# Patient Record
Sex: FEMALE | Race: WHITE | HISPANIC OR LATINO | Employment: FULL TIME | URBAN - METROPOLITAN AREA
[De-identification: names, ages, dates, MRNs, and addresses within clinical notes are randomized per-mention and may not be internally consistent; named-entity substitution may affect disease eponyms.]

---

## 2020-08-04 ENCOUNTER — OFFICE VISIT (OUTPATIENT)
Dept: FAMILY MEDICINE CLINIC | Facility: CLINIC | Age: 45
End: 2020-08-04

## 2020-08-04 VITALS
BODY MASS INDEX: 28.27 KG/M2 | OXYGEN SATURATION: 98 % | HEIGHT: 60 IN | DIASTOLIC BLOOD PRESSURE: 62 MMHG | WEIGHT: 144 LBS | SYSTOLIC BLOOD PRESSURE: 112 MMHG | RESPIRATION RATE: 14 BRPM | TEMPERATURE: 97.3 F | HEART RATE: 86 BPM

## 2020-08-04 DIAGNOSIS — E11.65 TYPE 2 DIABETES MELLITUS WITH HYPERGLYCEMIA, WITH LONG-TERM CURRENT USE OF INSULIN (HCC): ICD-10-CM

## 2020-08-04 DIAGNOSIS — Z12.4 PAP SMEAR FOR CERVICAL CANCER SCREENING: ICD-10-CM

## 2020-08-04 DIAGNOSIS — Z11.59 ENCOUNTER FOR HEPATITIS C SCREENING TEST FOR LOW RISK PATIENT: ICD-10-CM

## 2020-08-04 DIAGNOSIS — Z79.4 TYPE 2 DIABETES MELLITUS WITH HYPERGLYCEMIA, WITH LONG-TERM CURRENT USE OF INSULIN (HCC): ICD-10-CM

## 2020-08-04 DIAGNOSIS — F41.9 ANXIETY: ICD-10-CM

## 2020-08-04 DIAGNOSIS — Z00.00 ENCOUNTER FOR WELL ADULT EXAM WITHOUT ABNORMAL FINDINGS: ICD-10-CM

## 2020-08-04 DIAGNOSIS — Z91.09 ALLERGY TO SUNLIGHT: Primary | ICD-10-CM

## 2020-08-04 DIAGNOSIS — Z79.899 HIGH RISK MEDICATION USE: ICD-10-CM

## 2020-08-04 PROCEDURE — 99203 OFFICE O/P NEW LOW 30 MIN: CPT | Performed by: FAMILY MEDICINE

## 2020-08-04 RX ORDER — BIOTIN 1000 MCG
TABLET,CHEWABLE ORAL
COMMUNITY
End: 2021-08-04

## 2020-08-04 RX ORDER — METFORMIN HYDROCHLORIDE 500 MG/1
500 TABLET, EXTENDED RELEASE ORAL
COMMUNITY
End: 2020-08-04 | Stop reason: SDUPTHER

## 2020-08-04 RX ORDER — METFORMIN HYDROCHLORIDE 500 MG/1
500 TABLET, EXTENDED RELEASE ORAL
Qty: 90 TABLET | Refills: 0 | Status: SHIPPED | OUTPATIENT
Start: 2020-08-04 | End: 2021-01-22

## 2020-08-04 RX ORDER — BLOOD-GLUCOSE METER
1 KIT MISCELLANEOUS AS NEEDED
Qty: 1 EACH | Refills: 0 | Status: SHIPPED | OUTPATIENT
Start: 2020-08-04 | End: 2021-11-17 | Stop reason: SDUPTHER

## 2020-08-04 RX ORDER — ASCORBIC ACID 1000 MG
TABLET, EXTENDED RELEASE ORAL
Status: ON HOLD | COMMUNITY
End: 2021-02-09

## 2020-08-04 RX ORDER — LANCETS 30 GAUGE
1 EACH MISCELLANEOUS 2 TIMES DAILY
Qty: 200 EACH | Refills: 5 | Status: SHIPPED | OUTPATIENT
Start: 2020-08-04 | End: 2021-10-01 | Stop reason: SDUPTHER

## 2020-08-04 NOTE — ASSESSMENT & PLAN NOTE
When she gets her insurance in September he is can ask for names and phone numbers of psychologist in her network and she is going to schedule appointment with 1 of them as soon as possible.
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
## 2020-08-04 NOTE — ASSESSMENT & PLAN NOTE
I wrote a letter on the patient's behalf stating that it is in my medical opinion that she needs to be out of the sun during her workday at work.  Refer to Dr. Jamar Baker.

## 2020-09-04 ENCOUNTER — RESULTS ENCOUNTER (OUTPATIENT)
Dept: FAMILY MEDICINE CLINIC | Facility: CLINIC | Age: 45
End: 2020-09-04

## 2020-09-04 DIAGNOSIS — E11.65 TYPE 2 DIABETES MELLITUS WITH HYPERGLYCEMIA, WITH LONG-TERM CURRENT USE OF INSULIN (HCC): ICD-10-CM

## 2020-09-04 DIAGNOSIS — Z00.00 ENCOUNTER FOR WELL ADULT EXAM WITHOUT ABNORMAL FINDINGS: ICD-10-CM

## 2020-09-04 DIAGNOSIS — Z79.4 TYPE 2 DIABETES MELLITUS WITH HYPERGLYCEMIA, WITH LONG-TERM CURRENT USE OF INSULIN (HCC): ICD-10-CM

## 2020-09-04 DIAGNOSIS — Z79.899 HIGH RISK MEDICATION USE: ICD-10-CM

## 2021-01-12 ENCOUNTER — OFFICE VISIT (OUTPATIENT)
Dept: INTERNAL MEDICINE | Facility: CLINIC | Age: 46
End: 2021-01-12

## 2021-01-12 VITALS
HEART RATE: 95 BPM | HEIGHT: 60 IN | BODY MASS INDEX: 27.84 KG/M2 | RESPIRATION RATE: 16 BRPM | SYSTOLIC BLOOD PRESSURE: 108 MMHG | TEMPERATURE: 97.8 F | WEIGHT: 141.8 LBS | OXYGEN SATURATION: 98 % | DIASTOLIC BLOOD PRESSURE: 64 MMHG

## 2021-01-12 DIAGNOSIS — Z79.4 TYPE 2 DIABETES MELLITUS WITH HYPERGLYCEMIA, WITH LONG-TERM CURRENT USE OF INSULIN (HCC): Primary | ICD-10-CM

## 2021-01-12 DIAGNOSIS — N32.89 BLADDER SPASM: ICD-10-CM

## 2021-01-12 DIAGNOSIS — L30.9 ECZEMA, UNSPECIFIED TYPE: ICD-10-CM

## 2021-01-12 DIAGNOSIS — E11.65 TYPE 2 DIABETES MELLITUS WITH HYPERGLYCEMIA, WITH LONG-TERM CURRENT USE OF INSULIN (HCC): Primary | ICD-10-CM

## 2021-01-12 DIAGNOSIS — L57.8: ICD-10-CM

## 2021-01-12 LAB
BILIRUB BLD-MCNC: NEGATIVE MG/DL
CLARITY, POC: CLEAR
COLOR UR: YELLOW
GLUCOSE UR STRIP-MCNC: ABNORMAL MG/DL
KETONES UR QL: ABNORMAL
LEUKOCYTE EST, POC: NEGATIVE
NITRITE UR-MCNC: NEGATIVE MG/ML
PH UR: 5.5 [PH] (ref 5–8)
PROT UR STRIP-MCNC: NEGATIVE MG/DL
RBC # UR STRIP: NEGATIVE /UL
SP GR UR: 1.01 (ref 1–1.03)
UROBILINOGEN UR QL: NORMAL

## 2021-01-12 PROCEDURE — 81003 URINALYSIS AUTO W/O SCOPE: CPT | Performed by: INTERNAL MEDICINE

## 2021-01-12 PROCEDURE — 99215 OFFICE O/P EST HI 40 MIN: CPT | Performed by: INTERNAL MEDICINE

## 2021-01-12 RX ORDER — HYDROXYZINE HYDROCHLORIDE 25 MG/1
25 TABLET, FILM COATED ORAL EVERY 8 HOURS PRN
Qty: 30 TABLET | Refills: 0 | Status: ON HOLD | OUTPATIENT
Start: 2021-01-12 | End: 2021-02-09

## 2021-01-12 NOTE — PROGRESS NOTES
Yovanny Solitario is a 45 y.o. female, who presents with a chief complaint of   Chief Complaint   Patient presents with   • Eczema   • Diabetes       HPI   Pt here to [facility].      DM - pt has not had labs in over a year.  She is currently on trulicity, jardiance, toujeo, and metformin.  She says she has checked glucoses about 5 times in the past 6 months.  She had diarrhea with trulicity.    Pt worried about her bladder.  She sometimes has cramps as her bladder fills.  It isnt really pain but more of a discomfort.      Pt is very itchy right now.  She occ has hives.  Right now her forearm abd, knees seem to be the worst.  She is scratching all over.  This has been going on for a while.  She was referred to allergy but was waiting on  Her insurance to be active.  She didn't know if she had eczema or something else. She tried kenalog ointment with no help.        The following portions of the patient's history were reviewed and updated as appropriate: allergies, current medications, past family history, past medical history, past social history, past surgical history and problem list.    Allergies: Morphine, Latex, and Penicillins    Review of Systems   Constitutional: Negative.    HENT: Negative.    Eyes: Negative.    Respiratory: Negative.    Cardiovascular: Negative.    Gastrointestinal: Negative.    Endocrine: Negative.    Genitourinary: Negative.    Musculoskeletal: Negative.    Skin: Negative.    Allergic/Immunologic: Negative.    Neurological: Negative.    Hematological: Negative.    Psychiatric/Behavioral: Negative.    All other systems reviewed and are negative.            Wt Readings from Last 3 Encounters:   01/12/21 64.3 kg (141 lb 12.8 oz)   08/04/20 65.3 kg (144 lb)     Temp Readings from Last 3 Encounters:   01/12/21 97.8 °F (36.6 °C) (Temporal)   08/04/20 97.3 °F (36.3 °C) (Temporal)     BP Readings from Last 3 Encounters:   01/12/21 108/64   08/04/20 112/62     Pulse Readings from Last 3  Encounters:   01/12/21 95   08/04/20 86     Body mass index is 27.69 kg/m².  @LASTSAO2(3)@    Physical Exam  Vitals signs and nursing note reviewed.   Constitutional:       General: She is not in acute distress.     Appearance: She is well-developed.   HENT:      Head: Normocephalic and atraumatic.      Right Ear: External ear normal.      Left Ear: External ear normal.      Nose: Nose normal.   Eyes:      Conjunctiva/sclera: Conjunctivae normal.      Pupils: Pupils are equal, round, and reactive to light.   Neck:      Musculoskeletal: Normal range of motion and neck supple.   Cardiovascular:      Rate and Rhythm: Normal rate and regular rhythm.      Heart sounds: Normal heart sounds.   Pulmonary:      Effort: Pulmonary effort is normal. No respiratory distress.      Breath sounds: Normal breath sounds. No wheezing.   Musculoskeletal: Normal range of motion.      Comments: Normal gait   Skin:     General: Skin is warm and dry.   Neurological:      Mental Status: She is alert and oriented to person, place, and time.   Psychiatric:         Behavior: Behavior normal.         Thought Content: Thought content normal.         Judgment: Judgment normal.         No results found for this or any previous visit.        Diagnoses and all orders for this visit:    1. Type 2 diabetes mellitus with hyperglycemia, with long-term current use of insulin (CMS/[facility]) (Primary)  -     Comprehensive Metabolic Panel  -     CBC & Differential  -     T4, Free  -     TSH  -     Lipid Panel With LDL / HDL Ratio  -     Hemoglobin A1c  -     Microalbumin / Creatinine Urine Ratio - Urine, Clean Catch  -     POC Urinalysis Dipstick, Automated    2. Bladder spasm  -     Comprehensive Metabolic Panel  -     POC Urinalysis Dipstick, Automated    3. Eczema, unspecified type  -     hydrOXYzine (ATARAX) 25 MG tablet; Take 1 tablet by mouth Every 8 (Eight) Hours As Needed for Itching or Allergies.  Dispense: 30 tablet; Refill: 0  -     Ambulatory  Referral to Dermatology    4. Dermatitis, chronic solar  -     hydrOXYzine (ATARAX) 25 MG tablet; Take 1 tablet by mouth Every 8 (Eight) Hours As Needed for Itching or Allergies.  Dispense: 30 tablet; Refill: 0  -     Ambulatory Referral to Dermatology      40 minutes was spent in patient care with >50% in counseling about the above issues including medications and disease management plan.         Outpatient Medications Prior to Visit   Medication Sig Dispense Refill   • Ascorbic Acid (VITAMIN C ER) 1000 MG tablet controlled-release Take  by mouth.     • Biotin 1000 MCG chewable tablet Chew.     • Cinnamon 500 MG tablet Take  by mouth.     • Empagliflozin (JARDIANCE PO) Take  by mouth.     • glucose blood test strip Use as instructed 200 each 12   • glucose monitor monitoring kit 1 each As Needed (Diabetes 2). 1 each 0   • Lancets misc 1 each 2 (Two) Times a Day. 200 each 5   • Multiple Vitamins-Minerals (MULTIVITAMIN ADULTS PO) Take  by mouth.     • Dulaglutide (Trulicity) 0.75 MG/0.5ML solution pen-injector Inject 0.75 mg under the skin into the appropriate area as directed 1 (One) Time Per Week. 4 pen 2   • Insulin Glargine, 1 Unit Dial, (Toujeo SoloStar) 300 UNIT/ML solution pen-injector injection Inject 60 Units under the skin into the appropriate area as directed Daily. 3 pen 2   • metFORMIN ER (GLUCOPHAGE-XR) 500 MG 24 hr tablet Take 1 tablet by mouth Daily With Breakfast. 90 tablet 0   • triamcinolone (KENALOG) 0.1 % ointment Apply  topically to the appropriate area as directed 2 (Two) Times a Day As Needed for Irritation or Rash. 80 g 3     No facility-administered medications prior to visit.      New Medications Ordered This Visit   Medications   • hydrOXYzine (ATARAX) 25 MG tablet     Sig: Take 1 tablet by mouth Every 8 (Eight) Hours As Needed for Itching or Allergies.     Dispense:  30 tablet     Refill:  0     [unfilled]  There are no discontinued medications.      Return in about 3 months (around  4/12/2021) for Recheck, labs.

## 2021-01-13 LAB
ALBUMIN SERPL-MCNC: 4.5 G/DL (ref 3.5–5.2)
ALBUMIN/CREAT UR: 8 MG/G CREAT (ref 0–29)
ALBUMIN/GLOB SERPL: 1.9 G/DL
ALP SERPL-CCNC: 119 U/L (ref 39–117)
ALT SERPL-CCNC: 42 U/L (ref 1–33)
AST SERPL-CCNC: 26 U/L (ref 1–32)
BASOPHILS # BLD AUTO: 0.04 10*3/MM3 (ref 0–0.2)
BASOPHILS NFR BLD AUTO: 0.5 % (ref 0–1.5)
BILIRUB SERPL-MCNC: 0.7 MG/DL (ref 0–1.2)
BUN SERPL-MCNC: 7 MG/DL (ref 6–20)
BUN/CREAT SERPL: 12.5 (ref 7–25)
CALCIUM SERPL-MCNC: 9.5 MG/DL (ref 8.6–10.5)
CHLORIDE SERPL-SCNC: 98 MMOL/L (ref 98–107)
CHOLEST SERPL-MCNC: 222 MG/DL (ref 0–200)
CO2 SERPL-SCNC: 28.8 MMOL/L (ref 22–29)
CREAT SERPL-MCNC: 0.56 MG/DL (ref 0.57–1)
CREAT UR-MCNC: 54.4 MG/DL
EOSINOPHIL # BLD AUTO: 0.21 10*3/MM3 (ref 0–0.4)
EOSINOPHIL NFR BLD AUTO: 2.7 % (ref 0.3–6.2)
ERYTHROCYTE [DISTWIDTH] IN BLOOD BY AUTOMATED COUNT: 13 % (ref 12.3–15.4)
GLOBULIN SER CALC-MCNC: 2.4 GM/DL
GLUCOSE SERPL-MCNC: 377 MG/DL (ref 65–99)
HBA1C MFR BLD: 11.2 % (ref 4.8–5.6)
HCT VFR BLD AUTO: 44.3 % (ref 34–46.6)
HDLC SERPL-MCNC: 46 MG/DL (ref 40–60)
HGB BLD-MCNC: 15.2 G/DL (ref 12–15.9)
IMM GRANULOCYTES # BLD AUTO: 0.03 10*3/MM3 (ref 0–0.05)
IMM GRANULOCYTES NFR BLD AUTO: 0.4 % (ref 0–0.5)
LDLC SERPL CALC-MCNC: 124 MG/DL (ref 0–100)
LDLC/HDLC SERPL: 2.54 {RATIO}
LYMPHOCYTES # BLD AUTO: 1.7 10*3/MM3 (ref 0.7–3.1)
LYMPHOCYTES NFR BLD AUTO: 22.2 % (ref 19.6–45.3)
MCH RBC QN AUTO: 30.5 PG (ref 26.6–33)
MCHC RBC AUTO-ENTMCNC: 34.3 G/DL (ref 31.5–35.7)
MCV RBC AUTO: 88.8 FL (ref 79–97)
MICROALBUMIN UR-MCNC: 4.3 UG/ML
MONOCYTES # BLD AUTO: 0.44 10*3/MM3 (ref 0.1–0.9)
MONOCYTES NFR BLD AUTO: 5.8 % (ref 5–12)
NEUTROPHILS # BLD AUTO: 5.23 10*3/MM3 (ref 1.7–7)
NEUTROPHILS NFR BLD AUTO: 68.4 % (ref 42.7–76)
NRBC BLD AUTO-RTO: 0 /100 WBC (ref 0–0.2)
PLATELET # BLD AUTO: 282 10*3/MM3 (ref 140–450)
POTASSIUM SERPL-SCNC: 4.4 MMOL/L (ref 3.5–5.2)
PROT SERPL-MCNC: 6.9 G/DL (ref 6–8.5)
RBC # BLD AUTO: 4.99 10*6/MM3 (ref 3.77–5.28)
SODIUM SERPL-SCNC: 138 MMOL/L (ref 136–145)
T4 FREE SERPL-MCNC: 1.38 NG/DL (ref 0.93–1.7)
TRIGL SERPL-MCNC: 295 MG/DL (ref 0–150)
TSH SERPL DL<=0.005 MIU/L-ACNC: 1.05 UIU/ML (ref 0.27–4.2)
VLDLC SERPL CALC-MCNC: 52 MG/DL (ref 5–40)
WBC # BLD AUTO: 7.65 10*3/MM3 (ref 3.4–10.8)

## 2021-01-22 ENCOUNTER — OFFICE VISIT (OUTPATIENT)
Dept: INTERNAL MEDICINE | Facility: CLINIC | Age: 46
End: 2021-01-22

## 2021-01-22 VITALS
HEART RATE: 99 BPM | BODY MASS INDEX: 28.27 KG/M2 | DIASTOLIC BLOOD PRESSURE: 78 MMHG | TEMPERATURE: 97.1 F | HEIGHT: 60 IN | WEIGHT: 144 LBS | OXYGEN SATURATION: 99 % | RESPIRATION RATE: 16 BRPM | SYSTOLIC BLOOD PRESSURE: 110 MMHG

## 2021-01-22 DIAGNOSIS — Z79.4 TYPE 2 DIABETES MELLITUS WITH HYPERGLYCEMIA, WITH LONG-TERM CURRENT USE OF INSULIN (HCC): ICD-10-CM

## 2021-01-22 DIAGNOSIS — E78.2 MIXED HYPERLIPIDEMIA: ICD-10-CM

## 2021-01-22 DIAGNOSIS — E11.65 TYPE 2 DIABETES MELLITUS WITH HYPERGLYCEMIA, WITH LONG-TERM CURRENT USE OF INSULIN (HCC): ICD-10-CM

## 2021-01-22 DIAGNOSIS — E11.65 UNCONTROLLED TYPE 2 DIABETES MELLITUS WITH HYPERGLYCEMIA (HCC): Primary | ICD-10-CM

## 2021-01-22 PROCEDURE — 99214 OFFICE O/P EST MOD 30 MIN: CPT | Performed by: INTERNAL MEDICINE

## 2021-01-22 RX ORDER — EMPAGLIFLOZIN AND METFORMIN HYDROCHLORIDE 12.5; 1 MG/1; MG/1
2 TABLET ORAL DAILY
Qty: 60 TABLET | Refills: 0 | Status: SHIPPED | OUTPATIENT
Start: 2021-01-22 | End: 2021-02-11 | Stop reason: HOSPADM

## 2021-01-22 RX ORDER — DULAGLUTIDE 1.5 MG/.5ML
1.5 INJECTION, SOLUTION SUBCUTANEOUS
Qty: 4 PEN | Refills: 0 | Status: SHIPPED | OUTPATIENT
Start: 2021-01-22 | End: 2021-02-11 | Stop reason: HOSPADM

## 2021-01-22 NOTE — PROGRESS NOTES
"Chief Complaint  Diabetes and Hyperlipidemia    Subjective          Yovanny Solitario presents to [facility] for   History of Present Illness  Pt here to review and f/u on labs.     Uncontrolled T2DM - a1c 11.2.  Pt was on humalog 12 units tid , toujeo/tresiba 60 units daily, and trulicity.  She has also been on metformin and jardiance.  She has been out of her medications x 6 months.  Long term she would like to be off insulin.      HLD - cholesterol up.  Pt asking if she needs statin therapy.    Objective   Vital Signs:   /78 (BP Location: Left arm, Patient Position: Sitting, Cuff Size: Adult)   Pulse 99   Temp 97.1 °F (36.2 °C) (Temporal)   Resp 16   Ht 152.4 cm (60\")   Wt 65.3 kg (144 lb)   SpO2 99%   BMI 28.12 kg/m²     Physical Exam  Vitals signs and nursing note reviewed.   Constitutional:       General: She is not in acute distress.     Appearance: She is well-developed.   HENT:      Head: Normocephalic and atraumatic.      Right Ear: External ear normal.      Left Ear: External ear normal.      Nose: Nose normal.   Eyes:      Conjunctiva/sclera: Conjunctivae normal.      Pupils: Pupils are equal, round, and reactive to light.   Neck:      Musculoskeletal: Normal range of motion and neck supple.   Cardiovascular:      Rate and Rhythm: Normal rate and regular rhythm.      Heart sounds: Normal heart sounds.   Pulmonary:      Effort: Pulmonary effort is normal. No respiratory distress.      Breath sounds: Normal breath sounds. No wheezing.   Musculoskeletal: Normal range of motion.      Comments: Normal gait   Skin:     General: Skin is warm and dry.   Neurological:      Mental Status: She is alert and oriented to person, place, and time.   Psychiatric:         Behavior: Behavior normal.         Thought Content: Thought content normal.         Judgment: Judgment normal.        Result Review :   The following data was reviewed by: Tatyana Delgado MD on " 01/22/2021:  Common labs    Common Labsle 1/12/21 1/12/21 1/12/21 1/12/21 1/12/21    1025 1025 1025 1025 1025   Glucose 377 (A)       BUN 7       Creatinine 0.56 (A)       eGFR Non  Am 118       eGFR African Am 143       Sodium 138       Potassium 4.4       Chloride 98       Calcium 9.5       Total Protein 6.9       Albumin 4.50       Total Bilirubin 0.7       Alkaline Phosphatase 119 (A)       AST (SGOT) 26       ALT (SGPT) 42 (A)       WBC  7.65      Hemoglobin  15.2      Hematocrit  44.3      Platelets  282      Total Cholesterol   222 (A)     Triglycerides   295 (A)     HDL Cholesterol   46     LDL Cholesterol    124 (A)     Hemoglobin A1C    11.20 (A)    Microalbumin, Urine     4.3   (A) Abnormal value       Comments are available for some flowsheets but are not being displayed.                     Assessment and Plan    Problem List Items Addressed This Visit     None      Visit Diagnoses     Uncontrolled type 2 diabetes mellitus with hyperglycemia (CMS/[facility])    -  Primary    Relevant Medications    Empagliflozin-metFORMIN HCl (Synjardy) 12.5-1000 MG tablet    Dulaglutide (Trulicity) 1.5 MG/0.5ML solution pen-injector    Type 2 diabetes mellitus with hyperglycemia, with long-term current use of insulin (CMS/[facility])        Relevant Medications    Empagliflozin-metFORMIN HCl (Synjardy) 12.5-1000 MG tablet    Dulaglutide (Trulicity) 1.5 MG/0.5ML solution pen-injector    Mixed hyperlipidemia          pt needs treatment of dm and hld both.  Since she has been off all meds I will begin restarting meds in a stepwise fashion to help pt tolerate regimen better.  Begin synjardy and trulicity.  She does need a statin is cholesterol remains high but improving glucose control should improve her lipid profile.  Will re-evaluate in 4 weeks.      I spent 30 minutes caring for Yovanny on this date of service. This time includes time spent by me in the following activities:preparing for the visit, reviewing tests, performing  a medically appropriate examination and/or evaluation , counseling and educating the patient/family/caregiver, ordering medications, tests, or procedures and documenting information in the medical record  Follow Up   Return in about 4 weeks (around 2/19/2021) for Recheck.  Patient was given instructions and counseling regarding her condition or for health maintenance advice. Please see specific information pulled into the AVS if appropriate.

## 2021-02-09 ENCOUNTER — HOSPITAL ENCOUNTER (INPATIENT)
Facility: HOSPITAL | Age: 46
LOS: 2 days | Discharge: HOME | End: 2021-02-11
Attending: EMERGENCY MEDICINE | Admitting: HOSPITALIST

## 2021-02-09 ENCOUNTER — APPOINTMENT (OUTPATIENT)
Dept: GENERAL RADIOLOGY | Facility: HOSPITAL | Age: 46
End: 2021-02-09

## 2021-02-09 DIAGNOSIS — R65.10 SIRS (SYSTEMIC INFLAMMATORY RESPONSE SYNDROME) (HCC): ICD-10-CM

## 2021-02-09 DIAGNOSIS — E13.10 DIABETIC KETOACIDOSIS WITHOUT COMA ASSOCIATED WITH OTHER SPECIFIED DIABETES MELLITUS (HCC): Primary | ICD-10-CM

## 2021-02-09 PROBLEM — E11.10 DIABETIC ACIDOSIS WITHOUT COMA: Status: ACTIVE | Noted: 2021-02-09

## 2021-02-09 LAB
ALBUMIN SERPL-MCNC: 5.5 G/DL (ref 3.5–5.2)
ALBUMIN/GLOB SERPL: 1.4 G/DL
ALP SERPL-CCNC: 160 U/L (ref 39–117)
ALT SERPL W P-5'-P-CCNC: 38 U/L (ref 1–33)
AMPHET+METHAMPHET UR QL: NEGATIVE
AMPHETAMINES UR QL: NEGATIVE
ANION GAP SERPL CALCULATED.3IONS-SCNC: 12.4 MMOL/L (ref 5–15)
ANION GAP SERPL CALCULATED.3IONS-SCNC: 17.1 MMOL/L (ref 5–15)
ANION GAP SERPL CALCULATED.3IONS-SCNC: 30.6 MMOL/L (ref 5–15)
AST SERPL-CCNC: 17 U/L (ref 1–32)
ATMOSPHERIC PRESS: 743 MMHG
BARBITURATES UR QL SCN: NEGATIVE
BASE EXCESS BLDV CALC-SCNC: -20.2 MMOL/L (ref 0–2)
BASOPHILS # BLD AUTO: 0.03 10*3/MM3 (ref 0–0.2)
BASOPHILS NFR BLD AUTO: 0.2 % (ref 0–1.5)
BDY SITE: ABNORMAL
BENZODIAZ UR QL SCN: NEGATIVE
BILIRUB SERPL-MCNC: 1.2 MG/DL (ref 0–1.2)
BILIRUB UR QL STRIP: NEGATIVE
BODY TEMPERATURE: 37 C
BUN SERPL-MCNC: 10 MG/DL (ref 6–20)
BUN SERPL-MCNC: 14 MG/DL (ref 6–20)
BUN SERPL-MCNC: 18 MG/DL (ref 6–20)
BUN/CREAT SERPL: 12.3 (ref 7–25)
BUN/CREAT SERPL: 17 (ref 7–25)
BUN/CREAT SERPL: 17.1 (ref 7–25)
BUPRENORPHINE SERPL-MCNC: NEGATIVE NG/ML
CALCIUM SPEC-SCNC: 7.7 MG/DL (ref 8.6–10.5)
CALCIUM SPEC-SCNC: 7.8 MG/DL (ref 8.6–10.5)
CALCIUM SPEC-SCNC: 9.9 MG/DL (ref 8.6–10.5)
CANNABINOIDS SERPL QL: NEGATIVE
CHLORIDE SERPL-SCNC: 109 MMOL/L (ref 98–107)
CHLORIDE SERPL-SCNC: 110 MMOL/L (ref 98–107)
CHLORIDE SERPL-SCNC: 96 MMOL/L (ref 98–107)
CLARITY UR: CLEAR
CO2 SERPL-SCNC: 11.4 MMOL/L (ref 22–29)
CO2 SERPL-SCNC: 11.9 MMOL/L (ref 22–29)
CO2 SERPL-SCNC: 15.6 MMOL/L (ref 22–29)
COCAINE UR QL: NEGATIVE
COLOR UR: YELLOW
CREAT SERPL-MCNC: 0.81 MG/DL (ref 0.57–1)
CREAT SERPL-MCNC: 0.82 MG/DL (ref 0.57–1)
CREAT SERPL-MCNC: 1.06 MG/DL (ref 0.57–1)
D-LACTATE SERPL-SCNC: 2.1 MMOL/L (ref 0.5–2)
D-LACTATE SERPL-SCNC: 3.3 MMOL/L (ref 0.5–2)
DEPRECATED RDW RBC AUTO: 40.3 FL (ref 37–54)
EOSINOPHIL # BLD AUTO: 0.01 10*3/MM3 (ref 0–0.4)
EOSINOPHIL NFR BLD AUTO: 0.1 % (ref 0.3–6.2)
ERYTHROCYTE [DISTWIDTH] IN BLOOD BY AUTOMATED COUNT: 12.6 % (ref 12.3–15.4)
GFR SERPL CREATININE-BSD FRML MDRD: 57 ML/MIN/1.73
GFR SERPL CREATININE-BSD FRML MDRD: 69 ML/MIN/1.73
GFR SERPL CREATININE-BSD FRML MDRD: 76 ML/MIN/1.73
GFR SERPL CREATININE-BSD FRML MDRD: 77 ML/MIN/1.73
GFR SERPL CREATININE-BSD FRML MDRD: 92 ML/MIN/1.73
GFR SERPL CREATININE-BSD FRML MDRD: 94 ML/MIN/1.73
GLOBULIN UR ELPH-MCNC: 3.8 GM/DL
GLUCOSE BLDC GLUCOMTR-MCNC: 124 MG/DL (ref 70–130)
GLUCOSE BLDC GLUCOMTR-MCNC: 132 MG/DL (ref 70–130)
GLUCOSE BLDC GLUCOMTR-MCNC: 133 MG/DL (ref 70–130)
GLUCOSE BLDC GLUCOMTR-MCNC: 145 MG/DL (ref 70–130)
GLUCOSE BLDC GLUCOMTR-MCNC: 150 MG/DL (ref 70–130)
GLUCOSE BLDC GLUCOMTR-MCNC: 155 MG/DL (ref 70–130)
GLUCOSE BLDC GLUCOMTR-MCNC: 162 MG/DL (ref 70–130)
GLUCOSE BLDC GLUCOMTR-MCNC: 167 MG/DL (ref 70–130)
GLUCOSE BLDC GLUCOMTR-MCNC: 169 MG/DL (ref 70–130)
GLUCOSE BLDC GLUCOMTR-MCNC: 177 MG/DL (ref 70–130)
GLUCOSE BLDC GLUCOMTR-MCNC: 252 MG/DL (ref 70–130)
GLUCOSE BLDC GLUCOMTR-MCNC: 294 MG/DL (ref 70–130)
GLUCOSE SERPL-MCNC: 155 MG/DL (ref 65–99)
GLUCOSE SERPL-MCNC: 195 MG/DL (ref 65–99)
GLUCOSE SERPL-MCNC: 326 MG/DL (ref 65–99)
GLUCOSE UR STRIP-MCNC: ABNORMAL MG/DL
HBA1C MFR BLD: 10.6 % (ref 4.8–5.6)
HCG SERPL QL: NEGATIVE
HCO3 BLDV-SCNC: 10.6 MMOL/L (ref 22–28)
HCT VFR BLD AUTO: 53.2 % (ref 34–46.6)
HGB BLD-MCNC: 18.5 G/DL (ref 12–15.9)
HGB BLDA-MCNC: 17.5 G/DL (ref 13.5–17.5)
HGB UR QL STRIP.AUTO: NEGATIVE
IMM GRANULOCYTES # BLD AUTO: 0.14 10*3/MM3 (ref 0–0.05)
IMM GRANULOCYTES NFR BLD AUTO: 0.8 % (ref 0–0.5)
KETONES UR QL STRIP: ABNORMAL
LACTATE HOLD SPECIMEN: NORMAL
LEUKOCYTE ESTERASE UR QL STRIP.AUTO: NEGATIVE
LYMPHOCYTES # BLD AUTO: 1.45 10*3/MM3 (ref 0.7–3.1)
LYMPHOCYTES NFR BLD AUTO: 8 % (ref 19.6–45.3)
Lab: ABNORMAL
MAGNESIUM SERPL-MCNC: 1.8 MG/DL (ref 1.6–2.6)
MAGNESIUM SERPL-MCNC: 2 MG/DL (ref 1.6–2.6)
MAGNESIUM SERPL-MCNC: 2.4 MG/DL (ref 1.6–2.6)
MCH RBC QN AUTO: 30.6 PG (ref 26.6–33)
MCHC RBC AUTO-ENTMCNC: 34.8 G/DL (ref 31.5–35.7)
MCV RBC AUTO: 87.9 FL (ref 79–97)
METHADONE UR QL SCN: NEGATIVE
MODALITY: ABNORMAL
MONOCYTES # BLD AUTO: 0.31 10*3/MM3 (ref 0.1–0.9)
MONOCYTES NFR BLD AUTO: 1.7 % (ref 5–12)
NEUTROPHILS NFR BLD AUTO: 16.12 10*3/MM3 (ref 1.7–7)
NEUTROPHILS NFR BLD AUTO: 89.2 % (ref 42.7–76)
NITRITE UR QL STRIP: NEGATIVE
NOTIFIED BY: ABNORMAL
NOTIFIED WHO: ABNORMAL
OPIATES UR QL: NEGATIVE
OXYCODONE UR QL SCN: NEGATIVE
PCO2 BLDV: 41.7 MM HG (ref 41–51)
PCP UR QL SCN: NEGATIVE
PH BLDV: 7.01 PH UNITS (ref 7.32–7.42)
PH UR STRIP.AUTO: <=5 [PH] (ref 4.5–8)
PHOSPHATE SERPL-MCNC: 1.3 MG/DL (ref 2.5–4.5)
PHOSPHATE SERPL-MCNC: 2.8 MG/DL (ref 2.5–4.5)
PHOSPHATE SERPL-MCNC: 7 MG/DL (ref 2.5–4.5)
PLATELET # BLD AUTO: 330 10*3/MM3 (ref 140–450)
PMV BLD AUTO: 11.7 FL (ref 6–12)
PO2 BLDV: 35 MM HG (ref 27–53)
POTASSIUM SERPL-SCNC: 3.9 MMOL/L (ref 3.5–5.2)
POTASSIUM SERPL-SCNC: 4 MMOL/L (ref 3.5–5.2)
POTASSIUM SERPL-SCNC: 4.5 MMOL/L (ref 3.5–5.2)
PROCALCITONIN SERPL-MCNC: 0.09 NG/ML (ref 0–0.25)
PROPOXYPH UR QL: NEGATIVE
PROT SERPL-MCNC: 9.3 G/DL (ref 6–8.5)
PROT UR QL STRIP: ABNORMAL
QT INTERVAL: 324 MS
RBC # BLD AUTO: 6.05 10*6/MM3 (ref 3.77–5.28)
SAO2 % BLDCOV: 54.1 % (ref 45–75)
SARS-COV-2 RNA PNL SPEC NAA+PROBE: NOT DETECTED
SODIUM SERPL-SCNC: 137 MMOL/L (ref 136–145)
SODIUM SERPL-SCNC: 138 MMOL/L (ref 136–145)
SODIUM SERPL-SCNC: 139 MMOL/L (ref 136–145)
SP GR UR STRIP: 1.02 (ref 1–1.03)
TRICYCLICS UR QL SCN: NEGATIVE
TROPONIN T SERPL-MCNC: <0.01 NG/ML (ref 0–0.03)
TROPONIN T SERPL-MCNC: <0.01 NG/ML (ref 0–0.03)
TSH SERPL DL<=0.05 MIU/L-ACNC: 0.52 UIU/ML (ref 0.27–4.2)
UROBILINOGEN UR QL STRIP: ABNORMAL
VENTILATOR MODE: ABNORMAL
WBC # BLD AUTO: 18.06 10*3/MM3 (ref 3.4–10.8)

## 2021-02-09 PROCEDURE — 80048 BASIC METABOLIC PNL TOTAL CA: CPT | Performed by: HOSPITALIST

## 2021-02-09 PROCEDURE — 85025 COMPLETE CBC W/AUTO DIFF WBC: CPT | Performed by: EMERGENCY MEDICINE

## 2021-02-09 PROCEDURE — 84100 ASSAY OF PHOSPHORUS: CPT | Performed by: HOSPITALIST

## 2021-02-09 PROCEDURE — 82962 GLUCOSE BLOOD TEST: CPT

## 2021-02-09 PROCEDURE — 99291 CRITICAL CARE FIRST HOUR: CPT | Performed by: EMERGENCY MEDICINE

## 2021-02-09 PROCEDURE — 25010000003 MAGNESIUM SULFATE 4 GM/100ML SOLUTION: Performed by: HOSPITALIST

## 2021-02-09 PROCEDURE — 25010000002 ONDANSETRON PER 1 MG: Performed by: EMERGENCY MEDICINE

## 2021-02-09 PROCEDURE — 80053 COMPREHEN METABOLIC PANEL: CPT | Performed by: EMERGENCY MEDICINE

## 2021-02-09 PROCEDURE — 36415 COLL VENOUS BLD VENIPUNCTURE: CPT

## 2021-02-09 PROCEDURE — 63710000001 INSULIN REGULAR HUMAN PER 5 UNITS: Performed by: EMERGENCY MEDICINE

## 2021-02-09 PROCEDURE — 93005 ELECTROCARDIOGRAM TRACING: CPT | Performed by: EMERGENCY MEDICINE

## 2021-02-09 PROCEDURE — 82803 BLOOD GASES ANY COMBINATION: CPT

## 2021-02-09 PROCEDURE — 87635 SARS-COV-2 COVID-19 AMP PRB: CPT | Performed by: EMERGENCY MEDICINE

## 2021-02-09 PROCEDURE — 81003 URINALYSIS AUTO W/O SCOPE: CPT | Performed by: EMERGENCY MEDICINE

## 2021-02-09 PROCEDURE — 80306 DRUG TEST PRSMV INSTRMNT: CPT | Performed by: HOSPITALIST

## 2021-02-09 PROCEDURE — 84443 ASSAY THYROID STIM HORMONE: CPT | Performed by: HOSPITALIST

## 2021-02-09 PROCEDURE — 99284 EMERGENCY DEPT VISIT MOD MDM: CPT

## 2021-02-09 PROCEDURE — 84703 CHORIONIC GONADOTROPIN ASSAY: CPT | Performed by: EMERGENCY MEDICINE

## 2021-02-09 PROCEDURE — 83036 HEMOGLOBIN GLYCOSYLATED A1C: CPT | Performed by: HOSPITALIST

## 2021-02-09 PROCEDURE — G0378 HOSPITAL OBSERVATION PER HR: HCPCS

## 2021-02-09 PROCEDURE — 25010000002 METOCLOPRAMIDE PER 10 MG: Performed by: EMERGENCY MEDICINE

## 2021-02-09 PROCEDURE — 99222 1ST HOSP IP/OBS MODERATE 55: CPT | Performed by: HOSPITALIST

## 2021-02-09 PROCEDURE — 25010000002 ONDANSETRON PER 1 MG: Performed by: NURSE PRACTITIONER

## 2021-02-09 PROCEDURE — 25010000003 POTASSIUM CHLORIDE 10 MEQ/100ML SOLUTION: Performed by: EMERGENCY MEDICINE

## 2021-02-09 PROCEDURE — 87040 BLOOD CULTURE FOR BACTERIA: CPT | Performed by: EMERGENCY MEDICINE

## 2021-02-09 PROCEDURE — 83605 ASSAY OF LACTIC ACID: CPT | Performed by: EMERGENCY MEDICINE

## 2021-02-09 PROCEDURE — 93010 ELECTROCARDIOGRAM REPORT: CPT | Performed by: INTERNAL MEDICINE

## 2021-02-09 PROCEDURE — 84145 PROCALCITONIN (PCT): CPT | Performed by: EMERGENCY MEDICINE

## 2021-02-09 PROCEDURE — 84484 ASSAY OF TROPONIN QUANT: CPT | Performed by: HOSPITALIST

## 2021-02-09 PROCEDURE — 83735 ASSAY OF MAGNESIUM: CPT | Performed by: HOSPITALIST

## 2021-02-09 PROCEDURE — 25010000002 ENOXAPARIN PER 10 MG: Performed by: HOSPITALIST

## 2021-02-09 PROCEDURE — 71045 X-RAY EXAM CHEST 1 VIEW: CPT

## 2021-02-09 RX ORDER — ONDANSETRON 2 MG/ML
8 INJECTION INTRAMUSCULAR; INTRAVENOUS ONCE
Status: COMPLETED | OUTPATIENT
Start: 2021-02-09 | End: 2021-02-09

## 2021-02-09 RX ORDER — SODIUM CHLORIDE 9 MG/ML
40 INJECTION, SOLUTION INTRAVENOUS AS NEEDED
Status: DISCONTINUED | OUTPATIENT
Start: 2021-02-09 | End: 2021-02-11 | Stop reason: HOSPADM

## 2021-02-09 RX ORDER — DEXTROSE, SODIUM CHLORIDE, AND POTASSIUM CHLORIDE 5; .45; .15 G/100ML; G/100ML; G/100ML
150 INJECTION INTRAVENOUS CONTINUOUS PRN
Status: DISCONTINUED | OUTPATIENT
Start: 2021-02-09 | End: 2021-02-10

## 2021-02-09 RX ORDER — POTASSIUM CHLORIDE 7.45 MG/ML
10 INJECTION INTRAVENOUS ONCE
Status: DISCONTINUED | OUTPATIENT
Start: 2021-02-09 | End: 2021-02-11 | Stop reason: HOSPADM

## 2021-02-09 RX ORDER — DEXTROSE, SODIUM CHLORIDE, AND POTASSIUM CHLORIDE 5; .9; .15 G/100ML; G/100ML; G/100ML
150 INJECTION INTRAVENOUS CONTINUOUS PRN
Status: DISCONTINUED | OUTPATIENT
Start: 2021-02-09 | End: 2021-02-10

## 2021-02-09 RX ORDER — DEXTROSE AND SODIUM CHLORIDE 5; .45 G/100ML; G/100ML
150 INJECTION, SOLUTION INTRAVENOUS CONTINUOUS PRN
Status: DISCONTINUED | OUTPATIENT
Start: 2021-02-09 | End: 2021-02-10

## 2021-02-09 RX ORDER — SODIUM CHLORIDE 0.9 % (FLUSH) 0.9 %
10 SYRINGE (ML) INJECTION AS NEEDED
Status: DISCONTINUED | OUTPATIENT
Start: 2021-02-09 | End: 2021-02-11 | Stop reason: HOSPADM

## 2021-02-09 RX ORDER — SODIUM CHLORIDE AND POTASSIUM CHLORIDE 150; 450 MG/100ML; MG/100ML
250 INJECTION, SOLUTION INTRAVENOUS CONTINUOUS PRN
Status: DISCONTINUED | OUTPATIENT
Start: 2021-02-09 | End: 2021-02-10

## 2021-02-09 RX ORDER — DEXTROSE MONOHYDRATE 25 G/50ML
25-50 INJECTION, SOLUTION INTRAVENOUS
Status: DISCONTINUED | OUTPATIENT
Start: 2021-02-09 | End: 2021-02-09

## 2021-02-09 RX ORDER — MAGNESIUM SULFATE HEPTAHYDRATE 40 MG/ML
2 INJECTION, SOLUTION INTRAVENOUS AS NEEDED
Status: DISCONTINUED | OUTPATIENT
Start: 2021-02-09 | End: 2021-02-11 | Stop reason: HOSPADM

## 2021-02-09 RX ORDER — DEXTROSE MONOHYDRATE 25 G/50ML
12.5 INJECTION, SOLUTION INTRAVENOUS AS NEEDED
Status: DISCONTINUED | OUTPATIENT
Start: 2021-02-09 | End: 2021-02-11 | Stop reason: HOSPADM

## 2021-02-09 RX ORDER — DEXTROSE, SODIUM CHLORIDE, AND POTASSIUM CHLORIDE 5; .45; .3 G/100ML; G/100ML; G/100ML
150 INJECTION INTRAVENOUS CONTINUOUS PRN
Status: DISCONTINUED | OUTPATIENT
Start: 2021-02-09 | End: 2021-02-10

## 2021-02-09 RX ORDER — SODIUM CHLORIDE 9 MG/ML
INJECTION, SOLUTION INTRAVENOUS
Status: DISPENSED
Start: 2021-02-09 | End: 2021-02-09

## 2021-02-09 RX ORDER — SODIUM CHLORIDE 0.9 % (FLUSH) 0.9 %
3-10 SYRINGE (ML) INJECTION AS NEEDED
Status: DISCONTINUED | OUTPATIENT
Start: 2021-02-09 | End: 2021-02-11 | Stop reason: HOSPADM

## 2021-02-09 RX ORDER — MAGNESIUM SULFATE HEPTAHYDRATE 40 MG/ML
4 INJECTION, SOLUTION INTRAVENOUS AS NEEDED
Status: DISCONTINUED | OUTPATIENT
Start: 2021-02-09 | End: 2021-02-11 | Stop reason: HOSPADM

## 2021-02-09 RX ORDER — SODIUM CHLORIDE 0.9 % (FLUSH) 0.9 %
10 SYRINGE (ML) INJECTION EVERY 12 HOURS SCHEDULED
Status: DISCONTINUED | OUTPATIENT
Start: 2021-02-09 | End: 2021-02-11 | Stop reason: HOSPADM

## 2021-02-09 RX ORDER — SODIUM CHLORIDE 450 MG/100ML
250 INJECTION, SOLUTION INTRAVENOUS CONTINUOUS PRN
Status: DISCONTINUED | OUTPATIENT
Start: 2021-02-09 | End: 2021-02-10

## 2021-02-09 RX ORDER — SODIUM CHLORIDE AND POTASSIUM CHLORIDE 150; 900 MG/100ML; MG/100ML
250 INJECTION, SOLUTION INTRAVENOUS CONTINUOUS PRN
Status: DISCONTINUED | OUTPATIENT
Start: 2021-02-09 | End: 2021-02-10

## 2021-02-09 RX ORDER — POTASSIUM CHLORIDE 7.45 MG/ML
10 INJECTION INTRAVENOUS ONCE
Status: COMPLETED | OUTPATIENT
Start: 2021-02-09 | End: 2021-02-09

## 2021-02-09 RX ORDER — ONDANSETRON 2 MG/ML
4 INJECTION INTRAMUSCULAR; INTRAVENOUS EVERY 6 HOURS PRN
Status: DISCONTINUED | OUTPATIENT
Start: 2021-02-09 | End: 2021-02-09

## 2021-02-09 RX ORDER — ONDANSETRON 2 MG/ML
4 INJECTION INTRAMUSCULAR; INTRAVENOUS EVERY 4 HOURS PRN
Status: DISCONTINUED | OUTPATIENT
Start: 2021-02-10 | End: 2021-02-11 | Stop reason: HOSPADM

## 2021-02-09 RX ORDER — SODIUM CHLORIDE 9 MG/ML
10 INJECTION, SOLUTION INTRAVENOUS CONTINUOUS PRN
Status: DISCONTINUED | OUTPATIENT
Start: 2021-02-09 | End: 2021-02-11 | Stop reason: HOSPADM

## 2021-02-09 RX ORDER — METOCLOPRAMIDE HYDROCHLORIDE 5 MG/ML
10 INJECTION INTRAMUSCULAR; INTRAVENOUS ONCE
Status: COMPLETED | OUTPATIENT
Start: 2021-02-09 | End: 2021-02-09

## 2021-02-09 RX ORDER — SODIUM CHLORIDE 9 MG/ML
250 INJECTION, SOLUTION INTRAVENOUS CONTINUOUS PRN
Status: DISCONTINUED | OUTPATIENT
Start: 2021-02-09 | End: 2021-02-11 | Stop reason: HOSPADM

## 2021-02-09 RX ORDER — DEXTROSE AND SODIUM CHLORIDE 5; .9 G/100ML; G/100ML
150 INJECTION, SOLUTION INTRAVENOUS CONTINUOUS PRN
Status: DISCONTINUED | OUTPATIENT
Start: 2021-02-09 | End: 2021-02-10

## 2021-02-09 RX ORDER — POTASSIUM CHLORIDE, DEXTROSE MONOHYDRATE AND SODIUM CHLORIDE 300; 5; 900 MG/100ML; G/100ML; MG/100ML
150 INJECTION, SOLUTION INTRAVENOUS CONTINUOUS PRN
Status: DISCONTINUED | OUTPATIENT
Start: 2021-02-09 | End: 2021-02-10

## 2021-02-09 RX ORDER — SODIUM CHLORIDE 0.9 % (FLUSH) 0.9 %
10 SYRINGE (ML) INJECTION ONCE AS NEEDED
Status: DISCONTINUED | OUTPATIENT
Start: 2021-02-09 | End: 2021-02-11 | Stop reason: HOSPADM

## 2021-02-09 RX ORDER — MELATONIN
2000 DAILY
COMMUNITY

## 2021-02-09 RX ORDER — SODIUM CHLORIDE AND POTASSIUM CHLORIDE 300; 900 MG/100ML; MG/100ML
250 INJECTION, SOLUTION INTRAVENOUS CONTINUOUS PRN
Status: DISCONTINUED | OUTPATIENT
Start: 2021-02-09 | End: 2021-02-10

## 2021-02-09 RX ORDER — ACETAMINOPHEN 325 MG/1
650 TABLET ORAL EVERY 6 HOURS PRN
Status: DISCONTINUED | OUTPATIENT
Start: 2021-02-09 | End: 2021-02-10

## 2021-02-09 RX ORDER — SODIUM CHLORIDE 0.9 % (FLUSH) 0.9 %
3 SYRINGE (ML) INJECTION EVERY 12 HOURS SCHEDULED
Status: DISCONTINUED | OUTPATIENT
Start: 2021-02-09 | End: 2021-02-11 | Stop reason: HOSPADM

## 2021-02-09 RX ADMIN — MAGNESIUM SULFATE HEPTAHYDRATE 4 G: 40 INJECTION, SOLUTION INTRAVENOUS at 19:56

## 2021-02-09 RX ADMIN — DEXTROSE MONOHYDRATE, SODIUM CHLORIDE, AND POTASSIUM CHLORIDE 150 ML/HR: 50; 4.5; 1.49 INJECTION, SOLUTION INTRAVENOUS at 12:57

## 2021-02-09 RX ADMIN — SODIUM CHLORIDE 500 ML: 9 INJECTION, SOLUTION INTRAVENOUS at 07:47

## 2021-02-09 RX ADMIN — POTASSIUM PHOSPHATE, MONOBASIC AND POTASSIUM PHOSPHATE, DIBASIC 30 MMOL: 224; 236 INJECTION, SOLUTION INTRAVENOUS at 19:56

## 2021-02-09 RX ADMIN — ONDANSETRON HYDROCHLORIDE 4 MG: 2 INJECTION, SOLUTION INTRAMUSCULAR; INTRAVENOUS at 19:45

## 2021-02-09 RX ADMIN — METOCLOPRAMIDE 10 MG: 5 INJECTION, SOLUTION INTRAMUSCULAR; INTRAVENOUS at 07:45

## 2021-02-09 RX ADMIN — DEXTROSE MONOHYDRATE, SODIUM CHLORIDE, AND POTASSIUM CHLORIDE 150 ML/HR: 50; 4.5; 1.49 INJECTION, SOLUTION INTRAVENOUS at 19:38

## 2021-02-09 RX ADMIN — SODIUM CHLORIDE 1000 ML: 9 INJECTION, SOLUTION INTRAVENOUS at 09:23

## 2021-02-09 RX ADMIN — ACETAMINOPHEN 650 MG: 325 TABLET, FILM COATED ORAL at 19:45

## 2021-02-09 RX ADMIN — SODIUM CHLORIDE 1000 ML: 9 INJECTION, SOLUTION INTRAVENOUS at 09:21

## 2021-02-09 RX ADMIN — SODIUM CHLORIDE, PRESERVATIVE FREE 3 ML: 5 INJECTION INTRAVENOUS at 20:44

## 2021-02-09 RX ADMIN — INSULIN HUMAN 4 UNITS/HR: 1 INJECTION, SOLUTION INTRAVENOUS at 09:28

## 2021-02-09 RX ADMIN — ENOXAPARIN SODIUM 40 MG: 40 INJECTION SUBCUTANEOUS at 12:56

## 2021-02-09 RX ADMIN — POTASSIUM CHLORIDE 10 MEQ: 7.46 INJECTION, SOLUTION INTRAVENOUS at 08:37

## 2021-02-09 RX ADMIN — ONDANSETRON 8 MG: 2 INJECTION, SOLUTION INTRAMUSCULAR; INTRAVENOUS at 07:44

## 2021-02-09 RX ADMIN — SODIUM CHLORIDE, PRESERVATIVE FREE 10 ML: 5 INJECTION INTRAVENOUS at 20:44

## 2021-02-09 RX ADMIN — HUMAN INSULIN 5 UNITS: 100 INJECTION, SOLUTION SUBCUTANEOUS at 08:35

## 2021-02-09 NOTE — PROGRESS NOTES
"Adult Nutrition  Assessment/PES    Patient Name:  Yovanny Solitario  YOB: 1975  MRN: [id number]  Admit Date:  2/9/2021    Assessment Date:  2/9/2021    Comments:  Advance diet as indicated per DKA protocol to consistent CHO.  Consider GI consult due to c/o nausea w eating for 2 years.  Will cont to follow and provide education.    Reason for Assessment     Row Name 02/09/21 1431          Reason for Assessment    Reason For Assessment  physician consult     Diagnosis  diabetes diagnosis/complications N/V DKA hx DM 2010 ( per pt)         Nutrition/Diet History     Row Name 02/09/21 1431          Nutrition/Diet History    Typical Food/Fluid Intake  Pt struggling to talk, dry mouth, Reports new to [place] 9 months ago. Finally has job she wants so didn't tell them she was DM, worried about missing work. Reports 2010 Dx DM type 2, genetic she stated. Reports not good with numbers & has been having nausea w eating off/on for 2 yrs.     Meal/Snack Patterns  8-9 am coffee/cream  1230 fries, potter, ketchup dinner at home; rice & ludwin or spinach/tomato/mozarella, sometimes plain waffle         Anthropometrics     Row Name 02/09/21 1433 02/09/21 0720       Anthropometrics    Height  --  152.4 cm (60\")    Weight  -- 130# , stated ?  59 kg (130 lb)       Ideal Body Weight (IBW)    Ideal Body Weight (IBW) (kg)  --  45.86    % Ideal Body Weight  --  128.58       Usual Body Weight (UBW)    Weight Loss Time Frame  8/2020 144# 1/2021 144#  --       Body Mass Index (BMI)    BMI (kg/m2)  --  25.44    BMI Assessment  BMI 25-29.9: overweight  --        Labs/Tests/Procedures/Meds     Row Name 02/09/21 1434          Labs/Procedures/Meds    Lab Results Reviewed  reviewed     Lab Results Comments  HgA1c 10.6 H        Diagnostic Tests/Procedures    Diagnostic Test/Procedure Reviewed  reviewed        Medications    Pertinent Medications Reviewed  reviewed     Pertinent Medications Comments  insulin IV           Estimated/Assessed Needs  " "   Row Name 02/09/21 1434 02/09/21 0720       Calculation Measurements    Height  --  152.4 cm (60\")       Estimated/Assessed Needs    Additional Documentation  Calorie Requirements (Group);Fluid Requirements (Group);Protein Requirements (Group)  --       Calorie Requirements    Estimated Calorie Need Method  [place]-St Jeor  --    Estimated Calorie Requirement Comment  1400 kcal ( mifflin 1.2 ) 158 gm CHO, 45% kcal  --       Protein Requirements    Est Protein Requirement Amount (gms/kg)  1.0 gm protein 59 gm pro  --       Fluid Requirements    Estimated Fluid Requirement Method  RDA Method 1400 ml  --        Nutrition Prescription Ordered     Row Name 02/09/21 1434          Nutrition Prescription PO    Current PO Diet  NPO         Evaluation of Received Nutrient/Fluid Intake     Row Name 02/09/21 1435          Fluid Intake Evaluation    IV Fluid (mL)  -- insufficient data        PO Evaluation    Number of Days PO Intake Evaluated  Insufficient Data               Problem/Interventions:  Problem 1     Row Name 02/09/21 1435          Nutrition Diagnoses Problem 1    Problem 1  Knowledge Deficit     Etiology (related to)  Medical Diagnosis     Endocrine  DM2     Signs/Symptoms (evidenced by)  Reported  Information Deficit               Intervention Goal     Row Name 02/09/21 1435          Intervention Goal    General  Provide information regarding MNT for treatment/condition;Disease management/therapy;Meet nutritional needs for age/condition     PO  Establish PO;PO intake (%)     PO Intake %  50 % or greater     Weight  No significant weight loss         Nutrition Intervention     Row Name 02/09/21 1435          Nutrition Intervention    RD/Tech Action  Interview for preference;Follow Tx progress         Nutrition Prescription     Row Name 02/09/21 1435          Other Orders    Other  diet per DKA protocol         Education/Evaluation     Row Name 02/09/21 1435          Education    Education  Education topics;Will " Instruct as appropriate     Education Topics  Diabetes        Monitor/Evaluation    Monitor  Per protocol;I&O;PO intake;Pertinent labs;Weight;Symptoms           Electronically signed by:  Kate Mckeon RD  02/09/21 14:36 EST

## 2021-02-09 NOTE — H&P
[facility] HOSPITALIST     Tatyana Delgado MD    CHIEF COMPLAINT: Nausea and vomiting for 3 days after starting Synjardy and Trulicity      HISTORY OF PRESENT ILLNESS:    Ms. Solitario is a pleasant, 44 y/o HF who presents w/ a 3-day history of continued nausea and vomiting after starting Synjardy and Trulicity on Saturday.  She reports she has been off of a diabetic regimen for almost a year after changing jobs.  She reports good glucose control in the past and in fact, she has been on Synjardy before w/o issue.  She reports shortly after taking her medicine, she became nauseated and had emesis.  She does not think she threw-up her medicine.  She denies blood in her emesis as well.  Anything she tried to take PO (Pedialyte) was rejected.  She denies abdominal pain.  Starting this morning, she began having non-bloody diarrhea.  She began to feel weak and was having chest pressure from throwing up so much.  She denies fever and chills, but staff note she was clammy when she arrived in the unit.  She denies sick contacts.  She does note some occasional burning and tingling in her toes and sometimes her fingers.      Past Medical History:   Diagnosis Date   • Diabetes mellitus (CMS/HCC)      Past Surgical History:   Procedure Laterality Date   • BREAST SURGERY     •  SECTION     • CHOLECYSTECTOMY     • SUBTOTAL HYSTERECTOMY       Family History   Problem Relation Age of Onset   • Diabetes Mother    • Hyperlipidemia Mother    • Hypertension Mother    • Brain cancer Father    • Heart disease Maternal Grandmother    • Hyperlipidemia Maternal Grandmother    • Diabetes Maternal Grandmother    • Hypertension Maternal Grandmother      Social History     Tobacco Use   • Smoking status: Light Tobacco Smoker     Types: Cigarettes   • Smokeless tobacco: Never Used   Substance Use Topics   • Alcohol use: Yes   • Drug use: Never     Medications Prior to Admission   Medication Sig Dispense Refill  "Last Dose   • Biotin 1000 MCG chewable tablet Chew.   2/5/2021 at 0900   • cholecalciferol (VITAMIN D3) 25 MCG (1000 UT) tablet Take 2,000 Units by mouth Daily.   2/5/2021 at 0900   • Dulaglutide (Trulicity) 1.5 MG/0.5ML solution pen-injector Inject 1.5 mg under the skin into the appropriate area as directed Every 7 (Seven) Days. 4 pen 0 2/6/2021 at 1000   • Empagliflozin-metFORMIN HCl (Synjardy) 12.5-1000 MG tablet Take 2 tablets by mouth Daily. 60 tablet 0 2/6/2021 at 2200   • glucose blood test strip Use as instructed 200 each 12 2/8/2021   • glucose monitor monitoring kit 1 each As Needed (Diabetes 2). 1 each 0 2/8/2021   • Lancets misc 1 each 2 (Two) Times a Day. 200 each 5    • Multiple Vitamins-Minerals (MULTIVITAMIN ADULTS PO) Take  by mouth.   2/5/2021 at 0900     Allergies:  Morphine, Latex, and Penicillins    REVIEW OF SYSTEMS:  Please see the above history of present illness for pertinent positives and negatives.  The remainder of the patient's systems have been reviewed and are negative.    Vital Signs  Temp:  [98.1 °F (36.7 °C)-98.2 °F (36.8 °C)] 98.2 °F (36.8 °C)  Heart Rate:  [126-146] 138  Resp:  [15-18] 18  BP: (117-140)/(51-95) 129/65  Oxygen Therapy  SpO2: 100 %  Device (Oxygen Therapy): room air}  Body mass index is 25.39 kg/m².     Flowsheet Rows      First Filed Value   Admission Height  152.4 cm (60\") Documented at 02/09/2021 0720   Admission Weight  59 kg (130 lb) Documented at 02/09/2021 0720           Physical Exam:  Physical Exam   Constitutional: Patient appears well-developed and well-nourished and in no acute distress.  Appears stated age.   HEENT:   Head: Normocephalic and atraumatic.   Eyes:  Pupils are equal, round, and reactive to light. EOM are intact. Sclerae are anicteric and noninjected.  Mouth and Throat: Patient has tacky mucous membranes. Oropharynx is clear of any erythema or exudate. Neck: Neck supple. No JVD present. No thyromegaly present. No lymphadenopathy " present.  Cardiovascular: Regular rate, regular rhythm, S1 normal and S2 normal.  Exam reveals no gallop and no friction rub.  No murmur heard.  Radial and pedal pulses are 2+ and symmetric.  Pulmonary/Chest: Lungs are clear to auscultation bilaterally. No respiratory distress. No wheezes. No rhonchi. No rales.   Abdominal: Soft. Bowel sounds are normal. There is no tenderness.   Musculoskeletal: Normal muscle tone  Extremities: No edema. No asymmetry.  Neurological: Cranial nerves II-XII are grossly intact with no focal deficits.  Sensation to light touch is intact over the feet.  Skin: Skin is warm. No rash noted. Nails show no clubbing.  No cyanosis or erythema.    Emotional Behavior:    Judgement and Insight: Normal   Mental Status:  Alertness  Normal   Memory:  Appears intact   Mood and Affect:         Depression  None               Anxiety  None    Debilities:   Physical Weakness  As per HPI   Handicaps  None   Disabilities  None   Agitation  None     Results Review:    I reviewed the patient's new clinical results.  Lab Results (most recent)     Procedure Component Value Units Date/Time    Phosphorus [[id number]]  (Normal) Collected: 02/09/21 1210    Specimen: Blood Updated: 02/09/21 1238     Phosphorus 2.8 mg/dL     Basic Metabolic Panel [[id number]]  (Abnormal) Collected: 02/09/21 1210    Specimen: Blood Updated: 02/09/21 1237     Glucose 195 mg/dL      BUN 14 mg/dL      Creatinine 0.82 mg/dL      Sodium 139 mmol/L      Potassium 4.5 mmol/L      Chloride 110 mmol/L      CO2 11.9 mmol/L      Calcium 7.7 mg/dL      eGFR  African Amer 92 mL/min/1.73      eGFR Non African Amer 76 mL/min/1.73      BUN/Creatinine Ratio 17.1     Anion Gap 17.1 mmol/L     Narrative:      GFR Normal >60  Chronic Kidney Disease <60  Kidney Failure <15      Lactic Acid, Reflex [[id number]]  (Abnormal) Collected: 02/09/21 1211    Specimen: Blood Updated: 02/09/21 1237     Lactate 2.1 mmol/L     Magnesium [[id number]]  (Normal) Collected:  02/09/21 1210    Specimen: Blood Updated: 02/09/21 1234     Magnesium 2.0 mg/dL     Lactic Acid, Reflex Timer (This will reflex a repeat order 3-3:15 hours after ordered.) [[id number]] Collected: 02/09/21 0815    Specimen: Blood Updated: 02/09/21 1146     Hold Tube Hold for add-ons.     Comment: Auto resulted.       POC Glucose Once [[id number]]  (Abnormal) Collected: 02/09/21 1113    Specimen: Blood Updated: 02/09/21 1139     Glucose 252 mg/dL     Troponin [[id number]]  (Normal) Collected: 02/09/21 0741    Specimen: Blood Updated: 02/09/21 1038     Troponin T <0.010 ng/mL     Narrative:      Troponin T Reference Range:  <= 0.03 ng/mL-   Negative for AMI  >0.03 ng/mL-     Abnormal for myocardial necrosis.  Clinicians would have to utilize clinical acumen, EKG, Troponin and serial changes to determine if it is an Acute Myocardial Infarction or myocardial injury due to an underlying chronic condition.       Results may be falsely decreased if patient taking Biotin.      Hemoglobin A1c [[id number]]  (Abnormal) Collected: 02/09/21 0741    Specimen: Blood Updated: 02/09/21 1038     Hemoglobin A1C 10.60 %     Narrative:      Hemoglobin A1C Ranges:    Increased Risk for Diabetes  5.7% to 6.4%  Diabetes                     >= 6.5%  Diabetic Goal                < 7.0%    Phosphorus [[id number]]  (Abnormal) Collected: 02/09/21 0741    Specimen: Blood Updated: 02/09/21 1033     Phosphorus 7.0 mg/dL     Magnesium [[id number]]  (Normal) Collected: 02/09/21 0741    Specimen: Blood Updated: 02/09/21 1033     Magnesium 2.4 mg/dL     Blood Culture - Blood, Arm, Right [[id number]] Collected: 02/09/21 0922    Specimen: Blood from Arm, Right Updated: 02/09/21 0936    POC Glucose Once [[id number]]  (Abnormal) Collected: 02/09/21 0917    Specimen: Blood Updated: 02/09/21 0924     Glucose 294 mg/dL     COVID PRE-OP / PRE-PROCEDURE SCREENING ORDER (NO ISOLATION) - Swab, Nasal Cavity [[id number]]  (Normal) Collected: 02/09/21 0837    Specimen: Swab  from Nasal Cavity Updated: 02/09/21 0924    Narrative:      The following orders were created for panel order COVID PRE-OP / PRE-PROCEDURE SCREENING ORDER (NO ISOLATION) - Swab, Nasal Cavity.  Procedure                               Abnormality         Status                     ---------                               -----------         ------                     COVID-19,[facility] [place]...[[id number]]  Normal              Final result                 Please view results for these tests on the individual orders.    COVID-19,[facility] IN-HOUSE,Nasal Swab No Transport Media 3-4 HR TAT - Swab, Nasal Cavity [[id number]]  (Normal) Collected: 02/09/21 0837    Specimen: Swab from Nasal Cavity Updated: 02/09/21 0924     COVID19 Not Detected    Narrative:      Fact sheet for providers: https://www.fda.gov/media/948734/download     Fact sheet for patients: https://www.fda.gov/media/352591/download    Test performed by PCR.    Consider negative results in combination with clinical observations, patient history, and epidemiological information.    Blood Gas, Venous - [[id number]]  (Abnormal) Collected: 02/09/21 0845    Specimen: Venous Blood Updated: 02/09/21 0857     Site OTHER     pH, Venous 7.015 pH Units      Comment: 85 Value below critical limit        pCO2, Venous 41.7 mm Hg      pO2, Venous 35.0 mm Hg      HCO3, Venous 10.6 mmol/L      Comment: 84 Value below reference range        Base Excess, Venous -20.2 mmol/L      Comment: 84 Value below reference range        O2 Saturation, Venous 54.1 %      Hemoglobin, Blood Gas 17.5 g/dL      Temperature 37.0 C      Barometric Pressure for Blood Gas 743 mmHg      Modality Room Air     Ventilator Mode NA     Comment: Meter: [id number]     :  [id number]        Notified Who RB AND V BEBA RICH RN AT 0855     Notified By [id number]     Notified Time 02/09/2021 08:56    Urinalysis With Culture If Indicated - Urine, Clean Catch [[id number]]  (Abnormal) Collected: 02/09/21 0834     Specimen: Urine, Clean Catch Updated: 02/09/21 0856     Color, UA Yellow     Appearance, UA Clear     pH, UA <=5.0     Specific Gravity, UA 1.025     Glucose,  mg/dL (2+)     Ketones, UA >=160 mg/dL (4+)     Bilirubin, UA Negative     Blood, UA Negative     Protein, UA Trace     Leuk Esterase, UA Negative     Nitrite, UA Negative     Urobilinogen, UA 0.2 E.U./dL    Narrative:      Urine microscopic not indicated.    Blood Culture - Blood, Arm, Right [[id number]] Collected: 02/09/21 0820    Specimen: Blood from Arm, Right Updated: 02/09/21 0853    Lactic Acid, Plasma [[id number]]  (Abnormal) Collected: 02/09/21 0815    Specimen: Blood Updated: 02/09/21 0842     Lactate 3.3 mmol/L     Procalcitonin [[id number]]  (Normal) Collected: 02/09/21 0741    Specimen: Blood Updated: 02/09/21 0837     Procalcitonin 0.09 ng/mL     Narrative:      Results may be falsely decreased if patient taking Biotin.     Comprehensive Metabolic Panel [[id number]]  (Abnormal) Collected: 02/09/21 0741    Specimen: Blood Updated: 02/09/21 0807     Glucose 326 mg/dL      BUN 18 mg/dL      Creatinine 1.06 mg/dL      Sodium 138 mmol/L      Potassium 4.0 mmol/L      Chloride 96 mmol/L      CO2 11.4 mmol/L      Calcium 9.9 mg/dL      Total Protein 9.3 g/dL      Albumin 5.50 g/dL      ALT (SGPT) 38 U/L      AST (SGOT) 17 U/L      Alkaline Phosphatase 160 U/L      Total Bilirubin 1.2 mg/dL      eGFR Non African Amer 57 mL/min/1.73      eGFR  African Amer 69 mL/min/1.73      Globulin 3.8 gm/dL      A/G Ratio 1.4 g/dL      BUN/Creatinine Ratio 17.0     Anion Gap 30.6 mmol/L     Narrative:      GFR Normal >60  Chronic Kidney Disease <60  Kidney Failure <15      hCG, Serum, Qualitative [[id number]]  (Normal) Collected: 02/09/21 0741    Specimen: Blood Updated: 02/09/21 0758     HCG Qualitative Negative    CBC & Differential [[id number]]  (Abnormal) Collected: 02/09/21 0741    Specimen: Blood Updated: 02/09/21 0749    Narrative:      The following orders  were created for panel order CBC & Differential.  Procedure                               Abnormality         Status                     ---------                               -----------         ------                     CBC Auto Differential[[id number]]        Abnormal            Final result                 Please view results for these tests on the individual orders.    CBC Auto Differential [[id number]]  (Abnormal) Collected: 02/09/21 0741    Specimen: Blood Updated: 02/09/21 0749     WBC 18.06 10*3/mm3      RBC 6.05 10*6/mm3      Hemoglobin 18.5 g/dL      Hematocrit 53.2 %      MCV 87.9 fL      MCH 30.6 pg      MCHC 34.8 g/dL      RDW 12.6 %      RDW-SD 40.3 fl      MPV 11.7 fL      Platelets 330 10*3/mm3      Neutrophil % 89.2 %      Lymphocyte % 8.0 %      Monocyte % 1.7 %      Eosinophil % 0.1 %      Basophil % 0.2 %      Immature Grans % 0.8 %      Neutrophils, Absolute 16.12 10*3/mm3      Lymphocytes, Absolute 1.45 10*3/mm3      Monocytes, Absolute 0.31 10*3/mm3      Eosinophils, Absolute 0.01 10*3/mm3      Basophils, Absolute 0.03 10*3/mm3      Immature Grans, Absolute 0.14 10*3/mm3           Imaging Results (Most Recent)     Procedure Component Value Units Date/Time    XR Chest 1 View [[id number]] Collected: 02/09/21 0919     Updated: 02/09/21 0922    Narrative:      CHEST X-RAY, 02/09/2021         HISTORY:  45-year-old female in the ED complaining of 3 day history weakness,  nausea and mid chest pain.     TECHNIQUE:  AP portable upright chest x-ray.     FINDINGS:  The lungs are expanded and clear. No visible focal or multifocal  pulmonary infiltrate. No pleural effusion. Heart size and pulmonary  vascularity are normal.       Impression:      Negative chest.     This report was finalized on 2/9/2021 9:20 AM by Dr. Brandon Wyatt MD.           reviewed    ECG/EMG Results (most recent)     Procedure Component Value Units Date/Time    ECG 12 Lead [[id number]] Collected: 02/09/21 0744     Updated:  02/09/21 0844     QT Interval 324 ms     Narrative:      HEART RATE= 134  bpm  RR Interval= 448  ms  MA Interval= 120  ms  P Horizontal Axis= 29  deg  P Front Axis= 61  deg  QRSD Interval= 76  ms  QT Interval= 324  ms  QRS Axis= 15  deg  T Wave Axis= 16  deg  - OTHERWISE NORMAL ECG -  Sinus tachycardia  NO PRIOR TRACING AVAILABLE FOR COMPARISON  Electronically Signed By: Will Carmichael ([facility]) 09-Feb-2021 08:43:55  Date and Time of Study: 2021-02-09 07:44:45        Reviewed.  No prior tracing in our system or Care Everywhere    Assessment/Plan     1.  DKA: Etiology unclear, but certainly can be seen as a side effect to Jardiance.  However, she only took this dose so I doubt this as a cause.  UA unremarkable for infection.  CXR clear, but she is volume deplete.  Procalcitonin is low.  Troponin is negative.  She has been out of medication until Saturday, but it appears she has been on Trulicity and Metformin according to a clinic note from Dr. Currie in August 2020.  Will continue protocol, and I will speak to Dr. Delgado about medication changes since she may not be able to tolerate Synjardy.  Check UDS.    2.  Lactic Acidosis: I suspect this is due to volume contraction.  Not septic.  No indication for abx.      I discussed the patients findings and my recommendations with patient.     Enmanuel Mcdaniels MD  02/09/21  13:02 EST

## 2021-02-09 NOTE — PLAN OF CARE
Goal Outcome Evaluation:  Plan of Care Reviewed With: patient  Progress: improving  Outcome Summary: pt admitted to icu with dka. on insulin drip dka improving. resting well most of the day. lower back pain better with heat applied.

## 2021-02-09 NOTE — ED NOTES
Pt repeatedly asks for ice water, I informed pt that she is here for 3 day n/v and is receiving Zofran and Reglan, and after administration of the meds if her n/v has improved we will try a fluid challenge     Almita Avelar, RN  02/09/21 2456

## 2021-02-09 NOTE — ED PROVIDER NOTES
Subjective     Vomiting  The primary symptoms include vomiting and diarrhea. Primary symptoms do not include fever. The illness began 3 to 5 days ago.   The emesis contains stomach contents.   The diarrhea is watery.       Review of Systems   Constitutional: Negative for fever.   Gastrointestinal: Positive for diarrhea and vomiting.   All other systems reviewed and are negative.      Past Medical History:   Diagnosis Date   • Diabetes mellitus (CMS/HCC)        Allergies   Allergen Reactions   • Morphine Shortness Of Breath   • Latex Dermatitis   • Penicillins Hives       Past Surgical History:   Procedure Laterality Date   • BREAST SURGERY     •  SECTION     • CHOLECYSTECTOMY     • SUBTOTAL HYSTERECTOMY         Family History   Problem Relation Age of Onset   • Diabetes Mother    • Hyperlipidemia Mother    • Hypertension Mother    • Brain cancer Father    • Heart disease Maternal Grandmother    • Hyperlipidemia Maternal Grandmother    • Diabetes Maternal Grandmother    • Hypertension Maternal Grandmother        Social History     Socioeconomic History   • Marital status: Single     Spouse name: Not on file   • Number of children: Not on file   • Years of education: Not on file   • Highest education level: Not on file   Tobacco Use   • Smoking status: Light Tobacco Smoker     Types: Cigarettes   • Smokeless tobacco: Never Used   Substance and Sexual Activity   • Alcohol use: Yes   • Drug use: Never   • Sexual activity: Defer           Objective   Physical Exam  Vitals signs and nursing note reviewed.   Constitutional:       General: She is not in acute distress.     Appearance: Normal appearance. She is not ill-appearing or diaphoretic.   HENT:      Head: Normocephalic and atraumatic.      Nose: Nose normal. No rhinorrhea.      Mouth/Throat:      Mouth: Mucous membranes are moist.   Eyes:      Extraocular Movements: Extraocular movements intact.      Conjunctiva/sclera: Conjunctivae normal.      Pupils:  Pupils are equal, round, and reactive to light.   Neck:      Musculoskeletal: Normal range of motion and neck supple.   Cardiovascular:      Pulses: Normal pulses.      Heart sounds: No murmur.      Comments: tachy  Pulmonary:      Effort: Pulmonary effort is normal.      Breath sounds: Normal breath sounds. No rhonchi.   Abdominal:      General: Abdomen is flat. There is no distension.      Palpations: Abdomen is soft.      Tenderness: There is no abdominal tenderness. There is no guarding.   Musculoskeletal: Normal range of motion.         General: No tenderness.      Right lower leg: No edema.      Left lower leg: No edema.   Skin:     General: Skin is warm and dry.      Findings: No erythema or rash.   Neurological:      General: No focal deficit present.      Mental Status: She is alert and oriented to person, place, and time.      Cranial Nerves: No cranial nerve deficit.      Motor: No weakness.   Psychiatric:         Mood and Affect: Mood normal.         Behavior: Behavior normal.         Thought Content: Thought content normal.         Procedures           ED Course  ED Course as of Feb 09 0927   Tue Feb 09, 2021   0748 Elkg by me sinus 134, qrs nml, no st elev    [BC]   0917 Reeval, stable for admit    [BC]      ED Course User Index  [BC] Luis Baker MD         discussed with dr mar accepts admit                                  MDM  Number of Diagnoses or Management Options     Amount and/or Complexity of Data Reviewed  Clinical lab tests: ordered and reviewed  Tests in the radiology section of CPT®: ordered and reviewed  Discussion of test results with the performing providers: yes  Discuss the patient with other providers: yes    Risk of Complications, Morbidity, and/or Mortality  Presenting problems: high  Diagnostic procedures: high  Management options: high    Critical Care  Total time providing critical care: 30-74 minutes      Final diagnoses:   Diabetic ketoacidosis without coma  associated with other specified diabetes mellitus (CMS/HCC)   SIRS (systemic inflammatory response syndrome) (CMS/HCC)            Luis Baker MD  02/09/21 0974

## 2021-02-10 LAB
ALBUMIN SERPL-MCNC: 3.4 G/DL (ref 3.5–5.2)
ALBUMIN/GLOB SERPL: 1.5 G/DL
ALP SERPL-CCNC: 84 U/L (ref 39–117)
ALT SERPL W P-5'-P-CCNC: 17 U/L (ref 1–33)
ANION GAP SERPL CALCULATED.3IONS-SCNC: 12.4 MMOL/L (ref 5–15)
ANION GAP SERPL CALCULATED.3IONS-SCNC: 6.5 MMOL/L (ref 5–15)
ANION GAP SERPL CALCULATED.3IONS-SCNC: 7.2 MMOL/L (ref 5–15)
ANION GAP SERPL CALCULATED.3IONS-SCNC: 9.2 MMOL/L (ref 5–15)
ARTERIAL PATENCY WRIST A: NORMAL
AST SERPL-CCNC: 15 U/L (ref 1–32)
ATMOSPHERIC PRESS: 745 MMHG
ATMOSPHERIC PRESS: NORMAL MM[HG]
BASE EXCESS BLDA CALC-SCNC: NORMAL MMOL/L
BASE EXCESS BLDV CALC-SCNC: -12.8 MMOL/L (ref 0–2)
BASOPHILS # BLD AUTO: 0.04 10*3/MM3 (ref 0–0.2)
BASOPHILS NFR BLD AUTO: 0.3 % (ref 0–1.5)
BDY SITE: ABNORMAL
BDY SITE: NORMAL
BILIRUB SERPL-MCNC: 0.9 MG/DL (ref 0–1.2)
BODY TEMPERATURE: NORMAL
BUN SERPL-MCNC: 2 MG/DL (ref 6–20)
BUN SERPL-MCNC: 3 MG/DL (ref 6–20)
BUN SERPL-MCNC: 5 MG/DL (ref 6–20)
BUN SERPL-MCNC: 7 MG/DL (ref 6–20)
BUN/CREAT SERPL: 10.8 (ref 7–25)
BUN/CREAT SERPL: 4.7 (ref 7–25)
BUN/CREAT SERPL: 5 (ref 7–25)
BUN/CREAT SERPL: 7.8 (ref 7–25)
CALCIUM SPEC-SCNC: 6.7 MG/DL (ref 8.6–10.5)
CALCIUM SPEC-SCNC: 7.2 MG/DL (ref 8.6–10.5)
CALCIUM SPEC-SCNC: 7.6 MG/DL (ref 8.6–10.5)
CALCIUM SPEC-SCNC: 7.7 MG/DL (ref 8.6–10.5)
CHLORIDE SERPL-SCNC: 110 MMOL/L (ref 98–107)
CHLORIDE SERPL-SCNC: 111 MMOL/L (ref 98–107)
CHLORIDE SERPL-SCNC: 112 MMOL/L (ref 98–107)
CHLORIDE SERPL-SCNC: 114 MMOL/L (ref 98–107)
CO2 SERPL-SCNC: 12.6 MMOL/L (ref 22–29)
CO2 SERPL-SCNC: 15.8 MMOL/L (ref 22–29)
CO2 SERPL-SCNC: 18.8 MMOL/L (ref 22–29)
CO2 SERPL-SCNC: 21.5 MMOL/L (ref 22–29)
CREAT SERPL-MCNC: 0.43 MG/DL (ref 0.57–1)
CREAT SERPL-MCNC: 0.6 MG/DL (ref 0.57–1)
CREAT SERPL-MCNC: 0.64 MG/DL (ref 0.57–1)
CREAT SERPL-MCNC: 0.65 MG/DL (ref 0.57–1)
DEPRECATED RDW RBC AUTO: 40.7 FL (ref 37–54)
EOSINOPHIL # BLD AUTO: 0.04 10*3/MM3 (ref 0–0.4)
EOSINOPHIL NFR BLD AUTO: 0.3 % (ref 0.3–6.2)
ERYTHROCYTE [DISTWIDTH] IN BLOOD BY AUTOMATED COUNT: 12.7 % (ref 12.3–15.4)
GFR SERPL CREATININE-BSD FRML MDRD: 101 ML/MIN/1.73
GFR SERPL CREATININE-BSD FRML MDRD: 109 ML/MIN/1.73
GFR SERPL CREATININE-BSD FRML MDRD: 121 ML/MIN/1.73
GFR SERPL CREATININE-BSD FRML MDRD: 123 ML/MIN/1.73
GFR SERPL CREATININE-BSD FRML MDRD: 132 ML/MIN/1.73
GFR SERPL CREATININE-BSD FRML MDRD: 99 ML/MIN/1.73
GFR SERPL CREATININE-BSD FRML MDRD: >150 ML/MIN/1.73
GFR SERPL CREATININE-BSD FRML MDRD: >150 ML/MIN/1.73
GLOBULIN UR ELPH-MCNC: 2.3 GM/DL
GLUCOSE BLDC GLUCOMTR-MCNC: 111 MG/DL (ref 70–130)
GLUCOSE BLDC GLUCOMTR-MCNC: 150 MG/DL (ref 70–130)
GLUCOSE BLDC GLUCOMTR-MCNC: 152 MG/DL (ref 70–130)
GLUCOSE BLDC GLUCOMTR-MCNC: 153 MG/DL (ref 70–130)
GLUCOSE BLDC GLUCOMTR-MCNC: 156 MG/DL (ref 70–130)
GLUCOSE BLDC GLUCOMTR-MCNC: 162 MG/DL (ref 70–130)
GLUCOSE BLDC GLUCOMTR-MCNC: 165 MG/DL (ref 70–130)
GLUCOSE BLDC GLUCOMTR-MCNC: 169 MG/DL (ref 70–130)
GLUCOSE BLDC GLUCOMTR-MCNC: 173 MG/DL (ref 70–130)
GLUCOSE BLDC GLUCOMTR-MCNC: 175 MG/DL (ref 70–130)
GLUCOSE BLDC GLUCOMTR-MCNC: 187 MG/DL (ref 70–130)
GLUCOSE BLDC GLUCOMTR-MCNC: 195 MG/DL (ref 70–130)
GLUCOSE BLDC GLUCOMTR-MCNC: 201 MG/DL (ref 70–130)
GLUCOSE BLDC GLUCOMTR-MCNC: 201 MG/DL (ref 70–130)
GLUCOSE BLDC GLUCOMTR-MCNC: 206 MG/DL (ref 70–130)
GLUCOSE BLDC GLUCOMTR-MCNC: 214 MG/DL (ref 70–130)
GLUCOSE BLDC GLUCOMTR-MCNC: 223 MG/DL (ref 70–130)
GLUCOSE BLDC GLUCOMTR-MCNC: 223 MG/DL (ref 70–130)
GLUCOSE SERPL-MCNC: 147 MG/DL (ref 65–99)
GLUCOSE SERPL-MCNC: 165 MG/DL (ref 65–99)
GLUCOSE SERPL-MCNC: 178 MG/DL (ref 65–99)
GLUCOSE SERPL-MCNC: 219 MG/DL (ref 65–99)
HCO3 BLDA-SCNC: NORMAL MMOL/L
HCO3 BLDV-SCNC: 14.2 MMOL/L (ref 22–28)
HCT VFR BLD AUTO: 36.3 % (ref 34–46.6)
HCT VFR BLD CALC: NORMAL %
HGB BLD-MCNC: 12.8 G/DL (ref 12–15.9)
HGB BLDA-MCNC: 13.6 G/DL (ref 13.5–17.5)
HGB BLDA-MCNC: NORMAL G/DL
IMM GRANULOCYTES # BLD AUTO: 0.07 10*3/MM3 (ref 0–0.05)
IMM GRANULOCYTES NFR BLD AUTO: 0.5 % (ref 0–0.5)
LYMPHOCYTES # BLD AUTO: 1.9 10*3/MM3 (ref 0.7–3.1)
LYMPHOCYTES NFR BLD AUTO: 13.3 % (ref 19.6–45.3)
Lab: ABNORMAL
Lab: ABNORMAL
Lab: NORMAL
MAGNESIUM SERPL-MCNC: 1.9 MG/DL (ref 1.6–2.6)
MAGNESIUM SERPL-MCNC: 2.3 MG/DL (ref 1.6–2.6)
MAGNESIUM SERPL-MCNC: 2.5 MG/DL (ref 1.6–2.6)
MAGNESIUM SERPL-MCNC: 3.2 MG/DL (ref 1.6–2.6)
MCH RBC QN AUTO: 30.9 PG (ref 26.6–33)
MCHC RBC AUTO-ENTMCNC: 35.3 G/DL (ref 31.5–35.7)
MCV RBC AUTO: 87.7 FL (ref 79–97)
MODALITY: ABNORMAL
MODALITY: NORMAL
MONOCYTES # BLD AUTO: 0.97 10*3/MM3 (ref 0.1–0.9)
MONOCYTES NFR BLD AUTO: 6.8 % (ref 5–12)
NEUTROPHILS NFR BLD AUTO: 11.25 10*3/MM3 (ref 1.7–7)
NEUTROPHILS NFR BLD AUTO: 78.8 % (ref 42.7–76)
NOTIFIED BY: ABNORMAL
NOTIFIED BY: NORMAL
NOTIFIED WHO: ABNORMAL
NOTIFIED WHO: NORMAL
NRBC BLD AUTO-RTO: 0 /100 WBC (ref 0–0.2)
PCO2 BLDA: NORMAL MM[HG]
PCO2 BLDV: 35.7 MM HG (ref 41–51)
PCO2 TEMP ADJ BLD: NORMAL MM[HG]
PH BLDA: NORMAL [PH]
PH BLDV: 7.21 PH UNITS (ref 7.32–7.42)
PH, TEMP CORRECTED: NORMAL
PHOSPHATE SERPL-MCNC: 1.7 MG/DL (ref 2.5–4.5)
PHOSPHATE SERPL-MCNC: 2.3 MG/DL (ref 2.5–4.5)
PHOSPHATE SERPL-MCNC: 2.4 MG/DL (ref 2.5–4.5)
PHOSPHATE SERPL-MCNC: 2.7 MG/DL (ref 2.5–4.5)
PLATELET # BLD AUTO: 249 10*3/MM3 (ref 140–450)
PMV BLD AUTO: 11.4 FL (ref 6–12)
PO2 BLDA: NORMAL MM[HG]
PO2 BLDV: 31.1 MM HG (ref 27–53)
PO2 TEMP ADJ BLD: NORMAL MM[HG]
POTASSIUM SERPL-SCNC: 3.3 MMOL/L (ref 3.5–5.2)
POTASSIUM SERPL-SCNC: 4 MMOL/L (ref 3.5–5.2)
POTASSIUM SERPL-SCNC: 4.4 MMOL/L (ref 3.5–5.2)
POTASSIUM SERPL-SCNC: 4.5 MMOL/L (ref 3.5–5.2)
PROCALCITONIN SERPL-MCNC: 0.08 NG/ML (ref 0–0.25)
PROT SERPL-MCNC: 5.7 G/DL (ref 6–8.5)
RBC # BLD AUTO: 4.14 10*6/MM3 (ref 3.77–5.28)
SAO2 % BLDCOA: NORMAL %
SAO2 % BLDCOV: 62 % (ref 45–75)
SODIUM SERPL-SCNC: 135 MMOL/L (ref 136–145)
SODIUM SERPL-SCNC: 137 MMOL/L (ref 136–145)
SODIUM SERPL-SCNC: 139 MMOL/L (ref 136–145)
SODIUM SERPL-SCNC: 140 MMOL/L (ref 136–145)
VENTILATOR MODE: NORMAL
WBC # BLD AUTO: 14.27 10*3/MM3 (ref 3.4–10.8)

## 2021-02-10 PROCEDURE — 82803 BLOOD GASES ANY COMBINATION: CPT

## 2021-02-10 PROCEDURE — 82962 GLUCOSE BLOOD TEST: CPT

## 2021-02-10 PROCEDURE — 80053 COMPREHEN METABOLIC PANEL: CPT | Performed by: NURSE PRACTITIONER

## 2021-02-10 PROCEDURE — 99232 SBSQ HOSP IP/OBS MODERATE 35: CPT | Performed by: HOSPITALIST

## 2021-02-10 PROCEDURE — 84145 PROCALCITONIN (PCT): CPT | Performed by: HOSPITALIST

## 2021-02-10 PROCEDURE — 84100 ASSAY OF PHOSPHORUS: CPT | Performed by: HOSPITALIST

## 2021-02-10 PROCEDURE — 86337 INSULIN ANTIBODIES: CPT | Performed by: HOSPITALIST

## 2021-02-10 PROCEDURE — 82803 BLOOD GASES ANY COMBINATION: CPT | Performed by: NURSE PRACTITIONER

## 2021-02-10 PROCEDURE — 63710000001 INSULIN DETEMIR PER 5 UNITS: Performed by: HOSPITALIST

## 2021-02-10 PROCEDURE — 25010000002 ENOXAPARIN PER 10 MG: Performed by: HOSPITALIST

## 2021-02-10 PROCEDURE — 25010000002 ONDANSETRON PER 1 MG: Performed by: NURSE PRACTITIONER

## 2021-02-10 PROCEDURE — 86341 ISLET CELL ANTIBODY: CPT | Performed by: HOSPITALIST

## 2021-02-10 PROCEDURE — 80048 BASIC METABOLIC PNL TOTAL CA: CPT | Performed by: HOSPITALIST

## 2021-02-10 PROCEDURE — 83735 ASSAY OF MAGNESIUM: CPT | Performed by: HOSPITALIST

## 2021-02-10 PROCEDURE — 36600 WITHDRAWAL OF ARTERIAL BLOOD: CPT

## 2021-02-10 PROCEDURE — 85025 COMPLETE CBC W/AUTO DIFF WBC: CPT | Performed by: HOSPITALIST

## 2021-02-10 PROCEDURE — 25010000002 ONDANSETRON PER 1 MG: Performed by: HOSPITALIST

## 2021-02-10 RX ORDER — METOCLOPRAMIDE HYDROCHLORIDE 5 MG/ML
5 INJECTION INTRAMUSCULAR; INTRAVENOUS EVERY 4 HOURS PRN
Status: DISCONTINUED | OUTPATIENT
Start: 2021-02-10 | End: 2021-02-11 | Stop reason: HOSPADM

## 2021-02-10 RX ORDER — ACETAMINOPHEN 325 MG/1
650 TABLET ORAL EVERY 4 HOURS PRN
Status: DISCONTINUED | OUTPATIENT
Start: 2021-02-10 | End: 2021-02-11 | Stop reason: HOSPADM

## 2021-02-10 RX ADMIN — INSULIN HUMAN 1.5 UNITS/HR: 1 INJECTION, SOLUTION INTRAVENOUS at 12:35

## 2021-02-10 RX ADMIN — ENOXAPARIN SODIUM 40 MG: 40 INJECTION SUBCUTANEOUS at 11:55

## 2021-02-10 RX ADMIN — ACETAMINOPHEN 650 MG: 325 TABLET, FILM COATED ORAL at 00:11

## 2021-02-10 RX ADMIN — SODIUM CHLORIDE, PRESERVATIVE FREE 10 ML: 5 INJECTION INTRAVENOUS at 19:27

## 2021-02-10 RX ADMIN — SODIUM PHOSPHATE, MONOBASIC, MONOHYDRATE 15 MMOL: 276; 142 INJECTION, SOLUTION INTRAVENOUS at 09:07

## 2021-02-10 RX ADMIN — POTASSIUM PHOSPHATE, MONOBASIC AND POTASSIUM PHOSPHATE, DIBASIC 30 MMOL: 224; 236 INJECTION, SOLUTION INTRAVENOUS at 19:27

## 2021-02-10 RX ADMIN — DEXTROSE MONOHYDRATE, SODIUM CHLORIDE, AND POTASSIUM CHLORIDE 150 ML/HR: 50; 4.5; 1.49 INJECTION, SOLUTION INTRAVENOUS at 10:10

## 2021-02-10 RX ADMIN — SODIUM CHLORIDE, PRESERVATIVE FREE 10 ML: 5 INJECTION INTRAVENOUS at 09:14

## 2021-02-10 RX ADMIN — SODIUM CHLORIDE, PRESERVATIVE FREE 3 ML: 5 INJECTION INTRAVENOUS at 09:15

## 2021-02-10 RX ADMIN — INSULIN DETEMIR 10 UNITS: 100 INJECTION, SOLUTION SUBCUTANEOUS at 18:29

## 2021-02-10 RX ADMIN — ONDANSETRON 4 MG: 2 INJECTION, SOLUTION INTRAMUSCULAR; INTRAVENOUS at 00:11

## 2021-02-10 RX ADMIN — DEXTROSE MONOHYDRATE, SODIUM CHLORIDE, AND POTASSIUM CHLORIDE 150 ML/HR: 50; 4.5; 1.49 INJECTION, SOLUTION INTRAVENOUS at 02:37

## 2021-02-10 NOTE — PAYOR COMM NOTE
"[facility]   &  [facility]  [address]    [address] [place] [address]    [place] [address]    Robert Lawrencebethanygenevieve  Utilization Review/Room Reservations  Phone: [phone], [phone] or [phone]  Fax: [phone]  Email: [email or web address]  Please call, fax back, or email with authorization or any questions! Thanks!    REQUESTED CLINICAL  REF#[id number]          This fax contains any of the following:  Face Sheet, H&P, progress notes, consults, orders, meds, lab results, labor record, vitals, delivery worksheet, op note, d/c summary.  The information contained in this fax is confidential for the use of the Individual or entity named above. If the reader of this message is not the Intended recipient (or the employee or agent responsible to deliver it to the Intended recipient), you are hereby notified that any dissemination, distribution, or copy of this communication is prohibited. If you have received this communication in error, please notify us by collect telephone call and return the original message to us at the above address at our expense.  Yovanny Solitario (45 y.o. Female)     Date of Birth Social Security Number Address Home Phone MRN    1975  [address] [facility] [address] [phone] [id number]    Catholic Marital Status          None Single       Admission Date Admission Type Admitting Provider Attending Provider Department, Room/Bed    2/9/21 Emergency Enmanuel Mcdaniels MD Ellis, Rusty T, MD [facility] ICU, ICU6/1    Discharge Date Discharge Disposition Discharge Destination                       Attending Provider: Enmanuel Mcdaniels MD    Allergies: Morphine, Latex, Penicillins    Isolation: None   Infection: None   Code Status: CPR    Ht: 152.4 cm (60\")   Wt: 63.8 kg (140 lb 10.5 oz)    Admission Cmt: None   Principal Problem: None                Active Insurance as of 2/9/2021     " Primary Coverage     Payor Plan Insurance Group Employer/Plan Group    [facility] BLUE [employer]OSS [facility] [employer]UE [facility] PPO [id number]     Payor Plan Address Payor Plan Phone Number Payor Plan Fax Number Effective Dates    [address] [phone]  2020 - None Entered    [facility] [address]       Subscriber Name Subscriber Birth Date Member ID       KAVITA BALL 1975 [id number]                 Emergency Contacts      (Rel.) Home Phone Work Phone Mobile Phone    NADYA HINDS (Sister) [phone] -- --               History & Physical      Enmanuel Mcdaniels MD at 21 1302          [facility] HOSPITALIST     Tatyana Delgado MD    CHIEF COMPLAINT: Nausea and vomiting for 3 days after starting Synjardy and Trulicity      HISTORY OF PRESENT ILLNESS:    Ms. Ball is a pleasant, 46 y/o HF who presents w/ a 3-day history of continued nausea and vomiting after starting Synjardy and Trulicity on Saturday.  She reports she has been off of a diabetic regimen for almost a year after changing jobs.  She reports good glucose control in the past and in fact, she has been on Synjardy before w/o issue.  She reports shortly after taking her medicine, she became nauseated and had emesis.  She does not think she threw-up her medicine.  She denies blood in her emesis as well.  Anything she tried to take PO (Pedialyte) was rejected.  She denies abdominal pain.  Starting this morning, she began having non-bloody diarrhea.  She began to feel weak and was having chest pressure from throwing up so much.  She denies fever and chills, but staff note she was clammy when she arrived in the unit.  She denies sick contacts.  She does note some occasional burning and tingling in her toes and sometimes her fingers.      Past Medical History:   Diagnosis Date   • Diabetes mellitus (CMS/HCC)      Past Surgical History:   Procedure Laterality Date   • BREAST SURGERY     •  SECTION        • CHOLECYSTECTOMY     • SUBTOTAL HYSTERECTOMY       Family History   Problem Relation Age of Onset   • Diabetes Mother    • Hyperlipidemia Mother    • Hypertension Mother    • Brain cancer Father    • Heart disease Maternal Grandmother    • Hyperlipidemia Maternal Grandmother    • Diabetes Maternal Grandmother    • Hypertension Maternal Grandmother      Social History     Tobacco Use   • Smoking status: Light Tobacco Smoker     Types: Cigarettes   • Smokeless tobacco: Never Used   Substance Use Topics   • Alcohol use: Yes   • Drug use: Never     Medications Prior to Admission   Medication Sig Dispense Refill Last Dose   • Biotin 1000 MCG chewable tablet Chew.   2/5/2021 at 0900   • cholecalciferol (VITAMIN D3) 25 MCG (1000 UT) tablet Take 2,000 Units by mouth Daily.   2/5/2021 at 0900   • Dulaglutide (Trulicity) 1.5 MG/0.5ML solution pen-injector Inject 1.5 mg under the skin into the appropriate area as directed Every 7 (Seven) Days. 4 pen 0 2/6/2021 at 1000   • Empagliflozin-metFORMIN HCl (Synjardy) 12.5-1000 MG tablet Take 2 tablets by mouth Daily. 60 tablet 0 2/6/2021 at 2200   • glucose blood test strip Use as instructed 200 each 12 2/8/2021   • glucose monitor monitoring kit 1 each As Needed (Diabetes 2). 1 each 0 2/8/2021   • Lancets misc 1 each 2 (Two) Times a Day. 200 each 5    • Multiple Vitamins-Minerals (MULTIVITAMIN ADULTS PO) Take  by mouth.   2/5/2021 at 0900     Allergies:  Morphine, Latex, and Penicillins    REVIEW OF SYSTEMS:  Please see the above history of present illness for pertinent positives and negatives.  The remainder of the patient's systems have been reviewed and are negative.    Vital Signs  Temp:  [98.1 °F (36.7 °C)-98.2 °F (36.8 °C)] 98.2 °F (36.8 °C)  Heart Rate:  [126-146] 138  Resp:  [15-18] 18  BP: (117-140)/(51-95) 129/65  Oxygen Therapy  SpO2: 100 %  Device (Oxygen Therapy): room air}  Body mass index is 25.39 kg/m².     Flowsheet Rows      First Filed Value   Admission Height   "152.4 cm (60\") Documented at 02/09/2021 0720   Admission Weight  59 kg (130 lb) Documented at 02/09/2021 0720           Physical Exam:  Physical Exam   Constitutional: Patient appears well-developed and well-nourished and in no acute distress.  Appears stated age.   HEENT:   Head: Normocephalic and atraumatic.   Eyes:  Pupils are equal, round, and reactive to light. EOM are intact. Sclerae are anicteric and noninjected.  Mouth and Throat: Patient has tacky mucous membranes. Oropharynx is clear of any erythema or exudate. Neck: Neck supple. No JVD present. No thyromegaly present. No lymphadenopathy present.  Cardiovascular: Regular rate, regular rhythm, S1 normal and S2 normal.  Exam reveals no gallop and no friction rub.  No murmur heard.  Radial and pedal pulses are 2+ and symmetric.  Pulmonary/Chest: Lungs are clear to auscultation bilaterally. No respiratory distress. No wheezes. No rhonchi. No rales.   Abdominal: Soft. Bowel sounds are normal. There is no tenderness.   Musculoskeletal: Normal muscle tone  Extremities: No edema. No asymmetry.  Neurological: Cranial nerves II-XII are grossly intact with no focal deficits.  Sensation to light touch is intact over the feet.  Skin: Skin is warm. No rash noted. Nails show no clubbing.  No cyanosis or erythema.    Emotional Behavior:    Judgement and Insight: Normal   Mental Status:  Alertness  Normal   Memory:  Appears intact   Mood and Affect:         Depression  None               Anxiety  None    Debilities:   Physical Weakness  As per HPI   Handicaps  None   Disabilities  None   Agitation  None     Results Review:    I reviewed the patient's new clinical results.  Lab Results (most recent)     Procedure Component Value Units Date/Time    Phosphorus [[id number]]  (Normal) Collected: 02/09/21 1210    Specimen: Blood Updated: 02/09/21 1238     Phosphorus 2.8 mg/dL     Basic Metabolic Panel [[id number]]  (Abnormal) Collected: 02/09/21 1210    Specimen: Blood Updated: " 02/09/21 1237     Glucose 195 mg/dL      BUN 14 mg/dL      Creatinine 0.82 mg/dL      Sodium 139 mmol/L      Potassium 4.5 mmol/L      Chloride 110 mmol/L      CO2 11.9 mmol/L      Calcium 7.7 mg/dL      eGFR  African Amer 92 mL/min/1.73      eGFR Non African Amer 76 mL/min/1.73      BUN/Creatinine Ratio 17.1     Anion Gap 17.1 mmol/L     Narrative:      GFR Normal >60  Chronic Kidney Disease <60  Kidney Failure <15      Lactic Acid, Reflex [[id number]]  (Abnormal) Collected: 02/09/21 1211    Specimen: Blood Updated: 02/09/21 1237     Lactate 2.1 mmol/L     Magnesium [[id number]]  (Normal) Collected: 02/09/21 1210    Specimen: Blood Updated: 02/09/21 1234     Magnesium 2.0 mg/dL     Lactic Acid, Reflex Timer (This will reflex a repeat order 3-3:15 hours after ordered.) [[id number]] Collected: 02/09/21 0815    Specimen: Blood Updated: 02/09/21 1146     Hold Tube Hold for add-ons.     Comment: Auto resulted.       POC Glucose Once [[id number]]  (Abnormal) Collected: 02/09/21 1113    Specimen: Blood Updated: 02/09/21 1139     Glucose 252 mg/dL     Troponin [[id number]]  (Normal) Collected: 02/09/21 0741    Specimen: Blood Updated: 02/09/21 1038     Troponin T <0.010 ng/mL     Narrative:      Troponin T Reference Range:  <= 0.03 ng/mL-   Negative for AMI  >0.03 ng/mL-     Abnormal for myocardial necrosis.  Clinicians would have to utilize clinical acumen, EKG, Troponin and serial changes to determine if it is an Acute Myocardial Infarction or myocardial injury due to an underlying chronic condition.       Results may be falsely decreased if patient taking Biotin.      Hemoglobin A1c [[id number]]  (Abnormal) Collected: 02/09/21 0741    Specimen: Blood Updated: 02/09/21 1038     Hemoglobin A1C 10.60 %     Narrative:      Hemoglobin A1C Ranges:    Increased Risk for Diabetes  5.7% to 6.4%  Diabetes                     >= 6.5%  Diabetic Goal                < 7.0%    Phosphorus [[id number]]  (Abnormal) Collected: 02/09/21 0741     Specimen: Blood Updated: 02/09/21 1033     Phosphorus 7.0 mg/dL     Magnesium [[id number]]  (Normal) Collected: 02/09/21 0741    Specimen: Blood Updated: 02/09/21 1033     Magnesium 2.4 mg/dL     Blood Culture - Blood, Arm, Right [[id number]] Collected: 02/09/21 0922    Specimen: Blood from Arm, Right Updated: 02/09/21 0936    POC Glucose Once [[id number]]  (Abnormal) Collected: 02/09/21 0917    Specimen: Blood Updated: 02/09/21 0924     Glucose 294 mg/dL     COVID PRE-OP / PRE-PROCEDURE SCREENING ORDER (NO ISOLATION) - Swab, Nasal Cavity [[id number]]  (Normal) Collected: 02/09/21 0837    Specimen: Swab from Nasal Cavity Updated: 02/09/21 0924    Narrative:      The following orders were created for panel order COVID PRE-OP / PRE-PROCEDURE SCREENING ORDER (NO ISOLATION) - Swab, Nasal Cavity.  Procedure                               Abnormality         Status                     ---------                               -----------         ------                     COVID-19,[facility] [place]...[[id number]]  Normal              Final result                 Please view results for these tests on the individual orders.    COVID-19,[facility] IN-HOUSE,Nasal Swab No Transport Media 3-4 HR TAT - Swab, Nasal Cavity [[id number]]  (Normal) Collected: 02/09/21 0837    Specimen: Swab from Nasal Cavity Updated: 02/09/21 0924     COVID19 Not Detected    Narrative:      Fact sheet for providers: https://www.fda.gov/media/247830/download     Fact sheet for patients: https://www.fda.gov/media/307364/download    Test performed by PCR.    Consider negative results in combination with clinical observations, patient history, and epidemiological information.    Blood Gas, Venous - [[id number]]  (Abnormal) Collected: 02/09/21 0845    Specimen: Venous Blood Updated: 02/09/21 0857     Site OTHER     pH, Venous 7.015 pH Units      Comment: 85 Value below critical limit        pCO2, Venous 41.7 mm Hg      pO2, Venous 35.0 mm Hg      HCO3, Venous 10.6  mmol/L      Comment: 84 Value below reference range        Base Excess, Venous -20.2 mmol/L      Comment: 84 Value below reference range        O2 Saturation, Venous 54.1 %      Hemoglobin, Blood Gas 17.5 g/dL      Temperature 37.0 C      Barometric Pressure for Blood Gas 743 mmHg      Modality Room Air     Ventilator Mode NA     Comment: Meter: [id number]     :  [id number]        Notified Who RB AND ROSA RICH RN AT 0855     Notified By [id number]     Notified Time 02/09/2021 08:56    Urinalysis With Culture If Indicated - Urine, Clean Catch [[id number]]  (Abnormal) Collected: 02/09/21 0834    Specimen: Urine, Clean Catch Updated: 02/09/21 0856     Color, UA Yellow     Appearance, UA Clear     pH, UA <=5.0     Specific Gravity, UA 1.025     Glucose,  mg/dL (2+)     Ketones, UA >=160 mg/dL (4+)     Bilirubin, UA Negative     Blood, UA Negative     Protein, UA Trace     Leuk Esterase, UA Negative     Nitrite, UA Negative     Urobilinogen, UA 0.2 E.U./dL    Narrative:      Urine microscopic not indicated.    Blood Culture - Blood, Arm, Right [[id number]] Collected: 02/09/21 0820    Specimen: Blood from Arm, Right Updated: 02/09/21 0853    Lactic Acid, Plasma [[id number]]  (Abnormal) Collected: 02/09/21 0815    Specimen: Blood Updated: 02/09/21 0842     Lactate 3.3 mmol/L     Procalcitonin [[id number]]  (Normal) Collected: 02/09/21 0741    Specimen: Blood Updated: 02/09/21 0837     Procalcitonin 0.09 ng/mL     Narrative:      Results may be falsely decreased if patient taking Biotin.     Comprehensive Metabolic Panel [[id number]]  (Abnormal) Collected: 02/09/21 0741    Specimen: Blood Updated: 02/09/21 0807     Glucose 326 mg/dL      BUN 18 mg/dL      Creatinine 1.06 mg/dL      Sodium 138 mmol/L      Potassium 4.0 mmol/L      Chloride 96 mmol/L      CO2 11.4 mmol/L      Calcium 9.9 mg/dL      Total Protein 9.3 g/dL      Albumin 5.50 g/dL      ALT (SGPT) 38 U/L      AST (SGOT) 17 U/L      Alkaline  Phosphatase 160 U/L      Total Bilirubin 1.2 mg/dL      eGFR Non African Amer 57 mL/min/1.73      eGFR  African Amer 69 mL/min/1.73      Globulin 3.8 gm/dL      A/G Ratio 1.4 g/dL      BUN/Creatinine Ratio 17.0     Anion Gap 30.6 mmol/L     Narrative:      GFR Normal >60  Chronic Kidney Disease <60  Kidney Failure <15      hCG, Serum, Qualitative [[id number]]  (Normal) Collected: 02/09/21 0741    Specimen: Blood Updated: 02/09/21 0758     HCG Qualitative Negative    CBC & Differential [[id number]]  (Abnormal) Collected: 02/09/21 0741    Specimen: Blood Updated: 02/09/21 0749    Narrative:      The following orders were created for panel order CBC & Differential.  Procedure                               Abnormality         Status                     ---------                               -----------         ------                     CBC Auto Differential[[id number]]        Abnormal            Final result                 Please view results for these tests on the individual orders.    CBC Auto Differential [[id number]]  (Abnormal) Collected: 02/09/21 0741    Specimen: Blood Updated: 02/09/21 0749     WBC 18.06 10*3/mm3      RBC 6.05 10*6/mm3      Hemoglobin 18.5 g/dL      Hematocrit 53.2 %      MCV 87.9 fL      MCH 30.6 pg      MCHC 34.8 g/dL      RDW 12.6 %      RDW-SD 40.3 fl      MPV 11.7 fL      Platelets 330 10*3/mm3      Neutrophil % 89.2 %      Lymphocyte % 8.0 %      Monocyte % 1.7 %      Eosinophil % 0.1 %      Basophil % 0.2 %      Immature Grans % 0.8 %      Neutrophils, Absolute 16.12 10*3/mm3      Lymphocytes, Absolute 1.45 10*3/mm3      Monocytes, Absolute 0.31 10*3/mm3      Eosinophils, Absolute 0.01 10*3/mm3      Basophils, Absolute 0.03 10*3/mm3      Immature Grans, Absolute 0.14 10*3/mm3           Imaging Results (Most Recent)     Procedure Component Value Units Date/Time    XR Chest 1 View [[id number]] Collected: 02/09/21 0919     Updated: 02/09/21 0922    Narrative:      CHEST X-RAY, 02/09/2021          HISTORY:  45-year-old female in the ED complaining of 3 day history weakness,  nausea and mid chest pain.     TECHNIQUE:  AP portable upright chest x-ray.     FINDINGS:  The lungs are expanded and clear. No visible focal or multifocal  pulmonary infiltrate. No pleural effusion. Heart size and pulmonary  vascularity are normal.       Impression:      Negative chest.     This report was finalized on 2/9/2021 9:20 AM by Dr. Brandon Wyatt MD.           reviewed    ECG/EMG Results (most recent)     Procedure Component Value Units Date/Time    ECG 12 Lead [[id number]] Collected: 02/09/21 0744     Updated: 02/09/21 0844     QT Interval 324 ms     Narrative:      HEART RATE= 134  bpm  RR Interval= 448  ms  NM Interval= 120  ms  P Horizontal Axis= 29  deg  P Front Axis= 61  deg  QRSD Interval= 76  ms  QT Interval= 324  ms  QRS Axis= 15  deg  T Wave Axis= 16  deg  - OTHERWISE NORMAL ECG -  Sinus tachycardia  NO PRIOR TRACING AVAILABLE FOR COMPARISON  Electronically Signed By: Will Carmichael ([facility]) 09-Feb-2021 08:43:55  Date and Time of Study: 2021-02-09 07:44:45        Reviewed.  No prior tracing in our system or Care Everywhere    Assessment/Plan     1.  DKA: Etiology unclear, but certainly can be seen as a side effect to Jardiance.  However, she only took this dose so I doubt this as a cause.  UA unremarkable for infection.  CXR clear, but she is volume deplete.  Procalcitonin is low.  Troponin is negative.  She has been out of medication until Saturday, but it appears she has been on Trulicity and Metformin according to a clinic note from Dr. Currie in August 2020.  Will continue protocol, and I will speak to Dr. Delgado about medication changes since she may not be able to tolerate Synjardy.  Check UDS.    2.  Lactic Acidosis: I suspect this is due to volume contraction.  Not septic.  No indication for abx.      I discussed the patients findings and my recommendations with patient.     Enmanuel Mcdaniels MD  02/09/21  13:02  EST    Electronically signed by Enmanuel Mcdaniels MD at 21 1500          Emergency Department Notes      Luis Baker MD at 21 0741          Subjective     Vomiting  The primary symptoms include vomiting and diarrhea. Primary symptoms do not include fever. The illness began 3 to 5 days ago.   The emesis contains stomach contents.   The diarrhea is watery.       Review of Systems   Constitutional: Negative for fever.   Gastrointestinal: Positive for diarrhea and vomiting.   All other systems reviewed and are negative.      Past Medical History:   Diagnosis Date   • Diabetes mellitus (CMS/HCC)        Allergies   Allergen Reactions   • Morphine Shortness Of Breath   • Latex Dermatitis   • Penicillins Hives       Past Surgical History:   Procedure Laterality Date   • BREAST SURGERY     •  SECTION     • CHOLECYSTECTOMY     • SUBTOTAL HYSTERECTOMY         Family History   Problem Relation Age of Onset   • Diabetes Mother    • Hyperlipidemia Mother    • Hypertension Mother    • Brain cancer Father    • Heart disease Maternal Grandmother    • Hyperlipidemia Maternal Grandmother    • Diabetes Maternal Grandmother    • Hypertension Maternal Grandmother        Social History     Socioeconomic History   • Marital status: Single     Spouse name: Not on file   • Number of children: Not on file   • Years of education: Not on file   • Highest education level: Not on file   Tobacco Use   • Smoking status: Light Tobacco Smoker     Types: Cigarettes   • Smokeless tobacco: Never Used   Substance and Sexual Activity   • Alcohol use: Yes   • Drug use: Never   • Sexual activity: Defer           Objective   Physical Exam  Vitals signs and nursing note reviewed.   Constitutional:       General: She is not in acute distress.     Appearance: Normal appearance. She is not ill-appearing or diaphoretic.   HENT:      Head: Normocephalic and atraumatic.      Nose: Nose normal. No rhinorrhea.      Mouth/Throat:       Mouth: Mucous membranes are moist.   Eyes:      Extraocular Movements: Extraocular movements intact.      Conjunctiva/sclera: Conjunctivae normal.      Pupils: Pupils are equal, round, and reactive to light.   Neck:      Musculoskeletal: Normal range of motion and neck supple.   Cardiovascular:      Pulses: Normal pulses.      Heart sounds: No murmur.      Comments: tachy  Pulmonary:      Effort: Pulmonary effort is normal.      Breath sounds: Normal breath sounds. No rhonchi.   Abdominal:      General: Abdomen is flat. There is no distension.      Palpations: Abdomen is soft.      Tenderness: There is no abdominal tenderness. There is no guarding.   Musculoskeletal: Normal range of motion.         General: No tenderness.      Right lower leg: No edema.      Left lower leg: No edema.   Skin:     General: Skin is warm and dry.      Findings: No erythema or rash.   Neurological:      General: No focal deficit present.      Mental Status: She is alert and oriented to person, place, and time.      Cranial Nerves: No cranial nerve deficit.      Motor: No weakness.   Psychiatric:         Mood and Affect: Mood normal.         Behavior: Behavior normal.         Thought Content: Thought content normal.         Procedures          ED Course  ED Course as of Feb 09 0927   Tue Feb 09, 2021   0748 Elkg by me sinus 134, qrs nml, no st elev    [BC]   0917 Reeval, stable for admit    [BC]      ED Course User Index  [BC] Luis Baker MD         discussed with dr mar accepts admit                                  MDM  Number of Diagnoses or Management Options     Amount and/or Complexity of Data Reviewed  Clinical lab tests: ordered and reviewed  Tests in the radiology section of CPT®: ordered and reviewed  Discussion of test results with the performing providers: yes  Discuss the patient with other providers: yes    Risk of Complications, Morbidity, and/or Mortality  Presenting problems: high  Diagnostic procedures:  "high  Management options: high    Critical Care  Total time providing critical care: 30-74 minutes      Final diagnoses:   Diabetic ketoacidosis without coma associated with other specified diabetes mellitus (CMS/HCC)   SIRS (systemic inflammatory response syndrome) (CMS/[facility])            Luis Baker MD  02/09/21 0927      Electronically signed by Luis Baker MD at 02/09/21 0927     Almita Avelar RN at 02/09/21 0750        Pt repeatedly asks for ice water, I informed pt that she is here for 3 day n/v and is receiving Zofran and Reglan, and after administration of the meds if her n/v has improved we will try a fluid challenge     Valentino, Almita, RN  02/09/21 0752      Electronically signed by Almita Avelar RN at 02/09/21 0752     Almita Avelar RN at 02/09/21 0811        Pt states nausea improved     Almita Avelar RN  02/09/21 0811      Electronically signed by Almita Avelar RN at 02/09/21 0811          Physician Progress Notes (last 48 hours) (Notes from 02/08/21 1200 through 02/10/21 1200)      Enmanuel Mcdaniels MD at 02/10/21 1055          Hospitalist Team      Patient Care Team:  Tatyana Delgado MD as PCP - General (Internal Medicine & Pediatrics)        Chief Complaint:  Follow-up DKA    Subjective    Still feels weak, but improved from yesterday.  Tolerating ice chips.  Denies chest pain and dyspnea.  Able to get up to the bathroom w/o as much assistance as yesterday.    Objective    Vital Signs  Temp:  [98.2 °F (36.8 °C)-99.1 °F (37.3 °C)] 98.4 °F (36.9 °C)  Heart Rate:  [102-149] 102  Resp:  [17-22] 20  BP: ()/(58-79) 107/76  Oxygen Therapy  SpO2: 98 %  Pulse Oximetry Type: Continuous  Device (Oxygen Therapy): room air}    Flowsheet Rows      First Filed Value   Admission Height  152.4 cm (60\") Documented at 02/09/2021 0720   Admission Weight  59 kg (130 lb) Documented at 02/09/2021 0720        Physical Exam:    General: Appears stated age in NAD.  Lungs: Clear to auscultation " bilaterally.  Normal excursion.  CV: Regular rate and rhythm.  No murmurs appreciated.  Radial pulses are 2+ and symmetric.  Abdomen: Soft and non-tender w/ active bowel sounds.  MSK: No C/C/E.  Neuro: CN II-XII grossly intact.  Psych: Normal affect.  Oriented x3.    Results Review:     I reviewed the patient's new clinical results.    Lab Results (last 24 hours)     Procedure Component Value Units Date/Time    POC Glucose Once [[id number]]  (Abnormal) Collected: 02/10/21 1040    Specimen: Blood Updated: 02/10/21 1046     Glucose 173 mg/dL     Blood Gas, Venous - [[id number]]  (Abnormal) Collected: 02/10/21 0105    Specimen: Venous Blood Updated: 02/10/21 1020     Site Nurse/Dr Draw     pH, Venous 7.208 pH Units      pCO2, Venous 35.7 mm Hg      pO2, Venous 31.1 mm Hg      HCO3, Venous 14.2 mmol/L      Base Excess, Venous -12.8 mmol/L      O2 Saturation, Venous 62.0 %      Hemoglobin, Blood Gas 13.6 g/dL      Barometric Pressure for Blood Gas 745 mmHg      Modality Room Air     Notified Who Ramya RN     Notified By [id number]     Notified Time 02/10/2021 0500     Collected by nurse    POC Glucose Once [[id number]]  (Abnormal) Collected: 02/10/21 0942    Specimen: Blood Updated: 02/10/21 0948     Glucose 169 mg/dL     Blood Culture - Blood, Arm, Right [[id number]] Collected: 02/09/21 0922    Specimen: Blood from Arm, Right Updated: 02/10/21 0945     Blood Culture No growth at 24 hours    POC Glucose Once [[id number]]  (Abnormal) Collected: 02/10/21 0900    Specimen: Blood Updated: 02/10/21 0907     Glucose 201 mg/dL     Blood Culture - Blood, Arm, Right [[id number]] Collected: 02/09/21 0820    Specimen: Blood from Arm, Right Updated: 02/10/21 0901     Blood Culture No growth at 24 hours    POC Glucose Once [[id number]]  (Abnormal) Collected: 02/10/21 0733    Specimen: Blood Updated: 02/10/21 0743     Glucose 201 mg/dL     POC Glucose Once [[id number]]  (Abnormal) Collected: 02/10/21 0629    Specimen: Blood Updated: 02/10/21 0639       Glucose 223 mg/dL     Comprehensive Metabolic Panel [[id number]]  (Abnormal) Collected: 02/10/21 0533    Specimen: Blood from Arm, Right Updated: 02/10/21 0623     Glucose 219 mg/dL      BUN 5 mg/dL      Creatinine 0.64 mg/dL      Sodium 135 mmol/L      Potassium 4.5 mmol/L      Comment: Slight hemolysis detected by analyzer. Results may be affected.        Chloride 110 mmol/L      CO2 15.8 mmol/L      Calcium 7.2 mg/dL      Total Protein 5.7 g/dL      Albumin 3.40 g/dL      ALT (SGPT) 17 U/L      AST (SGOT) 15 U/L      Comment: Slight hemolysis detected by analyzer. Results may be affected.        Alkaline Phosphatase 84 U/L      Total Bilirubin 0.9 mg/dL      eGFR Non African Amer 101 mL/min/1.73      eGFR  African Amer 123 mL/min/1.73      Globulin 2.3 gm/dL      A/G Ratio 1.5 g/dL      BUN/Creatinine Ratio 7.8     Anion Gap 9.2 mmol/L     Narrative:      GFR Normal >60  Chronic Kidney Disease <60  Kidney Failure <15      Magnesium [[id number]]  (Normal) Collected: 02/10/21 0533    Specimen: Blood from Arm, Right Updated: 02/10/21 0623     Magnesium 2.3 mg/dL     Phosphorus [[id number]]  (Abnormal) Collected: 02/10/21 0533    Specimen: Blood from Arm, Right Updated: 02/10/21 0621     Phosphorus 2.4 mg/dL     CBC & Differential [[id number]]  (Abnormal) Collected: 02/10/21 0533    Specimen: Blood from Arm, Right Updated: 02/10/21 0603    Narrative:      The following orders were created for panel order CBC & Differential.  Procedure                               Abnormality         Status                     ---------                               -----------         ------                     CBC Auto Differential[[id number]]        Abnormal            Final result                 Please view results for these tests on the individual orders.    CBC Auto Differential [[id number]]  (Abnormal) Collected: 02/10/21 0533    Specimen: Blood from Arm, Right Updated: 02/10/21 0603     WBC 14.27 10*3/mm3      RBC 4.14  10*6/mm3      Hemoglobin 12.8 g/dL      Hematocrit 36.3 %      MCV 87.7 fL      MCH 30.9 pg      MCHC 35.3 g/dL      RDW 12.7 %      RDW-SD 40.7 fl      MPV 11.4 fL      Platelets 249 10*3/mm3      Neutrophil % 78.8 %      Lymphocyte % 13.3 %      Monocyte % 6.8 %      Eosinophil % 0.3 %      Basophil % 0.3 %      Immature Grans % 0.5 %      Neutrophils, Absolute 11.25 10*3/mm3      Lymphocytes, Absolute 1.90 10*3/mm3      Monocytes, Absolute 0.97 10*3/mm3      Eosinophils, Absolute 0.04 10*3/mm3      Basophils, Absolute 0.04 10*3/mm3      Immature Grans, Absolute 0.07 10*3/mm3      nRBC 0.0 /100 WBC     POC Glucose Once [[id number]]  (Abnormal) Collected: 02/10/21 0532    Specimen: Blood Updated: 02/10/21 0541     Glucose 214 mg/dL     POC Glucose Once [[id number]]  (Abnormal) Collected: 02/10/21 0435    Specimen: Blood Updated: 02/10/21 0450     Glucose 206 mg/dL     POC Glucose Once [[id number]]  (Abnormal) Collected: 02/10/21 0343    Specimen: Blood Updated: 02/10/21 0353     Glucose 223 mg/dL     POC Glucose Once [[id number]]  (Abnormal) Collected: 02/10/21 0235    Specimen: Blood Updated: 02/10/21 0243     Glucose 195 mg/dL     POC Glucose Once [[id number]]  (Abnormal) Collected: 02/10/21 0147    Specimen: Blood Updated: 02/10/21 0153     Glucose 175 mg/dL     Blood Gas, Arterial - [[id number]]  (Abnormal) Collected: 02/10/21 0105    Specimen: Arterial Blood Updated: 02/10/21 0107     Site Nurse/Dr Aiden De León's Test N/A     pH, Arterial 7.208 pH units      Comment: 85 Value below critical limit        pCO2, Arterial 35.7 mm Hg      Comment: 84 Value below reference range        pO2, Arterial 31.1 mm Hg      HCO3, Arterial 14.2 mmol/L      Comment: 84 Value below reference range        Base Excess, Arterial -12.8 mmol/L      Comment: 84 Value below reference range        O2 Saturation, Arterial 62.0 %      Hemoglobin, Blood Gas 13.6 g/dL      Temperature 37.0 C      Barometric Pressure for Blood Gas 745 mmHg       Modality Room Air     Ventilator Mode NA     Comment: Meter: [id number]     :  [id number]        Notified Who helena rn     Notified By [id number]     Notified Time 02/10/2021 01:08     pCO2, Temperature Corrected 35.7 mm Hg      pH, Temp Corrected 7.208 pH Units      pO2, Temperature Corrected 31.1 mm Hg     POC Glucose Once [[id number]]  (Abnormal) Collected: 02/10/21 0046    Specimen: Blood Updated: 02/10/21 0054     Glucose 187 mg/dL     Magnesium [[id number]]  (Abnormal) Collected: 02/09/21 2351    Specimen: Blood Updated: 02/10/21 0016     Magnesium 3.2 mg/dL     Phosphorus [[id number]]  (Normal) Collected: 02/09/21 2351    Specimen: Blood Updated: 02/10/21 0016     Phosphorus 2.7 mg/dL     Basic Metabolic Panel [[id number]]  (Abnormal) Collected: 02/09/21 2351    Specimen: Blood Updated: 02/10/21 0014     Glucose 178 mg/dL      BUN 7 mg/dL      Creatinine 0.65 mg/dL      Sodium 137 mmol/L      Potassium 4.0 mmol/L      Chloride 112 mmol/L      CO2 12.6 mmol/L      Calcium 7.6 mg/dL      eGFR  African Amer 121 mL/min/1.73      eGFR Non African Amer 99 mL/min/1.73      BUN/Creatinine Ratio 10.8     Anion Gap 12.4 mmol/L     Narrative:      GFR Normal >60  Chronic Kidney Disease <60  Kidney Failure <15      POC Glucose Once [[id number]]  (Abnormal) Collected: 02/09/21 2349    Specimen: Blood Updated: 02/09/21 2357     Glucose 167 mg/dL     POC Glucose Once [[id number]]  (Abnormal) Collected: 02/09/21 2244    Specimen: Blood Updated: 02/09/21 2251     Glucose 150 mg/dL     POC Glucose Once [[id number]]  (Normal) Collected: 02/09/21 2147    Specimen: Blood Updated: 02/09/21 2154     Glucose 124 mg/dL     POC Glucose Once [[id number]]  (Abnormal) Collected: 02/09/21 2048    Specimen: Blood Updated: 02/09/21 2104     Glucose 133 mg/dL     POC Glucose Once [[id number]]  (Abnormal) Collected: 02/09/21 1937    Specimen: Blood Updated: 02/09/21 1951     Glucose 132 mg/dL     Phosphorus [[id number]]  (Abnormal) Collected:  02/09/21 1816    Specimen: Blood Updated: 02/09/21 1849     Phosphorus 1.3 mg/dL     Basic Metabolic Panel [[id number]]  (Abnormal) Collected: 02/09/21 1816    Specimen: Blood Updated: 02/09/21 1843     Glucose 155 mg/dL      BUN 10 mg/dL      Creatinine 0.81 mg/dL      Sodium 137 mmol/L      Potassium 3.9 mmol/L      Chloride 109 mmol/L      CO2 15.6 mmol/L      Calcium 7.8 mg/dL      eGFR  African Amer 94 mL/min/1.73      eGFR Non African Amer 77 mL/min/1.73      BUN/Creatinine Ratio 12.3     Anion Gap 12.4 mmol/L     Narrative:      GFR Normal >60  Chronic Kidney Disease <60  Kidney Failure <15      Magnesium [[id number]]  (Normal) Collected: 02/09/21 1816    Specimen: Blood Updated: 02/09/21 1843     Magnesium 1.8 mg/dL     POC Glucose Once [[id number]]  (Abnormal) Collected: 02/09/21 1656    Specimen: Blood Updated: 02/09/21 1702     Glucose 145 mg/dL     POC Glucose Once [[id number]]  (Abnormal) Collected: 02/09/21 1601    Specimen: Blood Updated: 02/09/21 1608     Glucose 155 mg/dL     Troponin [[id number]]  (Normal) Collected: 02/09/21 1210    Specimen: Blood Updated: 02/09/21 1526     Troponin T <0.010 ng/mL     Narrative:      Troponin T Reference Range:  <= 0.03 ng/mL-   Negative for AMI  >0.03 ng/mL-     Abnormal for myocardial necrosis.  Clinicians would have to utilize clinical acumen, EKG, Troponin and serial changes to determine if it is an Acute Myocardial Infarction or myocardial injury due to an underlying chronic condition.       Results may be falsely decreased if patient taking Biotin.      POC Glucose Once [[id number]]  (Abnormal) Collected: 02/09/21 1507    Specimen: Blood Updated: 02/09/21 1513     Glucose 162 mg/dL     TSH [[id number]]  (Normal) Collected: 02/09/21 1210    Specimen: Blood Updated: 02/09/21 1508     TSH 0.516 uIU/mL     Urine Drug Screen - Urine, Clean Catch [[id number]]  (Normal) Collected: 02/09/21 0834    Specimen: Urine, Clean Catch Updated: 02/09/21 1500     THC, Screen, Urine  Negative     Phencyclidine (PCP), Urine Negative     Cocaine Screen, Urine Negative     Methamphetamine, Ur Negative     Opiate Screen Negative     Amphetamine Screen, Urine Negative     Benzodiazepine Screen, Urine Negative     Tricyclic Antidepressants Screen Negative     Methadone Screen, Urine Negative     Barbiturates Screen, Urine Negative     Oxycodone Screen, Urine Negative     Propoxyphene Screen Negative     Buprenorphine, Screen, Urine Negative    Narrative:      Urine drug screen results are to be used for medical purposes only.  They are not to be used for legal purposes such as employment testing.  Negative results do not necessarily mean the complete absence of a subtance, but rather that the result is less than the cutoff for that substance.  Positive results are unconfirmed and considered Preliminary Positive.  [facility] does not automatically confirm Postitive Unconfirmed results.  The physician may request (order) an Unconfirmed Positive result to be sent out for confirmation.      Negative Thresholds for Drugs Screened:    THC screen, urine                          50 ng/ml  Phenycyclidine (PCP), urine                25 ng/ml  Cocaine screen, urine                     150 ng/ml  Methamphetamine, urine                    500 ng/ml  Opiate screen, urine                      100 ng/ml  Amphetamine screen, urine                 500 ng/ml  Benzodiazepine screen, urine              150 ng/ml  Tricyclic Antidepressants screen, urine   300 ng/ml  Methadone screen, urine                   200 ng/ml  Barbiturates screen, urine                200 ng/ml  Oxycodone screen, urine                   100 ng/ml  Propoxyphene screen, urine                300 ng/ml  Buprenorphine screen, urine                10 ng/ml    POC Glucose Once [[id number]]  (Abnormal) Collected: 02/09/21 1405    Specimen: Blood Updated: 02/09/21 1412     Glucose 177 mg/dL     POC Glucose Once [[id number]]  (Abnormal)  Collected: 02/09/21 1308    Specimen: Blood Updated: 02/09/21 1315     Glucose 169 mg/dL     Phosphorus [[id number]]  (Normal) Collected: 02/09/21 1210    Specimen: Blood Updated: 02/09/21 1238     Phosphorus 2.8 mg/dL     Basic Metabolic Panel [[id number]]  (Abnormal) Collected: 02/09/21 1210    Specimen: Blood Updated: 02/09/21 1237     Glucose 195 mg/dL      BUN 14 mg/dL      Creatinine 0.82 mg/dL      Sodium 139 mmol/L      Potassium 4.5 mmol/L      Chloride 110 mmol/L      CO2 11.9 mmol/L      Calcium 7.7 mg/dL      eGFR  African Amer 92 mL/min/1.73      eGFR Non African Amer 76 mL/min/1.73      BUN/Creatinine Ratio 17.1     Anion Gap 17.1 mmol/L     Narrative:      GFR Normal >60  Chronic Kidney Disease <60  Kidney Failure <15      Lactic Acid, Reflex [[id number]]  (Abnormal) Collected: 02/09/21 1211    Specimen: Blood Updated: 02/09/21 1237     Lactate 2.1 mmol/L     Magnesium [[id number]]  (Normal) Collected: 02/09/21 1210    Specimen: Blood Updated: 02/09/21 1234     Magnesium 2.0 mg/dL     Lactic Acid, Reflex Timer (This will reflex a repeat order 3-3:15 hours after ordered.) [[id number]] Collected: 02/09/21 0815    Specimen: Blood Updated: 02/09/21 1146     Hold Tube Hold for add-ons.     Comment: Auto resulted.       POC Glucose Once [[id number]]  (Abnormal) Collected: 02/09/21 1113    Specimen: Blood Updated: 02/09/21 1139     Glucose 252 mg/dL           Imaging Results (Last 24 Hours)     ** No results found for the last 24 hours. **          Medication Review:   I have reviewed the patient's current medication list    Current Facility-Administered Medications:   •  acetaminophen (TYLENOL) tablet 650 mg, 650 mg, Oral, Q4H PRN, Shabnam Viera, APRN, 650 mg at 02/10/21 0011  •  dextrose (D50W) 25 g/ 50mL Intravenous Solution 12.5 g, 12.5 g, Intravenous, PRN, Enmanuel Mcdaniels MD  •  dextrose 5 % and sodium chloride 0.45 % infusion, 150 mL/hr, Intravenous, Continuous PRN, Enmanuel Mcdaniels MD  •  dextrose 5 %  and sodium chloride 0.45 % with KCl 20 mEq/L infusion, 150 mL/hr, Intravenous, Continuous PRN, Enmanuel Mcdaniels MD, Last Rate: 150 mL/hr at 02/10/21 1010, 150 mL/hr at 02/10/21 1010  •  dextrose 5 % and sodium chloride 0.45 % with KCl 40 mEq/L infusion, 150 mL/hr, Intravenous, Continuous PRN, Enmanuel Mcdaniels MD  •  dextrose 5 % and sodium chloride 0.9 % infusion, 150 mL/hr, Intravenous, Continuous PRN, Enmanuel Mcdaniels MD  •  dextrose 5 % and sodium chloride 0.9 % with KCl 20 mEq/L infusion, 150 mL/hr, Intravenous, Continuous PRN, Enmanuel Mcdaniels MD  •  dextrose 5 % and sodium chloride 0.9 % with KCl 40 mEq/L infusion, 150 mL/hr, Intravenous, Continuous PRN, Enmanuel Mcdaniels MD  •  enoxaparin (LOVENOX) syringe 40 mg, 40 mg, Subcutaneous, Q24H, Enmanuel Mcdaniels MD, 40 mg at 02/09/21 1256  •  insulin regular 1 unit/mL in 0.9% sodium chloride, 5 Units/hr, Intravenous, Titrated, Enmanuel Mcdaniels MD, Last Rate: 2.5 mL/hr at 02/10/21 1041, 2.5 Units/hr at 02/10/21 1041  •  Magnesium Sulfate 2 gram Bolus, followed by 8 gram infusion (total Mg dose 10 grams)- Mg less than or equal to 1mg/dL, 2 g, Intravenous, PRN **OR** Magnesium Sulfate 2 gram / 50mL Infusion (GIVE X 3 BAGS TO EQUAL 6GM TOTAL DOSE) - Mg 1.1 - 1.5 mg/dl, 2 g, Intravenous, PRN **OR** Magnesium Sulfate 4 gram infusion- Mg 1.6-1.9 mg/dL, 4 g, Intravenous, PRN, Enmanuel Mcdaniels MD, Stopped at 02/10/21 0042  •  ondansetron (ZOFRAN) injection 4 mg, 4 mg, Intravenous, Q4H PRN, Shabnam Viera, APRN, 4 mg at 02/10/21 0011  •  potassium chloride 10 mEq in 100 mL IVPB, 10 mEq, Intravenous, Once, Enmanuel Mcdaniels MD  •  potassium phosphate 45 mmol in sodium chloride 0.9 % 500 mL infusion, 45 mmol, Intravenous, PRN **OR** potassium phosphate 30 mmol in sodium chloride 0.9 % 250 mL infusion, 30 mmol, Intravenous, PRN, Stopped at 02/10/21 0529 **OR** potassium phosphate 15 mmol in sodium chloride 0.9 % 100 mL infusion, 15 mmol, Intravenous, PRN **OR** sodium phosphates 45  mmol in sodium chloride 0.9 % 500 mL IVPB, 45 mmol, Intravenous, PRN **OR** sodium phosphates 30 mmol in sodium chloride 0.9 % 250 mL IVPB, 30 mmol, Intravenous, PRN **OR** sodium phosphates 15 mmol in sodium chloride 0.9 % 250 mL IVPB, 15 mmol, Intravenous, PRN, Enmanuel Mcdaniels MD, Last Rate: 62.5 mL/hr at 02/10/21 0907, 15 mmol at 02/10/21 0907  •  sodium chloride 0.45 % 1,000 mL with potassium chloride 40 mEq infusion, 250 mL/hr, Intravenous, Continuous PRNArslan Rusty T, MD  •  sodium chloride 0.45 % infusion, 250 mL/hr, Intravenous, Continuous PRArslan MANEUL Rusty T, MD  •  sodium chloride 0.45 % with KCl 20 mEq/L infusion, 250 mL/hr, Intravenous, Continuous PRMAR, Enmanuel Mcdaniels MD  •  sodium chloride 0.9 % bolus 2,000 mL, 2,000 mL, Intravenous, Once, Enmanuel Mcdaniels MD  •  sodium chloride 0.9 % flush 10 mL, 10 mL, Intravenous, PRNArslan Rusty T, MD  •  sodium chloride 0.9 % flush 10 mL, 10 mL, Intravenous, Q12H, Enmanuel Mcdaniels MD, 10 mL at 02/10/21 0914  •  sodium chloride 0.9 % flush 10 mL, 10 mL, Intravenous, Once Arslan ESTRADA Rusty T, MD  •  sodium chloride 0.9 % flush 3 mL, 3 mL, Intravenous, Q12H, Enmanuel Mcdaniels MD, 3 mL at 02/10/21 0915  •  sodium chloride 0.9 % flush 3-10 mL, 3-10 mL, Intravenous, Arslan ESTRADA Rusty T, MD  •  sodium chloride 0.9 % infusion 40 mL, 40 mL, Intravenous, Arslan ESTRADA Rusty T, MD  •  sodium chloride 0.9 % infusion 40 mL, 40 mL, Intravenous, Arslan ESTRADA Rusty T, MD  •  sodium chloride 0.9 % infusion, 250 mL/hr, Intravenous, Continuous Arslan ESTRADA Rusty T, MD  •  sodium chloride 0.9 % infusion, 10 mL/hr, Intravenous, Continuous PRN, Enmanuel Mcdaniels MD  •  sodium chloride 0.9 % with KCl 20 mEq/L infusion, 250 mL/hr, Intravenous, Continuous PRN, Enmanuel Mcdaniels MD  •  sodium chloride 0.9 % with KCl 40 mEq/L infusion, 250 mL/hr, Intravenous, Continuous PRMAR, Enmanuel Mcdaniels MD      Assessment/Plan     1.  DKA: Gap has closed, but acidosis persists although pH has improved from  admission.  Continue drip.  Tachycardia resolving w/ fluid resuscitation as did Lactic acidosis.  UDS negative.  Leukocytosis also resolving w/o abx intervention.  Repeat procalcitonin.  Will speak to Dr. Delgado today about medication strategy.  Elytes by protocol.  AMI excluded by serial biomarkers.    Plan for disposition: Home when able    Enmanuel Mcdaniels MD  02/10/21  10:55 EST    Electronically signed by Enmanuel Mcdaniels MD at 02/10/21 6485

## 2021-02-10 NOTE — NURSING NOTE
Discharge Planning Assessment   Danielle Mccord     Patient Name: Yovanny Solitario  MRN: [id number]  Today's Date: 2/10/2021    Admit Date: 2/9/2021    Discharge Needs Assessment     Row Name 02/10/21 1030       Living Environment    Lives With  sibling(s)    Name(s) of Who Lives With Patient  sister Flores    Current Living Arrangements  home/apartment/condo    Duration at Residence  9 months    Potentially Unsafe Housing Conditions  -- none    Primary Care Provided by  self    Provides Primary Care For  no one    Family Caregiver if Needed  sibling(s)    Family Caregiver Names  sister Flores    Quality of Family Relationships  unable to assess    Able to Return to Prior Arrangements  yes       Resource/Environmental Concerns    Resource/Environmental Concerns  none    Transportation Concerns  -- none       Transition Planning    Patient/Family Anticipates Transition to  home    Patient/Family Anticipated Services at Transition  none    Transportation Anticipated  family or friend will provide       Discharge Needs Assessment    Readmission Within the Last 30 Days  no previous admission in last 30 days    Current Outpatient/Agency/Support Group  -- none    Equipment Currently Used at Home  glucometer    Concerns to be Addressed  no discharge needs identified    Anticipated Changes Related to Illness  none    Equipment Needed After Discharge  none    Outpatient/Agency/Support Group Needs  -- none    Discharge Facility/Level of Care Needs  -- none    Provided Post Acute Provider List?  Refused    Refused Provider List Comment  Pt declined    Provided Post Acute Provider Quality & Resource List?  Refused    Refused Quality and Resource List Comment  Pt declined    Current Discharge Risk  -- none identified        Discharge Plan     Row Name 02/10/21 1032       Plan    Plan  Discharge home    Patient/Family in Agreement with Plan  yes    Plan Comments  I spoke with the patient at bedside with her permission.  I  introduced myself and explained my role.  I verified the information on the facesheet and her PCP as Dr. Delgado.  She advised that she uses [facility] in [place] as her pharmacy and she is able to obtain and afford her medications.  She denied having a living will and requested paperwork for same.  She stated that she lives in a single story home for the past 9 months with her sister.  She stated that there are 2 steps to enter the home and she is able to gain entry to and maneuver around her home with no issues.  She further advised that she is independent with her ADL's and has a car and drives.  She stated that her sister will pick her up at discharge.  She has a glucometer at home and doesn't feel that she will need any other DME, home health or other community resources at discharge.  She will discharge home with her sister and is agreeable to that plan.  CM will continue to follow.        Continued Care and Services - Admitted Since 2/9/2021    Coordination has not been started for this encounter.         Demographic Summary     Row Name 02/10/21 1030       General Information    Admission Type  observation    Arrived From  home    Referral Source  admission list    Reason for Consult  discharge planning    Preferred Language  English     Used During This Interaction  no       Contact Information    Permission Granted to Share Info With          Functional Status    No documentation.       Psychosocial    No documentation.       Abuse/Neglect    No documentation.       Legal    No documentation.       Substance Abuse    No documentation.       Patient Forms    No documentation.           Mariel Lin RN

## 2021-02-10 NOTE — PROGRESS NOTES
"Hospitalist Team      Patient Care Team:  Tatyana Delgado MD as PCP - General (Internal Medicine & Pediatrics)        Chief Complaint:  Follow-up DKA    Subjective    Still feels weak, but improved from yesterday.  Tolerating ice chips.  Denies chest pain and dyspnea.  Able to get up to the bathroom w/o as much assistance as yesterday.    Objective    Vital Signs  Temp:  [98.2 °F (36.8 °C)-99.1 °F (37.3 °C)] 98.4 °F (36.9 °C)  Heart Rate:  [102-149] 102  Resp:  [17-22] 20  BP: ()/(58-79) 107/76  Oxygen Therapy  SpO2: 98 %  Pulse Oximetry Type: Continuous  Device (Oxygen Therapy): room air}    Flowsheet Rows      First Filed Value   Admission Height  152.4 cm (60\") Documented at 02/09/2021 0720   Admission Weight  59 kg (130 lb) Documented at 02/09/2021 0720        Physical Exam:    General: Appears stated age in NAD.  Lungs: Clear to auscultation bilaterally.  Normal excursion.  CV: Regular rate and rhythm.  No murmurs appreciated.  Radial pulses are 2+ and symmetric.  Abdomen: Soft and non-tender w/ active bowel sounds.  MSK: No C/C/E.  Neuro: CN II-XII grossly intact.  Psych: Normal affect.  Oriented x3.    Results Review:     I reviewed the patient's new clinical results.    Lab Results (last 24 hours)     Procedure Component Value Units Date/Time    POC Glucose Once [[id number]]  (Abnormal) Collected: 02/10/21 1040    Specimen: Blood Updated: 02/10/21 1046     Glucose 173 mg/dL     Blood Gas, Venous - [[id number]]  (Abnormal) Collected: 02/10/21 0105    Specimen: Venous Blood Updated: 02/10/21 1020     Site Nurse/Dr Draw     pH, Venous 7.208 pH Units      pCO2, Venous 35.7 mm Hg      pO2, Venous 31.1 mm Hg      HCO3, Venous 14.2 mmol/L      Base Excess, Venous -12.8 mmol/L      O2 Saturation, Venous 62.0 %      Hemoglobin, Blood Gas 13.6 g/dL      Barometric Pressure for Blood Gas 745 mmHg      Modality Room Air     Notified Who Ramya ROMERO     Notified By [id number]     Notified Time 02/10/2021 0500     " Collected by nurse    POC Glucose Once [[id number]]  (Abnormal) Collected: 02/10/21 0942    Specimen: Blood Updated: 02/10/21 0948     Glucose 169 mg/dL     Blood Culture - Blood, Arm, Right [[id number]] Collected: 02/09/21 0922    Specimen: Blood from Arm, Right Updated: 02/10/21 0945     Blood Culture No growth at 24 hours    POC Glucose Once [[id number]]  (Abnormal) Collected: 02/10/21 0900    Specimen: Blood Updated: 02/10/21 0907     Glucose 201 mg/dL     Blood Culture - Blood, Arm, Right [[id number]] Collected: 02/09/21 0820    Specimen: Blood from Arm, Right Updated: 02/10/21 0901     Blood Culture No growth at 24 hours    POC Glucose Once [[id number]]  (Abnormal) Collected: 02/10/21 0733    Specimen: Blood Updated: 02/10/21 0743     Glucose 201 mg/dL     POC Glucose Once [[id number]]  (Abnormal) Collected: 02/10/21 0629    Specimen: Blood Updated: 02/10/21 0639     Glucose 223 mg/dL     Comprehensive Metabolic Panel [[id number]]  (Abnormal) Collected: 02/10/21 0533    Specimen: Blood from Arm, Right Updated: 02/10/21 0623     Glucose 219 mg/dL      BUN 5 mg/dL      Creatinine 0.64 mg/dL      Sodium 135 mmol/L      Potassium 4.5 mmol/L      Comment: Slight hemolysis detected by analyzer. Results may be affected.        Chloride 110 mmol/L      CO2 15.8 mmol/L      Calcium 7.2 mg/dL      Total Protein 5.7 g/dL      Albumin 3.40 g/dL      ALT (SGPT) 17 U/L      AST (SGOT) 15 U/L      Comment: Slight hemolysis detected by analyzer. Results may be affected.        Alkaline Phosphatase 84 U/L      Total Bilirubin 0.9 mg/dL      eGFR Non African Amer 101 mL/min/1.73      eGFR  African Amer 123 mL/min/1.73      Globulin 2.3 gm/dL      A/G Ratio 1.5 g/dL      BUN/Creatinine Ratio 7.8     Anion Gap 9.2 mmol/L     Narrative:      GFR Normal >60  Chronic Kidney Disease <60  Kidney Failure <15      Magnesium [[id number]]  (Normal) Collected: 02/10/21 0533    Specimen: Blood from Arm, Right Updated: 02/10/21 0623     Magnesium  2.3 mg/dL     Phosphorus [[id number]]  (Abnormal) Collected: 02/10/21 0533    Specimen: Blood from Arm, Right Updated: 02/10/21 0621     Phosphorus 2.4 mg/dL     CBC & Differential [[id number]]  (Abnormal) Collected: 02/10/21 0533    Specimen: Blood from Arm, Right Updated: 02/10/21 0603    Narrative:      The following orders were created for panel order CBC & Differential.  Procedure                               Abnormality         Status                     ---------                               -----------         ------                     CBC Auto Differential[[id number]]        Abnormal            Final result                 Please view results for these tests on the individual orders.    CBC Auto Differential [[id number]]  (Abnormal) Collected: 02/10/21 0533    Specimen: Blood from Arm, Right Updated: 02/10/21 0603     WBC 14.27 10*3/mm3      RBC 4.14 10*6/mm3      Hemoglobin 12.8 g/dL      Hematocrit 36.3 %      MCV 87.7 fL      MCH 30.9 pg      MCHC 35.3 g/dL      RDW 12.7 %      RDW-SD 40.7 fl      MPV 11.4 fL      Platelets 249 10*3/mm3      Neutrophil % 78.8 %      Lymphocyte % 13.3 %      Monocyte % 6.8 %      Eosinophil % 0.3 %      Basophil % 0.3 %      Immature Grans % 0.5 %      Neutrophils, Absolute 11.25 10*3/mm3      Lymphocytes, Absolute 1.90 10*3/mm3      Monocytes, Absolute 0.97 10*3/mm3      Eosinophils, Absolute 0.04 10*3/mm3      Basophils, Absolute 0.04 10*3/mm3      Immature Grans, Absolute 0.07 10*3/mm3      nRBC 0.0 /100 WBC     POC Glucose Once [[id number]]  (Abnormal) Collected: 02/10/21 0532    Specimen: Blood Updated: 02/10/21 0541     Glucose 214 mg/dL     POC Glucose Once [[id number]]  (Abnormal) Collected: 02/10/21 0435    Specimen: Blood Updated: 02/10/21 0450     Glucose 206 mg/dL     POC Glucose Once [[id number]]  (Abnormal) Collected: 02/10/21 0343    Specimen: Blood Updated: 02/10/21 0353     Glucose 223 mg/dL     POC Glucose Once [[id number]]  (Abnormal) Collected: 02/10/21  0235    Specimen: Blood Updated: 02/10/21 0243     Glucose 195 mg/dL     POC Glucose Once [[id number]]  (Abnormal) Collected: 02/10/21 0147    Specimen: Blood Updated: 02/10/21 0153     Glucose 175 mg/dL     Blood Gas, Arterial - [[id number]]  (Abnormal) Collected: 02/10/21 0105    Specimen: Arterial Blood Updated: 02/10/21 0107     Site Nurse/Dr Wolfe     Sarthak's Test N/A     pH, Arterial 7.208 pH units      Comment: 85 Value below critical limit        pCO2, Arterial 35.7 mm Hg      Comment: 84 Value below reference range        pO2, Arterial 31.1 mm Hg      HCO3, Arterial 14.2 mmol/L      Comment: 84 Value below reference range        Base Excess, Arterial -12.8 mmol/L      Comment: 84 Value below reference range        O2 Saturation, Arterial 62.0 %      Hemoglobin, Blood Gas 13.6 g/dL      Temperature 37.0 C      Barometric Pressure for Blood Gas 745 mmHg      Modality Room Air     Ventilator Mode NA     Comment: Meter: [id number]     :  [id number]        Notified [facility] allkerry rn     Notified By [id number]     Notified Time 02/10/2021 01:08     pCO2, Temperature Corrected 35.7 mm Hg      pH, Temp Corrected 7.208 pH Units      pO2, Temperature Corrected 31.1 mm Hg     POC Glucose Once [[id number]]  (Abnormal) Collected: 02/10/21 0046    Specimen: Blood Updated: 02/10/21 0054     Glucose 187 mg/dL     Magnesium [[id number]]  (Abnormal) Collected: 02/09/21 2351    Specimen: Blood Updated: 02/10/21 0016     Magnesium 3.2 mg/dL     Phosphorus [[id number]]  (Normal) Collected: 02/09/21 2351    Specimen: Blood Updated: 02/10/21 0016     Phosphorus 2.7 mg/dL     Basic Metabolic Panel [[id number]]  (Abnormal) Collected: 02/09/21 2351    Specimen: Blood Updated: 02/10/21 0014     Glucose 178 mg/dL      BUN 7 mg/dL      Creatinine 0.65 mg/dL      Sodium 137 mmol/L      Potassium 4.0 mmol/L      Chloride 112 mmol/L      CO2 12.6 mmol/L      Calcium 7.6 mg/dL      eGFR  African Amer 121 mL/min/1.73      eGFR Non African Amer  99 mL/min/1.73      BUN/Creatinine Ratio 10.8     Anion Gap 12.4 mmol/L     Narrative:      GFR Normal >60  Chronic Kidney Disease <60  Kidney Failure <15      POC Glucose Once [[id number]]  (Abnormal) Collected: 02/09/21 2349    Specimen: Blood Updated: 02/09/21 2357     Glucose 167 mg/dL     POC Glucose Once [[id number]]  (Abnormal) Collected: 02/09/21 2244    Specimen: Blood Updated: 02/09/21 2251     Glucose 150 mg/dL     POC Glucose Once [[id number]]  (Normal) Collected: 02/09/21 2147    Specimen: Blood Updated: 02/09/21 2154     Glucose 124 mg/dL     POC Glucose Once [[id number]]  (Abnormal) Collected: 02/09/21 2048    Specimen: Blood Updated: 02/09/21 2104     Glucose 133 mg/dL     POC Glucose Once [[id number]]  (Abnormal) Collected: 02/09/21 1937    Specimen: Blood Updated: 02/09/21 1951     Glucose 132 mg/dL     Phosphorus [[id number]]  (Abnormal) Collected: 02/09/21 1816    Specimen: Blood Updated: 02/09/21 1849     Phosphorus 1.3 mg/dL     Basic Metabolic Panel [[id number]]  (Abnormal) Collected: 02/09/21 1816    Specimen: Blood Updated: 02/09/21 1843     Glucose 155 mg/dL      BUN 10 mg/dL      Creatinine 0.81 mg/dL      Sodium 137 mmol/L      Potassium 3.9 mmol/L      Chloride 109 mmol/L      CO2 15.6 mmol/L      Calcium 7.8 mg/dL      eGFR  African Amer 94 mL/min/1.73      eGFR Non African Amer 77 mL/min/1.73      BUN/Creatinine Ratio 12.3     Anion Gap 12.4 mmol/L     Narrative:      GFR Normal >60  Chronic Kidney Disease <60  Kidney Failure <15      Magnesium [[id number]]  (Normal) Collected: 02/09/21 1816    Specimen: Blood Updated: 02/09/21 1843     Magnesium 1.8 mg/dL     POC Glucose Once [[id number]]  (Abnormal) Collected: 02/09/21 1656    Specimen: Blood Updated: 02/09/21 1702     Glucose 145 mg/dL     POC Glucose Once [[id number]]  (Abnormal) Collected: 02/09/21 1601    Specimen: Blood Updated: 02/09/21 1608     Glucose 155 mg/dL     Troponin [[id number]]  (Normal) Collected: 02/09/21 1210    Specimen:  Blood Updated: 02/09/21 1526     Troponin T <0.010 ng/mL     Narrative:      Troponin T Reference Range:  <= 0.03 ng/mL-   Negative for AMI  >0.03 ng/mL-     Abnormal for myocardial necrosis.  Clinicians would have to utilize clinical acumen, EKG, Troponin and serial changes to determine if it is an Acute Myocardial Infarction or myocardial injury due to an underlying chronic condition.       Results may be falsely decreased if patient taking Biotin.      POC Glucose Once [[id number]]  (Abnormal) Collected: 02/09/21 1507    Specimen: Blood Updated: 02/09/21 1513     Glucose 162 mg/dL     TSH [[id number]]  (Normal) Collected: 02/09/21 1210    Specimen: Blood Updated: 02/09/21 1508     TSH 0.516 uIU/mL     Urine Drug Screen - Urine, Clean Catch [[id number]]  (Normal) Collected: 02/09/21 0834    Specimen: Urine, Clean Catch Updated: 02/09/21 1500     THC, Screen, Urine Negative     Phencyclidine (PCP), Urine Negative     Cocaine Screen, Urine Negative     Methamphetamine, Ur Negative     Opiate Screen Negative     Amphetamine Screen, Urine Negative     Benzodiazepine Screen, Urine Negative     Tricyclic Antidepressants Screen Negative     Methadone Screen, Urine Negative     Barbiturates Screen, Urine Negative     Oxycodone Screen, Urine Negative     Propoxyphene Screen Negative     Buprenorphine, Screen, Urine Negative    Narrative:      Urine drug screen results are to be used for medical purposes only.  They are not to be used for legal purposes such as employment testing.  Negative results do not necessarily mean the complete absence of a subtance, but rather that the result is less than the cutoff for that substance.  Positive results are unconfirmed and considered Preliminary Positive.  [facility] does not automatically confirm Postitive Unconfirmed results.  The physician may request (order) an Unconfirmed Positive result to be sent out for confirmation.      Negative Thresholds for Drugs  Screened:    THC screen, urine                          50 ng/ml  Phenycyclidine (PCP), urine                25 ng/ml  Cocaine screen, urine                     150 ng/ml  Methamphetamine, urine                    500 ng/ml  Opiate screen, urine                      100 ng/ml  Amphetamine screen, urine                 500 ng/ml  Benzodiazepine screen, urine              150 ng/ml  Tricyclic Antidepressants screen, urine   300 ng/ml  Methadone screen, urine                   200 ng/ml  Barbiturates screen, urine                200 ng/ml  Oxycodone screen, urine                   100 ng/ml  Propoxyphene screen, urine                300 ng/ml  Buprenorphine screen, urine                10 ng/ml    POC Glucose Once [[id number]]  (Abnormal) Collected: 02/09/21 1405    Specimen: Blood Updated: 02/09/21 1412     Glucose 177 mg/dL     POC Glucose Once [[id number]]  (Abnormal) Collected: 02/09/21 1308    Specimen: Blood Updated: 02/09/21 1315     Glucose 169 mg/dL     Phosphorus [[id number]]  (Normal) Collected: 02/09/21 1210    Specimen: Blood Updated: 02/09/21 1238     Phosphorus 2.8 mg/dL     Basic Metabolic Panel [[id number]]  (Abnormal) Collected: 02/09/21 1210    Specimen: Blood Updated: 02/09/21 1237     Glucose 195 mg/dL      BUN 14 mg/dL      Creatinine 0.82 mg/dL      Sodium 139 mmol/L      Potassium 4.5 mmol/L      Chloride 110 mmol/L      CO2 11.9 mmol/L      Calcium 7.7 mg/dL      eGFR  African Amer 92 mL/min/1.73      eGFR Non African Amer 76 mL/min/1.73      BUN/Creatinine Ratio 17.1     Anion Gap 17.1 mmol/L     Narrative:      GFR Normal >60  Chronic Kidney Disease <60  Kidney Failure <15      Lactic Acid, Reflex [[id number]]  (Abnormal) Collected: 02/09/21 1211    Specimen: Blood Updated: 02/09/21 1237     Lactate 2.1 mmol/L     Magnesium [[id number]]  (Normal) Collected: 02/09/21 1210    Specimen: Blood Updated: 02/09/21 1234     Magnesium 2.0 mg/dL     Lactic Acid, Reflex Timer (This will reflex a repeat order  3-3:15 hours after ordered.) [[id number]] Collected: 02/09/21 0815    Specimen: Blood Updated: 02/09/21 1146     Hold Tube Hold for add-ons.     Comment: Auto resulted.       POC Glucose Once [[id number]]  (Abnormal) Collected: 02/09/21 1113    Specimen: Blood Updated: 02/09/21 1139     Glucose 252 mg/dL           Imaging Results (Last 24 Hours)     ** No results found for the last 24 hours. **          Medication Review:   I have reviewed the patient's current medication list    Current Facility-Administered Medications:   •  acetaminophen (TYLENOL) tablet 650 mg, 650 mg, Oral, Q4H PRN, Shabnam Viera R, APRN, 650 mg at 02/10/21 0011  •  dextrose (D50W) 25 g/ 50mL Intravenous Solution 12.5 g, 12.5 g, Intravenous, PRN, Enmanuel Mcdaniels MD  •  dextrose 5 % and sodium chloride 0.45 % infusion, 150 mL/hr, Intravenous, Continuous PRN, Enmanuel Mcdaniels MD  •  dextrose 5 % and sodium chloride 0.45 % with KCl 20 mEq/L infusion, 150 mL/hr, Intravenous, Continuous PRN, Enmanuel Mcdaniels MD, Last Rate: 150 mL/hr at 02/10/21 1010, 150 mL/hr at 02/10/21 1010  •  dextrose 5 % and sodium chloride 0.45 % with KCl 40 mEq/L infusion, 150 mL/hr, Intravenous, Continuous PRN, Enmanuel Mcdaniels MD  •  dextrose 5 % and sodium chloride 0.9 % infusion, 150 mL/hr, Intravenous, Continuous PRN, Enmanuel Mcdaniels MD  •  dextrose 5 % and sodium chloride 0.9 % with KCl 20 mEq/L infusion, 150 mL/hr, Intravenous, Continuous PRN, Enmanuel Mcdaniels MD  •  dextrose 5 % and sodium chloride 0.9 % with KCl 40 mEq/L infusion, 150 mL/hr, Intravenous, Continuous SEAN, Enmanuel Mcdaniels MD  •  enoxaparin (LOVENOX) syringe 40 mg, 40 mg, Subcutaneous, Q24H, Enmanuel Mcdaniels MD, 40 mg at 02/09/21 1256  •  insulin regular 1 unit/mL in 0.9% sodium chloride, 5 Units/hr, Intravenous, Titrated, Enmanuel Mcdaniels MD, Last Rate: 2.5 mL/hr at 02/10/21 1041, 2.5 Units/hr at 02/10/21 1041  •  Magnesium Sulfate 2 gram Bolus, followed by 8 gram infusion (total Mg dose 10 grams)- Mg less  than or equal to 1mg/dL, 2 g, Intravenous, PRN **OR** Magnesium Sulfate 2 gram / 50mL Infusion (GIVE X 3 BAGS TO EQUAL 6GM TOTAL DOSE) - Mg 1.1 - 1.5 mg/dl, 2 g, Intravenous, PRN **OR** Magnesium Sulfate 4 gram infusion- Mg 1.6-1.9 mg/dL, 4 g, Intravenous, PRN, Enmanuel Mcdaniels MD, Stopped at 02/10/21 0042  •  ondansetron (ZOFRAN) injection 4 mg, 4 mg, Intravenous, Q4H PRN, Shabnam Viera, APRN, 4 mg at 02/10/21 0011  •  potassium chloride 10 mEq in 100 mL IVPB, 10 mEq, Intravenous, Once, Enmanuel Mcdaniels MD  •  potassium phosphate 45 mmol in sodium chloride 0.9 % 500 mL infusion, 45 mmol, Intravenous, PRN **OR** potassium phosphate 30 mmol in sodium chloride 0.9 % 250 mL infusion, 30 mmol, Intravenous, PRN, Stopped at 02/10/21 0529 **OR** potassium phosphate 15 mmol in sodium chloride 0.9 % 100 mL infusion, 15 mmol, Intravenous, PRN **OR** sodium phosphates 45 mmol in sodium chloride 0.9 % 500 mL IVPB, 45 mmol, Intravenous, PRN **OR** sodium phosphates 30 mmol in sodium chloride 0.9 % 250 mL IVPB, 30 mmol, Intravenous, PRN **OR** sodium phosphates 15 mmol in sodium chloride 0.9 % 250 mL IVPB, 15 mmol, Intravenous, PRN, Enmanuel Mcdaniels MD, Last Rate: 62.5 mL/hr at 02/10/21 0907, 15 mmol at 02/10/21 0907  •  sodium chloride 0.45 % 1,000 mL with potassium chloride 40 mEq infusion, 250 mL/hr, Intravenous, Continuous PRN, Enmanuel Mcdaniels MD  •  sodium chloride 0.45 % infusion, 250 mL/hr, Intravenous, Continuous PRN, Enmanuel Mcdaniels MD  •  sodium chloride 0.45 % with KCl 20 mEq/L infusion, 250 mL/hr, Intravenous, Continuous PRNArslan Rusty T, MD  •  sodium chloride 0.9 % bolus 2,000 mL, 2,000 mL, Intravenous, Once, Enmanuel Mcdaniels MD  •  sodium chloride 0.9 % flush 10 mL, 10 mL, Intravenous, PRN, Enmanuel Mcdaniels MD  •  sodium chloride 0.9 % flush 10 mL, 10 mL, Intravenous, Q12H, Enmanuel Mcdaniels MD, 10 mL at 02/10/21 0914  •  sodium chloride 0.9 % flush 10 mL, 10 mL, Intravenous, Once PRN, Enmanuel Mcdaniels MD  •   sodium chloride 0.9 % flush 3 mL, 3 mL, Intravenous, Q12H, Enmanuel Mcdaniels MD, 3 mL at 02/10/21 0915  •  sodium chloride 0.9 % flush 3-10 mL, 3-10 mL, Intravenous, PRN, Enmanuel Mcdaniels MD  •  sodium chloride 0.9 % infusion 40 mL, 40 mL, Intravenous, Arslan ESTRADA Rusty T, MD  •  sodium chloride 0.9 % infusion 40 mL, 40 mL, Intravenous, PRN, Enmanuel Mcdaniels MD  •  sodium chloride 0.9 % infusion, 250 mL/hr, Intravenous, Continuous PRArslan MANUEL Rusty T, MD  •  sodium chloride 0.9 % infusion, 10 mL/hr, Intravenous, Continuous PRMAR, Enmanuel Mcdaniels MD  •  sodium chloride 0.9 % with KCl 20 mEq/L infusion, 250 mL/hr, Intravenous, Continuous Arslan ESTRADA Rusty T, MD  •  sodium chloride 0.9 % with KCl 40 mEq/L infusion, 250 mL/hr, Intravenous, Continuous Arslan ESTRADA Rusty T, MD      Assessment/Plan     1.  DKA: Gap has closed, but acidosis persists although pH has improved from admission.  Continue drip.  Tachycardia resolving w/ fluid resuscitation as did Lactic acidosis.  UDS negative.  Leukocytosis also resolving w/o abx intervention.  Repeat procalcitonin.  Will speak to Dr. Delgado today about medication strategy.  Elytes by protocol.  AMI excluded by serial biomarkers.    Plan for disposition: Home when able    Enmanuel Mcdaniels MD  02/10/21  10:55 EST

## 2021-02-10 NOTE — PLAN OF CARE
Goal Outcome Evaluation:  Plan of Care Reviewed With: patient  Progress: no change  Outcome Summary: pt remains on insulin gtt & IVF per DKA protocol overnight; Electrolyte protocol and inititated, electrolytes replaced per protocol. Continue to trend serial labs.VSS overnight, pt remains tachycardic with HR in the 120's. Pt continues to c/o nausea & HA overnight; treated with zofran & tylenol per MAR. Continue to monitor.

## 2021-02-10 NOTE — CONSULTS
Adult Nutrition  Assessment/PES    Patient Name:  Yovanny Solitario  YOB: 1975  MRN: [id number]  Admit Date:  2/9/2021    Assessment Date:  2/10/2021    Comments:  Pt engaged but likely benefit from further reinforcement from DM Educator and possible outpatient DM class.     Reason for Assessment     Row Name 02/10/21 1152          Reason for Assessment    Reason For Assessment  diagnosis/disease state DM     Diagnosis  diabetes diagnosis/complications         Nutrition/Diet History     Row Name 02/10/21 1152          Nutrition/Diet History    Typical Food/Fluid Intake  Spoke w pt at bedside, easily awake. 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 Pt loves spinach but doesn't tolerate lettuce since GB yrs ago. Pt happy to hear she can have some bread/potatoes but needs to watch amounts. Pt reports sister went vegan but she not wiling to do that at this time. Pt reports no meds for almost a year         Anthropometrics     Row Name 02/10/21 0440          Anthropometrics    Weight  63.8 kg (140 lb 10.5 oz)                         Problem/Interventions:  Problem 1     Row Name 02/10/21 1153          Nutrition Diagnoses Problem 1    Problem 1  Knowledge Deficit     Etiology (related to)  MNT for Treatment/Condition;Medical Diagnosis     Endocrine  DM2     Signs/Symptoms (evidenced by)  Reported  Information Deficit                     Education/Evaluation     Row Name 02/10/21 1155          Education    Education  Education topics;Advised regarding habits/behavior;Provided education regarding Edu on HgA1c means & possible complications. Edu on goal below 7. Edu Reading Food label for serving/total CHO. Edu on making half plate veggies at lunch/dinner. Edu eating breakfast ( not just coffee). Edu on 45 gm CHO per meal. Edu on consistency.     Provided education regarding  Avoidance of associated complications;Diet rationale;Healthy eating for diabetes     Education Topics  --  Food Label & DM Nutrition Plate Method provided w RD contact.     Advised Regarding Habits/Behavior  Eating pattern;Label reading;Appropriate beverage;Meal planning;Appropriate portions;Self monitor;Food choices Edu on choosing whole grains, lean protein, planning ahead for work.        Monitor/Evaluation    Monitor  Per protocol;I&O;PO intake;Pertinent labs;Weight;Symptoms     Education Follow-up  Other (comment) pt verbalized understanding, will con to follow           Electronically signed by:  Kate Mckeon RD  02/10/21 11:57 EST

## 2021-02-10 NOTE — NURSING NOTE
Continued Stay Note   Merkel     Patient Name: Yovanny Solitario  MRN: [id number]  Today's Date: 2/10/2021    Admit Date: 2/9/2021    Discharge Plan     Row Name 02/10/21 1125       Plan    Plan  Discharge home with sister    Patient/Family in Agreement with Plan  yes    Plan Comments  I spoke with the patient at bedside with her permission.  She was laying in her bed quietly on her mobile phone.  I provided her with living will paperwork.  She had no further questions or concerns at this time.  CM will continue to follow.    Row Name 02/10/21 1032       Plan    Plan  Discharge home    Patient/Family in Agreement with Plan  yes    Plan Comments  I spoke with the patient at bedise with her permission.  I introduced myself and explained my role.  I verified the information on the facesheet and her PCP as Dr. Delgado.  She advised that she uses [facility] in [place] as her pharmacy and she is able to obtain and afford her medications.  She denied having a living will and requested paperwork for same.  She stated that she lives in a single story home for the past 9 months with her sister.  She stated that there are 2 steps to enter the home and she is able to gain entry to and maneuver around her home with no issues.  She further advised that she is independent with her ADL's and has a car and drives.  She stated that her sister will pick her up at discharge.  She has a glucometer at home and doesn't feel that she will need any other DME, home health or other community resources at discharge.  She will discharge home with her sister and is agreeable to that plan.  CM will continue to follow.        Discharge Codes    No documentation.             Mariel Lin RN

## 2021-02-10 NOTE — PLAN OF CARE
Discharge Planning Assessment   Danielle Mccord     Patient Name: Yovanny Solitario  MRN: [id number]  Today's Date: 2/10/2021    Admit Date: 2/9/2021    Discharge Needs Assessment       Row Name 02/10/21 1030       Living Environment    Lives With  sibling(s)    Name(s) of Who Lives With Patient  sister Flores    Current Living Arrangements  home/apartment/condo    Duration at Residence  9 months    Potentially Unsafe Housing Conditions  -- none    Primary Care Provided by  self    Provides Primary Care For  no one    Family Caregiver if Needed  sibling(s)    Family Caregiver Names  sister Flores    Quality of Family Relationships  unable to assess    Able to Return to Prior Arrangements  yes       Resource/Environmental Concerns    Resource/Environmental Concerns  none    Transportation Concerns  -- none       Transition Planning    Patient/Family Anticipates Transition to  home    Patient/Family Anticipated Services at Transition  none    Transportation Anticipated  family or friend will provide       Discharge Needs Assessment    Readmission Within the Last 30 Days  no previous admission in last 30 days    Current Outpatient/Agency/Support Group  -- none    Equipment Currently Used at Home  glucometer    Concerns to be Addressed  no discharge needs identified    Anticipated Changes Related to Illness  none    Equipment Needed After Discharge  none    Outpatient/Agency/Support Group Needs  -- none    Discharge Facility/Level of Care Needs  -- none    Provided Post Acute Provider List?  Refused    Refused Provider List Comment  Pt declined    Provided Post Acute Provider Quality & Resource List?  Refused    Refused Quality and Resource List Comment  Pt declined    Current Discharge Risk  -- none identified          Discharge Plan       Row Name 02/10/21 1032       Plan    Plan  Discharge home    Patient/Family in Agreement with Plan  yes    Plan Comments  I spoke with the patient at bedside with her permission.  I  introduced myself and explained my role.  I verified the information on the facesheet and her PCP as Dr. Delgado.  She advised that she uses [facility] in [place] as her pharmacy and she is able to obtain and afford her medications.  She denied having a living will and requested paperwork for same.  She stated that she lives in a single story home for the past 9 months with her sister.  She stated that there are 2 steps to enter the home and she is able to gain entry to and maneuver around her home with no issues.  She further advised that she is independent with her ADL's and has a car and drives.  She stated that her sister will pick her up at discharge.  She has a glucometer at home and doesn't feel that she will need any other DME, home health or other community resources at discharge.  She will discharge home with her sister and is agreeable to that plan.  CM will continue to follow.          Continued Care and Services - Admitted Since 2/9/2021    Coordination has not been started for this encounter.         Demographic Summary       Row Name 02/10/21 1030       General Information    Admission Type  observation    Arrived From  home    Referral Source  admission list    Reason for Consult  discharge planning    Preferred Language  English     Used During This Interaction  no       Contact Information    Permission Granted to Share Info With            Functional Status    No documentation.       Psychosocial    No documentation.       Abuse/Neglect    No documentation.       Legal    No documentation.       Substance Abuse    No documentation.       Patient Forms    No documentation.           Mariel Lin RN  Goal Outcome Evaluation:

## 2021-02-11 ENCOUNTER — READMISSION MANAGEMENT (OUTPATIENT)
Dept: CALL CENTER | Facility: HOSPITAL | Age: 46
End: 2021-02-11

## 2021-02-11 VITALS
HEART RATE: 91 BPM | RESPIRATION RATE: 18 BRPM | DIASTOLIC BLOOD PRESSURE: 83 MMHG | BODY MASS INDEX: 28.13 KG/M2 | WEIGHT: 143.3 LBS | TEMPERATURE: 98.2 F | SYSTOLIC BLOOD PRESSURE: 121 MMHG | OXYGEN SATURATION: 100 % | HEIGHT: 60 IN

## 2021-02-11 LAB
ANION GAP SERPL CALCULATED.3IONS-SCNC: 7.6 MMOL/L (ref 5–15)
BASOPHILS # BLD AUTO: 0.02 10*3/MM3 (ref 0–0.2)
BASOPHILS NFR BLD AUTO: 0.3 % (ref 0–1.5)
BUN SERPL-MCNC: 2 MG/DL (ref 6–20)
BUN/CREAT SERPL: 5 (ref 7–25)
CALCIUM SPEC-SCNC: 7.7 MG/DL (ref 8.6–10.5)
CHLORIDE SERPL-SCNC: 107 MMOL/L (ref 98–107)
CO2 SERPL-SCNC: 22.4 MMOL/L (ref 22–29)
CREAT SERPL-MCNC: 0.4 MG/DL (ref 0.57–1)
DEPRECATED RDW RBC AUTO: 39.4 FL (ref 37–54)
EOSINOPHIL # BLD AUTO: 0.12 10*3/MM3 (ref 0–0.4)
EOSINOPHIL NFR BLD AUTO: 2 % (ref 0.3–6.2)
ERYTHROCYTE [DISTWIDTH] IN BLOOD BY AUTOMATED COUNT: 12.6 % (ref 12.3–15.4)
GFR SERPL CREATININE-BSD FRML MDRD: >150 ML/MIN/1.73
GFR SERPL CREATININE-BSD FRML MDRD: >150 ML/MIN/1.73
GLUCOSE BLDC GLUCOMTR-MCNC: 203 MG/DL (ref 70–130)
GLUCOSE SERPL-MCNC: 166 MG/DL (ref 65–99)
HCT VFR BLD AUTO: 35.3 % (ref 34–46.6)
HGB BLD-MCNC: 12.6 G/DL (ref 12–15.9)
IMM GRANULOCYTES # BLD AUTO: 0.02 10*3/MM3 (ref 0–0.05)
IMM GRANULOCYTES NFR BLD AUTO: 0.3 % (ref 0–0.5)
LYMPHOCYTES # BLD AUTO: 1.75 10*3/MM3 (ref 0.7–3.1)
LYMPHOCYTES NFR BLD AUTO: 29.8 % (ref 19.6–45.3)
MAGNESIUM SERPL-MCNC: 2.1 MG/DL (ref 1.6–2.6)
MCH RBC QN AUTO: 30.8 PG (ref 26.6–33)
MCHC RBC AUTO-ENTMCNC: 35.7 G/DL (ref 31.5–35.7)
MCV RBC AUTO: 86.3 FL (ref 79–97)
MONOCYTES # BLD AUTO: 0.46 10*3/MM3 (ref 0.1–0.9)
MONOCYTES NFR BLD AUTO: 7.8 % (ref 5–12)
NEUTROPHILS NFR BLD AUTO: 3.5 10*3/MM3 (ref 1.7–7)
NEUTROPHILS NFR BLD AUTO: 59.8 % (ref 42.7–76)
NRBC BLD AUTO-RTO: 0 /100 WBC (ref 0–0.2)
PHOSPHATE SERPL-MCNC: 3 MG/DL (ref 2.5–4.5)
PLATELET # BLD AUTO: 221 10*3/MM3 (ref 140–450)
PMV BLD AUTO: 10.5 FL (ref 6–12)
POTASSIUM SERPL-SCNC: 3.6 MMOL/L (ref 3.5–5.2)
RBC # BLD AUTO: 4.09 10*6/MM3 (ref 3.77–5.28)
SODIUM SERPL-SCNC: 137 MMOL/L (ref 136–145)
WBC # BLD AUTO: 5.87 10*3/MM3 (ref 3.4–10.8)

## 2021-02-11 PROCEDURE — 84100 ASSAY OF PHOSPHORUS: CPT | Performed by: NURSE PRACTITIONER

## 2021-02-11 PROCEDURE — 85025 COMPLETE CBC W/AUTO DIFF WBC: CPT | Performed by: NURSE PRACTITIONER

## 2021-02-11 PROCEDURE — 25010000002 METOCLOPRAMIDE PER 10 MG: Performed by: HOSPITALIST

## 2021-02-11 PROCEDURE — 83735 ASSAY OF MAGNESIUM: CPT | Performed by: NURSE PRACTITIONER

## 2021-02-11 PROCEDURE — 82962 GLUCOSE BLOOD TEST: CPT

## 2021-02-11 PROCEDURE — 80048 BASIC METABOLIC PNL TOTAL CA: CPT | Performed by: NURSE PRACTITIONER

## 2021-02-11 PROCEDURE — 25010000002 ENOXAPARIN PER 10 MG: Performed by: HOSPITALIST

## 2021-02-11 PROCEDURE — 99238 HOSP IP/OBS DSCHRG MGMT 30/<: CPT | Performed by: HOSPITALIST

## 2021-02-11 RX ADMIN — SODIUM CHLORIDE, PRESERVATIVE FREE 3 ML: 5 INJECTION INTRAVENOUS at 08:43

## 2021-02-11 RX ADMIN — ENOXAPARIN SODIUM 40 MG: 40 INJECTION SUBCUTANEOUS at 11:04

## 2021-02-11 RX ADMIN — METFORMIN HYDROCHLORIDE 500 MG: 500 TABLET ORAL at 11:04

## 2021-02-11 RX ADMIN — SODIUM CHLORIDE, PRESERVATIVE FREE 10 ML: 5 INJECTION INTRAVENOUS at 08:42

## 2021-02-11 RX ADMIN — METOCLOPRAMIDE 5 MG: 5 INJECTION, SOLUTION INTRAMUSCULAR; INTRAVENOUS at 06:06

## 2021-02-11 NOTE — PLAN OF CARE
Goal Outcome Evaluation:  Plan of Care Reviewed With: patient  Progress: improving  Outcome Summary: KPhos given overnight. VSS. No events. Patient hopeful she is D/C'd today

## 2021-02-11 NOTE — CONSULTS
"Consult from [facility] order set. PT presented with nausea and vomiting for 4 days. States started new meds and had a bad reaction. Was not testing or taking meds for about a year due to job situation and lack of insurance coverage. When she got insurance, suffered delays due to prior authorization. States really wants to get diabetes under control. Has only had \"word of mouth\" education from family members with diabetes. Open to coming in as an outpatient for comprehensive diabetes education. Will call patient tomorrow per her request to set up an appointment.  "

## 2021-02-11 NOTE — DISCHARGE SUMMARY
Yovanny Solitario  1975  [id number]    Hospitalists Discharge Summary    Date of Admission: 2/9/2021  Date of Discharge:  2/11/2021    Primary Discharge Diagnoses:  1.  Diabetic Ketoacidosis    History of Present Illness (taken from H&P):    Ms. Solitario is a pleasant, 46 y/o HF who presents w/ a 3-day history of continued nausea and vomiting after starting Synjardy and Trulicity on Saturday.  She reports she has been off of a diabetic regimen for almost a year after changing jobs.  She reports good glucose control in the past and in fact, she has been on Synjardy before w/o issue.  She reports shortly after taking her medicine, she became nauseated and had emesis.  She does not think she threw-up her medicine.  She denies blood in her emesis as well.  Anything she tried to take PO (Pedialyte) was rejected.  She denies abdominal pain.  Starting this morning, she began having non-bloody diarrhea.  She began to feel weak and was having chest pressure from throwing up so much.  She denies fever and chills, but staff note she was clammy when she arrived in the unit.  She denies sick contacts.  She does note some occasional burning and tingling in her toes and sometimes her fingers.    Hospital Course:  Ms. Solitario was admitted to the ICU under the DKA protocol.  Her gap closed and her acidosis resolved w/ therapy.  I called and spoke w/ Dr. Delgado concerning post-discharge therapy and further work-up.  Insulin antibodies and Glutamic Acid Decarboxylase antibodies were ordered.  I transitioned her back to Metformin and added Insulin glargine.  I also asked staff to make an appointment w/ Dr. Fritz for her because she was concerned about gastroparesis.  I explained to her that good glucose control was paramount to controlling those symptoms.  She expressed understanding.    PCP  Patient Care Team:  Tatyana Delgado MD as PCP - General (Internal Medicine & Pediatrics)    Consults:   Consults     No orders  found from 1/11/2021 to 2/10/2021.          Operations and Procedures Performed:       Xr Chest 1 View    Result Date: 2/9/2021  Narrative: CHEST X-RAY, 02/09/2021     HISTORY: 45-year-old female in the ED complaining of 3 day history weakness, nausea and mid chest pain.  TECHNIQUE: AP portable upright chest x-ray.  FINDINGS: The lungs are expanded and clear. No visible focal or multifocal pulmonary infiltrate. No pleural effusion. Heart size and pulmonary vascularity are normal.      Impression: Negative chest.  This report was finalized on 2/9/2021 9:20 AM by Dr. Brandon Wyatt MD.        Allergies:  is allergic to morphine; latex; and penicillins.    Jah  reviewed    Discharge Medications:     Discharge Medications      New Medications      Instructions Start Date   Insulin Glargine 100 UNIT/ML injection pen  Commonly known as: LANTUS SOLOSTAR   10 Units, Subcutaneous, Nightly      metFORMIN 500 MG tablet  Commonly known as: GLUCOPHAGE   500 mg, Oral, 2 Times Daily With Meals         Continue These Medications      Instructions Start Date   Biotin 1000 MCG chewable tablet   Oral      cholecalciferol 25 MCG (1000 UT) tablet  Commonly known as: VITAMIN D3   2,000 Units, Oral, Daily      glucose blood test strip   Use as instructed      glucose monitor monitoring kit   1 each, Does not apply, As Needed      Lancets misc   1 each, Does not apply, 2 Times Daily      multivitamin with minerals tablet tablet   Oral         Stop These Medications    Synjardy 12.5-1000 MG tablet  Generic drug: Empagliflozin-metFORMIN HCl     Trulicity 1.5 MG/0.5ML solution pen-injector  Generic drug: Dulaglutide            Last Lab Results:   Lab Results (most recent)     Procedure Component Value Units Date/Time    Blood Culture - Blood, Arm, Right [[id number]] Collected: 02/09/21 0922    Specimen: Blood from Arm, Right Updated: 02/11/21 0945     Blood Culture No growth at 2 days    Blood Culture - Blood, Arm, Right [[id number]]  Collected: 02/09/21 0820    Specimen: Blood from Arm, Right Updated: 02/11/21 0901     Blood Culture No growth at 2 days    Phosphorus [[id number]]  (Normal) Collected: 02/11/21 0553    Specimen: Blood Updated: 02/11/21 0623     Phosphorus 3.0 mg/dL     Basic Metabolic Panel [[id number]]  (Abnormal) Collected: 02/11/21 0553    Specimen: Blood Updated: 02/11/21 0622     Glucose 166 mg/dL      BUN 2 mg/dL      Creatinine 0.40 mg/dL      Sodium 137 mmol/L      Potassium 3.6 mmol/L      Chloride 107 mmol/L      CO2 22.4 mmol/L      Calcium 7.7 mg/dL      eGFR  African Amer >150 mL/min/1.73      eGFR Non African Amer >150 mL/min/1.73      BUN/Creatinine Ratio 5.0     Anion Gap 7.6 mmol/L     Narrative:      GFR Normal >60  Chronic Kidney Disease <60  Kidney Failure <15      Magnesium [[id number]]  (Normal) Collected: 02/11/21 0553    Specimen: Blood Updated: 02/11/21 0622     Magnesium 2.1 mg/dL     CBC & Differential [[id number]]  (Normal) Collected: 02/11/21 0553    Specimen: Blood Updated: 02/11/21 0606    Narrative:      The following orders were created for panel order CBC & Differential.  Procedure                               Abnormality         Status                     ---------                               -----------         ------                     CBC Auto Differential[[id number]]        Normal              Final result                 Please view results for these tests on the individual orders.    CBC Auto Differential [[id number]]  (Normal) Collected: 02/11/21 0553    Specimen: Blood Updated: 02/11/21 0606     WBC 5.87 10*3/mm3      RBC 4.09 10*6/mm3      Hemoglobin 12.6 g/dL      Hematocrit 35.3 %      MCV 86.3 fL      MCH 30.8 pg      MCHC 35.7 g/dL      RDW 12.6 %      RDW-SD 39.4 fl      MPV 10.5 fL      Platelets 221 10*3/mm3      Neutrophil % 59.8 %      Lymphocyte % 29.8 %      Monocyte % 7.8 %      Eosinophil % 2.0 %      Basophil % 0.3 %      Immature Grans % 0.3 %      Neutrophils, Absolute 3.50  10*3/mm3      Lymphocytes, Absolute 1.75 10*3/mm3      Monocytes, Absolute 0.46 10*3/mm3      Eosinophils, Absolute 0.12 10*3/mm3      Basophils, Absolute 0.02 10*3/mm3      Immature Grans, Absolute 0.02 10*3/mm3      nRBC 0.0 /100 WBC     POC Glucose Once [[id number]]  (Abnormal) Collected: 02/10/21 2203    Specimen: Blood Updated: 02/10/21 2209     Glucose 162 mg/dL     Phosphorus [[id number]]  (Abnormal) Collected: 02/10/21 1704    Specimen: Blood Updated: 02/10/21 1735     Phosphorus 1.7 mg/dL     Basic Metabolic Panel [[id number]]  (Abnormal) Collected: 02/10/21 1704    Specimen: Blood Updated: 02/10/21 1734     Glucose 147 mg/dL      BUN 2 mg/dL      Creatinine 0.43 mg/dL      Sodium 140 mmol/L      Potassium 3.3 mmol/L      Comment: Slight hemolysis detected by analyzer. Results may be affected.        Chloride 114 mmol/L      CO2 18.8 mmol/L      Calcium 6.7 mg/dL      eGFR  African Amer >150 mL/min/1.73      eGFR Non African Amer >150 mL/min/1.73      BUN/Creatinine Ratio 4.7     Anion Gap 7.2 mmol/L     Narrative:      GFR Normal >60  Chronic Kidney Disease <60  Kidney Failure <15      Magnesium [[id number]]  (Normal) Collected: 02/10/21 1704    Specimen: Blood Updated: 02/10/21 1730     Magnesium 1.9 mg/dL     POC Glucose Once [[id number]]  (Normal) Collected: 02/10/21 1705    Specimen: Blood Updated: 02/10/21 1712     Glucose 111 mg/dL     Glutamic Acid Decarboxylase [[id number]] Collected: 02/10/21 1153    Specimen: Blood Updated: 02/10/21 1518    Blood Gas, Arterial - [[id number]] Collected: 02/10/21 0105    Specimen: Arterial Blood Updated: 02/10/21 1203     Site --     Comment: Incorrectly reported  Corrected result. Previous result was Nurse/Dr Draw on 2/10/2021 at 0107 EST.        Sarthak's Test --     Comment: Incorrectly reported  Corrected result. Previous result was N/A on 2/10/2021 at 0107 EST.        pH, Arterial --     Comment: Incorrectly reported  Corrected result. Previous result was 7.208 pH  units on 2/10/2021 at 0107 EST.        pCO2, Arterial --     Comment: Incorrectly reported  Corrected result. Previous result was 35.7 mm Hg on 2/10/2021 at 0107 EST.        pO2, Arterial --     Comment: Incorrectly reported  Corrected result. Previous result was 31.1 mm Hg on 2/10/2021 at 0107 EST.        HCO3, Arterial --     Comment: Incorrectly reported  Corrected result. Previous result was 14.2 mmol/L on 2/10/2021 at 0107 EST.        Base Excess, Arterial --     Comment: Incorrectly reported  Corrected result. Previous result was -12.8 mmol/L on 2/10/2021 at 0107 EST.        O2 Saturation, Arterial --     Comment: Incorrectly reported  Corrected result. Previous result was 62.0 % on 2/10/2021 at 0107 EST.        Hemoglobin, Blood Gas --     Comment: Corrected result. Previous result was 13.6 g/dL on 2/10/2021 at 0107 EST.        Hematocrit, Blood Gas --     Comment: Incorrectly reported        Temperature --     Comment: Corrected result. Previous result was 37.0 C on 2/10/2021 at 0107 EST.        Barometric Pressure for Blood Gas --     Comment: Incorrectly reported  Corrected result. Previous result was 745 mmHg on 2/10/2021 at 0107 EST.        Modality --     Comment: Incorrectly reported  Corrected result. Previous result was Room Air on 2/10/2021 at 0107 EST.        Ventilator Mode --     Comment: Meter: [id number]     :  [id number]  Corrected result. Previous result was NA on 2/10/2021 at 0107 EST.        Notified Who --     Comment: Corrected result. Previous result was helena rn on 2/10/2021 at 0107 EST.        Notified By --     Comment: Corrected result. Previous result was [id number] on 2/10/2021 at 0107 EST.        Notified Time --     Comment: Corrected result. Previous result was 02/10/2021 01:08 on 2/10/2021 at 0107 EST.        pCO2, Temperature Corrected --     Comment: Corrected result. Previous result was 35.7 mm Hg on 2/10/2021 at 0107 EST.        pH, Temp Corrected --     Comment:  Corrected result. Previous result was 7.208 pH Units on 2/10/2021 at 0107 EST.        pO2, Temperature Corrected --     Comment: Corrected result. Previous result was 31.1 mm Hg on 2/10/2021 at 0107 EST.       Insulin Antibody [[id number]] Collected: 02/10/21 1153    Specimen: Blood Updated: 02/10/21 1154    Procalcitonin [[id number]]  (Normal) Collected: 02/10/21 0533    Specimen: Blood from Arm, Right Updated: 02/10/21 1121     Procalcitonin 0.08 ng/mL     Narrative:      Results may be falsely decreased if patient taking Biotin.     Blood Gas, Venous - [[id number]]  (Abnormal) Collected: 02/10/21 0105    Specimen: Venous Blood Updated: 02/10/21 1020     Site Nurse/Dr Draw     pH, Venous 7.208 pH Units      pCO2, Venous 35.7 mm Hg      pO2, Venous 31.1 mm Hg      HCO3, Venous 14.2 mmol/L      Base Excess, Venous -12.8 mmol/L      O2 Saturation, Venous 62.0 %      Hemoglobin, Blood Gas 13.6 g/dL      Barometric Pressure for Blood Gas 745 mmHg      Modality Room Air     Notified [facility] Ramya RN     Notified By [id number]     Notified Time 02/10/2021 0500     Collected by nurse    Comprehensive Metabolic Panel [[id number]]  (Abnormal) Collected: 02/10/21 0533    Specimen: Blood from Arm, Right Updated: 02/10/21 0623     Glucose 219 mg/dL      BUN 5 mg/dL      Creatinine 0.64 mg/dL      Sodium 135 mmol/L      Potassium 4.5 mmol/L      Comment: Slight hemolysis detected by analyzer. Results may be affected.        Chloride 110 mmol/L      CO2 15.8 mmol/L      Calcium 7.2 mg/dL      Total Protein 5.7 g/dL      Albumin 3.40 g/dL      ALT (SGPT) 17 U/L      AST (SGOT) 15 U/L      Comment: Slight hemolysis detected by analyzer. Results may be affected.        Alkaline Phosphatase 84 U/L      Total Bilirubin 0.9 mg/dL      eGFR Non African Amer 101 mL/min/1.73      eGFR  African Amer 123 mL/min/1.73      Globulin 2.3 gm/dL      A/G Ratio 1.5 g/dL      BUN/Creatinine Ratio 7.8     Anion Gap 9.2 mmol/L     Narrative:      GFR Normal  >60  Chronic Kidney Disease <60  Kidney Failure <15      CBC & Differential [[id number]]  (Abnormal) Collected: 02/10/21 0533    Specimen: Blood from Arm, Right Updated: 02/10/21 0603    Narrative:      The following orders were created for panel order CBC & Differential.  Procedure                               Abnormality         Status                     ---------                               -----------         ------                     CBC Auto Differential[[id number]]        Abnormal            Final result                 Please view results for these tests on the individual orders.    CBC Auto Differential [[id number]]  (Abnormal) Collected: 02/10/21 0533    Specimen: Blood from Arm, Right Updated: 02/10/21 0603     WBC 14.27 10*3/mm3      RBC 4.14 10*6/mm3      Hemoglobin 12.8 g/dL      Hematocrit 36.3 %      MCV 87.7 fL      MCH 30.9 pg      MCHC 35.3 g/dL      RDW 12.7 %      RDW-SD 40.7 fl      MPV 11.4 fL      Platelets 249 10*3/mm3      Neutrophil % 78.8 %      Lymphocyte % 13.3 %      Monocyte % 6.8 %      Eosinophil % 0.3 %      Basophil % 0.3 %      Immature Grans % 0.5 %      Neutrophils, Absolute 11.25 10*3/mm3      Lymphocytes, Absolute 1.90 10*3/mm3      Monocytes, Absolute 0.97 10*3/mm3      Eosinophils, Absolute 0.04 10*3/mm3      Basophils, Absolute 0.04 10*3/mm3      Immature Grans, Absolute 0.07 10*3/mm3      nRBC 0.0 /100 WBC     Troponin [[id number]]  (Normal) Collected: 02/09/21 1210    Specimen: Blood Updated: 02/09/21 1526     Troponin T <0.010 ng/mL     Narrative:      Troponin T Reference Range:  <= 0.03 ng/mL-   Negative for AMI  >0.03 ng/mL-     Abnormal for myocardial necrosis.  Clinicians would have to utilize clinical acumen, EKG, Troponin and serial changes to determine if it is an Acute Myocardial Infarction or myocardial injury due to an underlying chronic condition.       Results may be falsely decreased if patient taking Biotin.      TSH [[id number]]  (Normal) Collected:  02/09/21 1210    Specimen: Blood Updated: 02/09/21 1508     TSH 0.516 uIU/mL     Urine Drug Screen - Urine, Clean Catch [[id number]]  (Normal) Collected: 02/09/21 0834    Specimen: Urine, Clean Catch Updated: 02/09/21 1500     THC, Screen, Urine Negative     Phencyclidine (PCP), Urine Negative     Cocaine Screen, Urine Negative     Methamphetamine, Ur Negative     Opiate Screen Negative     Amphetamine Screen, Urine Negative     Benzodiazepine Screen, Urine Negative     Tricyclic Antidepressants Screen Negative     Methadone Screen, Urine Negative     Barbiturates Screen, Urine Negative     Oxycodone Screen, Urine Negative     Propoxyphene Screen Negative     Buprenorphine, Screen, Urine Negative    Narrative:      Urine drug screen results are to be used for medical purposes only.  They are not to be used for legal purposes such as employment testing.  Negative results do not necessarily mean the complete absence of a subtance, but rather that the result is less than the cutoff for that substance.  Positive results are unconfirmed and considered Preliminary Positive.  [facility] does not automatically confirm Postitive Unconfirmed results.  The physician may request (order) an Unconfirmed Positive result to be sent out for confirmation.      Negative Thresholds for Drugs Screened:    THC screen, urine                          50 ng/ml  Phenycyclidine (PCP), urine                25 ng/ml  Cocaine screen, urine                     150 ng/ml  Methamphetamine, urine                    500 ng/ml  Opiate screen, urine                      100 ng/ml  Amphetamine screen, urine                 500 ng/ml  Benzodiazepine screen, urine              150 ng/ml  Tricyclic Antidepressants screen, urine   300 ng/ml  Methadone screen, urine                   200 ng/ml  Barbiturates screen, urine                200 ng/ml  Oxycodone screen, urine                   100 ng/ml  Propoxyphene screen, urine                300  ng/ml  Buprenorphine screen, urine                10 ng/ml    Lactic Acid, Reflex [[id number]]  (Abnormal) Collected: 02/09/21 1211    Specimen: Blood Updated: 02/09/21 1237     Lactate 2.1 mmol/L     Lactic Acid, Reflex Timer (This will reflex a repeat order 3-3:15 hours after ordered.) [[id number]] Collected: 02/09/21 0815    Specimen: Blood Updated: 02/09/21 1146     Hold Tube Hold for add-ons.     Comment: Auto resulted.       Troponin [[id number]]  (Normal) Collected: 02/09/21 0741    Specimen: Blood Updated: 02/09/21 1038     Troponin T <0.010 ng/mL     Narrative:      Troponin T Reference Range:  <= 0.03 ng/mL-   Negative for AMI  >0.03 ng/mL-     Abnormal for myocardial necrosis.  Clinicians would have to utilize clinical acumen, EKG, Troponin and serial changes to determine if it is an Acute Myocardial Infarction or myocardial injury due to an underlying chronic condition.       Results may be falsely decreased if patient taking Biotin.      Hemoglobin A1c [[id number]]  (Abnormal) Collected: 02/09/21 0741    Specimen: Blood Updated: 02/09/21 1038     Hemoglobin A1C 10.60 %     Narrative:      Hemoglobin A1C Ranges:    Increased Risk for Diabetes  5.7% to 6.4%  Diabetes                     >= 6.5%  Diabetic Goal                < 7.0%    COVID PRE-OP / PRE-PROCEDURE SCREENING ORDER (NO ISOLATION) - Swab, Nasal Cavity [[id number]]  (Normal) Collected: 02/09/21 0837    Specimen: Swab from Nasal Cavity Updated: 02/09/21 0924    Narrative:      The following orders were created for panel order COVID PRE-OP / PRE-PROCEDURE SCREENING ORDER (NO ISOLATION) - Swab, Nasal Cavity.  Procedure                               Abnormality         Status                     ---------                               -----------         ------                     COVID-19,[facility] [place]...[[id number]]  Normal              Final result                 Please view results for these tests on the individual orders.    COVID-19,[facility] [employer]  IN-HOUSE,Nasal Swab No Transport Media 3-4 HR TAT - Swab, Nasal Cavity [[id number]]  (Normal) Collected: 02/09/21 0837    Specimen: Swab from Nasal Cavity Updated: 02/09/21 0924     COVID19 Not Detected    Narrative:      Fact sheet for providers: https://www.fda.gov/media/096391/download     Fact sheet for patients: https://www.fda.gov/media/796613/download    Test performed by PCR.    Consider negative results in combination with clinical observations, patient history, and epidemiological information.    Blood Gas, Venous - [[id number]]  (Abnormal) Collected: 02/09/21 0845    Specimen: Venous Blood Updated: 02/09/21 0857     Site OTHER     pH, Venous 7.015 pH Units      Comment: 85 Value below critical limit        pCO2, Venous 41.7 mm Hg      pO2, Venous 35.0 mm Hg      HCO3, Venous 10.6 mmol/L      Comment: 84 Value below reference range        Base Excess, Venous -20.2 mmol/L      Comment: 84 Value below reference range        O2 Saturation, Venous 54.1 %      Hemoglobin, Blood Gas 17.5 g/dL      Temperature 37.0 C      Barometric Pressure for Blood Gas 743 mmHg      Modality Room Air     Ventilator Mode NA     Comment: Meter: [id number]     :  [id number]        Notified Who RB AND ROSA RICH RN AT 0855     Notified By [id number]     Notified Time 02/09/2021 08:56    Urinalysis With Culture If Indicated - Urine, Clean Catch [[id number]]  (Abnormal) Collected: 02/09/21 0834    Specimen: Urine, Clean Catch Updated: 02/09/21 0856     Color, UA Yellow     Appearance, UA Clear     pH, UA <=5.0     Specific Gravity, UA 1.025     Glucose,  mg/dL (2+)     Ketones, UA >=160 mg/dL (4+)     Bilirubin, UA Negative     Blood, UA Negative     Protein, UA Trace     Leuk Esterase, UA Negative     Nitrite, UA Negative     Urobilinogen, UA 0.2 E.U./dL    Narrative:      Urine microscopic not indicated.    Lactic Acid, Plasma [[id number]]  (Abnormal) Collected: 02/09/21 0815    Specimen: Blood Updated: 02/09/21 0842       Lactate 3.3 mmol/L     Procalcitonin [[id number]]  (Normal) Collected: 02/09/21 0741    Specimen: Blood Updated: 02/09/21 0837     Procalcitonin 0.09 ng/mL     Narrative:      Results may be falsely decreased if patient taking Biotin.     Comprehensive Metabolic Panel [[id number]]  (Abnormal) Collected: 02/09/21 0741    Specimen: Blood Updated: 02/09/21 0807     Glucose 326 mg/dL      BUN 18 mg/dL      Creatinine 1.06 mg/dL      Sodium 138 mmol/L      Potassium 4.0 mmol/L      Chloride 96 mmol/L      CO2 11.4 mmol/L      Calcium 9.9 mg/dL      Total Protein 9.3 g/dL      Albumin 5.50 g/dL      ALT (SGPT) 38 U/L      AST (SGOT) 17 U/L      Alkaline Phosphatase 160 U/L      Total Bilirubin 1.2 mg/dL      eGFR Non African Amer 57 mL/min/1.73      eGFR  African Amer 69 mL/min/1.73      Globulin 3.8 gm/dL      A/G Ratio 1.4 g/dL      BUN/Creatinine Ratio 17.0     Anion Gap 30.6 mmol/L     Narrative:      GFR Normal >60  Chronic Kidney Disease <60  Kidney Failure <15      hCG, Serum, Qualitative [[id number]]  (Normal) Collected: 02/09/21 0741    Specimen: Blood Updated: 02/09/21 0758     HCG Qualitative Negative        Imaging Results (Most Recent)     Procedure Component Value Units Date/Time    XR Chest 1 View [[id number]] Collected: 02/09/21 0919     Updated: 02/09/21 0922    Narrative:      CHEST X-RAY, 02/09/2021         HISTORY:  45-year-old female in the ED complaining of 3 day history weakness,  nausea and mid chest pain.     TECHNIQUE:  AP portable upright chest x-ray.     FINDINGS:  The lungs are expanded and clear. No visible focal or multifocal  pulmonary infiltrate. No pleural effusion. Heart size and pulmonary  vascularity are normal.       Impression:      Negative chest.     This report was finalized on 2/9/2021 9:20 AM by Dr. Brandon Wyatt MD.             PROCEDURES      Condition on Discharge:  Stable    Physical Exam at Discharge  Vital Signs  Temp:  [98.2 °F (36.8 °C)-98.7 °F (37.1 °C)] 98.2 °F  (36.8 °C)  Heart Rate:  [] 94  Resp:  [18-20] 18  BP: ()/(67-87) 108/70    Physical Exam:  Physical Exam   Constitutional: Patient appears well-developed and well-nourished and in no acute distress   Cardiovascular: Regular rate, regular rhythm, S1 normal and S2 normal.  Exam reveals no gallop and no friction rub.  No murmur heard.  Pulmonary/Chest: Lungs are clear to auscultation bilaterally. No respiratory distress. No wheezes. No rhonchi. No rales.   Abdominal: Soft. Bowel sounds are normal. There is no tenderness.   Musculoskeletal: Normal Muscle tone  Extremities: No edema. No asymmetry.  Neurological: Cranial nerves II-XII are grossly intact with no focal deficits.    Discharge Disposition  Home    Visiting Nurse:    No     Home PT/OT:  No     Home Safety Evaluation:  No     DME  None    Discharge Diet:      Dietary Orders (From admission, onward)     Start     Ordered    02/10/21 1746  Diet Regular; Consistent Carbohydrate  Diet Effective Now     Question Answer Comment   Diet Texture / Consistency Regular    Common Modifiers Consistent Carbohydrate        02/10/21 1745                Activity at Discharge:  As tolerated    Follow-up Appointments  No future appointments.  Additional Instructions for the Follow-ups that You Need to Schedule     Discharge Follow-up with PCP   As directed       Currently Documented PCP:    Tatyana Delgado MD    PCP Phone Number:    [phone]     Follow Up Details: 1-2 weeks         Discharge Follow-up with Specified Provider: Dr. Fritz; 2 Weeks   As directed      To: Dr. Fritz    Follow Up: 2 Weeks               Test Results Pending at Discharge  Pending Labs     Order Current Status    Glutamic Acid Decarboxylase In process    Insulin Antibody In process    Blood Culture - Blood, Arm, Right Preliminary result    Blood Culture - Blood, Arm, Right Preliminary result           Enmanuel Mcdaniels MD  02/11/21  10:42 EST    Time: <30 minutes

## 2021-02-11 NOTE — OUTREACH NOTE
Prep Survey      Responses   [facility] facility patient discharged from?  [place]   Is LACE score < 7 ?  Yes   Emergency Room discharge w/ pulse ox?  No   Eligibility  [facility] [place]   Date of Admission  02/09/21   Date of Discharge  02/11/21   Discharge Disposition  Home or Self Care   Discharge diagnosis  Diabetic acidosis without coma    Does the patient have one of the following disease processes/diagnoses(primary or secondary)?  Other   Does the patient have Home health ordered?  No   Is there a DME ordered?  No   Prep survey completed?  Yes          Cynthia Lares RN

## 2021-02-12 ENCOUNTER — OFFICE VISIT (OUTPATIENT)
Dept: INTERNAL MEDICINE | Facility: CLINIC | Age: 46
End: 2021-02-12

## 2021-02-12 ENCOUNTER — TRANSITIONAL CARE MANAGEMENT TELEPHONE ENCOUNTER (OUTPATIENT)
Dept: CALL CENTER | Facility: HOSPITAL | Age: 46
End: 2021-02-12

## 2021-02-12 VITALS
RESPIRATION RATE: 18 BRPM | WEIGHT: 142.4 LBS | BODY MASS INDEX: 27.96 KG/M2 | SYSTOLIC BLOOD PRESSURE: 112 MMHG | HEIGHT: 60 IN | OXYGEN SATURATION: 99 % | TEMPERATURE: 97.1 F | HEART RATE: 96 BPM | DIASTOLIC BLOOD PRESSURE: 72 MMHG

## 2021-02-12 DIAGNOSIS — R11.0 NAUSEA: ICD-10-CM

## 2021-02-12 DIAGNOSIS — E11.10 DIABETIC KETOACIDOSIS WITHOUT COMA ASSOCIATED WITH TYPE 2 DIABETES MELLITUS (HCC): ICD-10-CM

## 2021-02-12 DIAGNOSIS — E11.65 UNCONTROLLED TYPE 2 DIABETES MELLITUS WITH HYPERGLYCEMIA (HCC): Primary | ICD-10-CM

## 2021-02-12 DIAGNOSIS — Z09 HOSPITAL DISCHARGE FOLLOW-UP: ICD-10-CM

## 2021-02-12 LAB — GAD65 AB SER IA-ACNC: <5 U/ML (ref 0–5)

## 2021-02-12 PROCEDURE — 99496 TRANSJ CARE MGMT HIGH F2F 7D: CPT | Performed by: INTERNAL MEDICINE

## 2021-02-12 RX ORDER — CETIRIZINE HYDROCHLORIDE 10 MG/1
TABLET ORAL
COMMUNITY
Start: 2021-01-29

## 2021-02-12 RX ORDER — FLUTICASONE PROPIONATE 50 MCG
SPRAY, SUSPENSION (ML) NASAL
COMMUNITY
Start: 2021-01-29

## 2021-02-12 RX ORDER — BLOOD SUGAR DIAGNOSTIC
1 STRIP MISCELLANEOUS DAILY
Qty: 100 EACH | Refills: 3 | Status: SHIPPED | OUTPATIENT
Start: 2021-02-12 | End: 2022-01-26 | Stop reason: SDUPTHER

## 2021-02-12 RX ORDER — ONDANSETRON HYDROCHLORIDE 8 MG/1
8 TABLET, FILM COATED ORAL EVERY 8 HOURS PRN
Qty: 20 TABLET | Refills: 1 | Status: SHIPPED | OUTPATIENT
Start: 2021-02-12 | End: 2021-04-12

## 2021-02-12 RX ORDER — OMEPRAZOLE 20 MG/1
20 CAPSULE, DELAYED RELEASE ORAL DAILY
Qty: 30 CAPSULE | Refills: 0 | Status: SHIPPED | OUTPATIENT
Start: 2021-02-12 | End: 2021-06-03

## 2021-02-12 NOTE — NURSING NOTE
Case Management Discharge Note      Final Note: Discharged home.    Provided Post Acute Provider List?: Refused  Refused Provider List Comment: Pt declined  Provided Post Acute Provider Quality & Resource List?: Refused  Refused Quality and Resource List Comment: Pt declined    Selected Continued Care - Discharged on 2/11/2021 Admission date: 2/9/2021 - Discharge disposition: Home or Self Care    Destination    No services have been selected for the patient.              Durable Medical Equipment    No services have been selected for the patient.              Dialysis/Infusion    No services have been selected for the patient.              Home Medical Care    No services have been selected for the patient.              Therapy    No services have been selected for the patient.              Community Resources    No services have been selected for the patient.                       Final Discharge Disposition Code: 01 - home or self-care

## 2021-02-12 NOTE — PROGRESS NOTES
Transitional Care Follow Up Visit  Subjective     Yovanny Solitario is a 45 y.o. female who presents for a transitional care management visit.    Within 48 business hours after discharge our office contacted her via telephone to coordinate her care and needs.      I reviewed and discussed the details of that call along with the discharge summary, hospital problems, inpatient lab results, inpatient diagnostic studies, and consultation reports with Yovanny.     Current outpatient and discharge medications have been reconciled for the patient.  Reviewed by: Tatyana Delgado MD      Date of TCM Phone Call 2/11/2021   [facility] [place] [facility]   Date of Admission 2/9/2021   Date of Discharge 2/11/2021   Discharge Disposition Home or Self Care     Risk for Readmission (LACE) Score: 6 (2/11/2021  6:00 AM)      History of Present Illness   Course During Hospital Stay:    Pt restarted her synjardi and trulicity last Saturday (6 days ago).  She had lots of diarrhea/nausea and couldn't control the nausea.  She took the once weekly shot and 3 doses of synjardi.  She still has lots of nausea.  She had previously tolerated synjardi and trulicity.  She went to the ED and was admitted with DKA.  She was changed to lantus and metformin at the time of d/c from the hospital.  I have communicated with Dr. Mcdaniels the hospitalist several times during the pt's hospitalization and at the time of discharge.  The pt is very motivated to try to get healthier.    She know that she doesn't always eat regularly.  She was told that she had gastroparesis in the past but has not had a gastric emptying study.      The following portions of the patient's history were reviewed and updated as appropriate: allergies, current medications, past family history, past medical history, past social history, past surgical history and problem list.    Review of Systems   Constitutional: Negative.    HENT: Negative.    Eyes: Negative.    Respiratory: Negative.     Cardiovascular: Negative.    Gastrointestinal: Positive for nausea.   Endocrine: Negative.    Genitourinary: Negative.    Musculoskeletal: Negative.    Skin: Negative.    Allergic/Immunologic: Negative.    Neurological: Negative.    Hematological: Negative.    Psychiatric/Behavioral: Negative.    All other systems reviewed and are negative.      Objective   Physical Exam  Vitals signs and nursing note reviewed.   Constitutional:       General: She is not in acute distress.     Appearance: She is well-developed.   HENT:      Head: Normocephalic and atraumatic.      Right Ear: External ear normal.      Left Ear: External ear normal.      Nose: Nose normal.   Eyes:      Conjunctiva/sclera: Conjunctivae normal.      Pupils: Pupils are equal, round, and reactive to light.   Neck:      Musculoskeletal: Normal range of motion and neck supple.   Cardiovascular:      Rate and Rhythm: Normal rate and regular rhythm.      Heart sounds: Normal heart sounds.   Pulmonary:      Effort: Pulmonary effort is normal. No respiratory distress.      Breath sounds: Normal breath sounds. No wheezing.   Musculoskeletal: Normal range of motion.      Comments: Normal gait   Skin:     General: Skin is warm and dry.   Neurological:      Mental Status: She is alert and oriented to person, place, and time.   Psychiatric:         Behavior: Behavior normal.         Thought Content: Thought content normal.         Judgment: Judgment normal.         Assessment/Plan   Diagnoses and all orders for this visit:    1. Hospital discharge follow-up    2. Uncontrolled type 2 diabetes mellitus with hyperglycemia (CMS/[facility]) (Primary) - will begin increasing lantus by 2 units daily for gasting glucoses >150.  Once pt reaches 30 units we will recheck.    -     Insulin Pen Needle (Pen Needles) 32G X 5 MM misc; 1 each Daily.  Dispense: 100 each; Refill: 3  -     NM gastric emptying; Future    3. Diabetic ketoacidosis without coma associated with type 2 diabetes  mellitus (CMS/HCC)    4. Nausea - Pt still with nausea.  She has been off the synjardi but the trulicity will be in her system another day or two.  One the trulicity wears off I think that her nausea will begin to improve.  She has also had a possible history of gastroparesis. She has an appt with GI on 3/1.  Will order gastric emptying study to be done before this appt.    -     NM gastric emptying; Future  -     omeprazole (PrilOSEC) 20 MG capsule; Take 1 capsule by mouth Daily.  Dispense: 30 capsule; Refill: 0    Recheck labs in 5 days.

## 2021-02-12 NOTE — OUTREACH NOTE
"Call Center TCM Note      Responses   [facility] patient discharged from?  [place]   Does the patient have one of the following disease processes/diagnoses(primary or secondary)?  Other   TCM attempt successful?  Yes   Call start time  1136   Call end time  1143   Discharge diagnosis  Diabetic acidosis without coma    Meds reviewed with patient/caregiver?  Yes   Is the patient having any side effects they believe may be caused by any medication additions or changes?  No   Does the patient have all medications ordered at discharge?  Yes   Is the patient taking all medications as directed (includes completed medication regime)?  Yes   Does the patient have a primary care provider?   Yes   Does the patient have an appointment with their PCP within 7 days of discharge?  Yes   Comments regarding PCP  Hospital d/c f/u appt is on 2/12/21 at 2:00 pm    Has the patient kept scheduled appointments due by today?  N/A   Psychosocial issues?  No   Did the patient receive a copy of their discharge instructions?  Yes   Nursing interventions  Reviewed instructions with patient   What is the patient's perception of their health status since discharge?  Worsening [Pt verbalizes she's frustrated as she's taking her meds correctly and she's checking her BS however her fasting BS this AM was 219 today. She's also nauseous. She says, \"I can't live in a hospital and I don't want to die.\" ]   Is the patient/caregiver able to teach back signs and symptoms related to disease process for when to call PCP?  Yes   Is the patient/caregiver able to teach back signs and symptoms related to disease process for when to call 911?  Yes   Is the patient/caregiver able to teach back the hierarchy of who to call/visit for symptoms/problems? PCP, Specialist, Home health nurse, Urgent Care, ED, 911  Yes   If the patient is a current smoker, are they able to teach back resources for cessation?  Not a smoker   TCM call completed?  Yes          Nadiya " JUSTIN Gallagher RN    2/12/2021, 11:43 EST

## 2021-02-12 NOTE — OUTREACH NOTE
Call Center TCM Note      Responses   [facility] patient discharged from?  [place]   Does the patient have one of the following disease processes/diagnoses(primary or secondary)?  Other   TCM attempt successful?  No [No one listed on verbal release]   Unsuccessful attempts  Attempt 1          Nadiya Gallagher RN    2/12/2021, 10:40 EST

## 2021-02-14 LAB
BACTERIA SPEC AEROBE CULT: NORMAL
BACTERIA SPEC AEROBE CULT: NORMAL

## 2021-02-14 NOTE — PAYOR COMM NOTE
"[facility]   &  [facility]  [address]    [address] [place] [address]    [place] [address]    Robert Lawrencebethanygenevieve  Utilization Review/Room Reservations  Phone: [phone], [phone] or [phone]  Fax: [phone]  Email: [email or web address]  Please call, fax back, or email with authorization or any questions! Thanks!      REQUESTED D/C SUMMARY  REF#[id number]        This fax contains any of the following:  Face Sheet, H&P, progress notes, consults, orders, meds, lab results, labor record, vitals, delivery worksheet, op note, d/c summary.  The information contained in this fax is confidential for the use of the Individual or entity named above. If the reader of this message is not the Intended recipient (or the employee or agent responsible to deliver it to the Intended recipient), you are hereby notified that any dissemination, distribution, or copy of this communication is prohibited. If you have received this communication in error, please notify us by collect telephone call and return the original message to us at the above address at our expense.  Yovanny Solitario (45 y.o. Female)     Date of Birth Social Security Number Address Home Phone MRN    1975  [address] [place] [id number] [phone] [id number]    [employer] Marital Status          None Single       Admission Date Admission Type Admitting Provider Attending Provider Department, Room/Bed    2/9/21 Emergency Enmanuel Mcdaniels MD  [facility] ICU, ICU6/1    Discharge Date Discharge Disposition Discharge Destination        2/11/2021 Home or Self Care              Attending Provider: (none)   Allergies: Morphine, Latex, Penicillins    Isolation: None   Infection: None   Code Status: Prior    Ht: 152.4 cm (60\")   Wt: 65 kg (143 lb 4.8 oz)    Admission Cmt: None   Principal Problem: None                Active Insurance as of 2/9/2021     " Primary Coverage     Payor Plan Insurance Group Employer/Plan Group    ANTH BLUE CROSS ANTH BLUE CROSS BLUE [facility] PPO [id number]     Payor Plan Address Payor Plan Phone Number Payor Plan Fax Number Effective Dates    [address] [phone]  1/1/2020 - None Entered    [facility] [id number]       Subscriber Name Subscriber Birth Date Member ID       KAVITA SOLITARIO 1975 [id number]                 Emergency Contacts      (Rel.) Home Phone Work Phone Mobile Phone    NADYA HINDS (Sister) [phone] -- --               Discharge Summary      Enmaunel Mcdaniels MD at 02/11/21 1042          Kavita Solitario  1975  [id number]    Hospitalists Discharge Summary    Date of Admission: 2/9/2021  Date of Discharge:  2/11/2021    Primary Discharge Diagnoses:  1.  Diabetic Ketoacidosis    History of Present Illness (taken from H&P):    Ms. Solitario is a pleasant, 44 y/o HF who presents w/ a 3-day history of continued nausea and vomiting after starting Synjardy and Trulicity on Saturday.  She reports she has been off of a diabetic regimen for almost a year after changing jobs.  She reports good glucose control in the past and in fact, she has been on Synjardy before w/o issue.  She reports shortly after taking her medicine, she became nauseated and had emesis.  She does not think she threw-up her medicine.  She denies blood in her emesis as well.  Anything she tried to take PO (Pedialyte) was rejected.  She denies abdominal pain.  Starting this morning, she began having non-bloody diarrhea.  She began to feel weak and was having chest pressure from throwing up so much.  She denies fever and chills, but staff note she was clammy when she arrived in the unit.  She denies sick contacts.  She does note some occasional burning and tingling in her toes and sometimes her fingers.    Hospital Course:  Ms. Solitario was admitted to the ICU under the DKA protocol.  Her gap closed and her acidosis resolved w/  therapy.  I called and spoke w/ Dr. Delgado concerning post-discharge therapy and further work-up.  Insulin antibodies and Glutamic Acid Decarboxylase antibodies were ordered.  I transitioned her back to Metformin and added Insulin glargine.  I also asked staff to make an appointment w/ Dr. Fritz for her because she was concerned about gastroparesis.  I explained to her that good glucose control was paramount to controlling those symptoms.  She expressed understanding.    PCP  Patient Care Team:  Tatyana Delgado MD as PCP - General (Internal Medicine & Pediatrics)    Consults:   Consults     No orders found from 1/11/2021 to 2/10/2021.          Operations and Procedures Performed:       Xr Chest 1 View    Result Date: 2/9/2021  Narrative: CHEST X-RAY, 02/09/2021     HISTORY: 45-year-old female in the ED complaining of 3 day history weakness, nausea and mid chest pain.  TECHNIQUE: AP portable upright chest x-ray.  FINDINGS: The lungs are expanded and clear. No visible focal or multifocal pulmonary infiltrate. No pleural effusion. Heart size and pulmonary vascularity are normal.      Impression: Negative chest.  This report was finalized on 2/9/2021 9:20 AM by Dr. Brandon Wyatt MD.        Allergies:  is allergic to morphine; latex; and penicillins.    Jah  reviewed    Discharge Medications:     Discharge Medications      New Medications      Instructions Start Date   Insulin Glargine 100 UNIT/ML injection pen  Commonly known as: LANTUS SOLOSTAR   10 Units, Subcutaneous, Nightly      metFORMIN 500 MG tablet  Commonly known as: GLUCOPHAGE   500 mg, Oral, 2 Times Daily With Meals         Continue These Medications      Instructions Start Date   Biotin 1000 MCG chewable tablet   Oral      cholecalciferol 25 MCG (1000 UT) tablet  Commonly known as: VITAMIN D3   2,000 Units, Oral, Daily      glucose blood test strip   Use as instructed      glucose monitor monitoring kit   1 each, Does not apply, As  Needed      Lancets misc   1 each, Does not apply, 2 Times Daily      multivitamin with minerals tablet tablet   Oral         Stop These Medications    Synjardy 12.5-1000 MG tablet  Generic drug: Empagliflozin-metFORMIN HCl     Trulicity 1.5 MG/0.5ML solution pen-injector  Generic drug: Dulaglutide            Last Lab Results:   Lab Results (most recent)     Procedure Component Value Units Date/Time    Blood Culture - Blood, Arm, Right [[id number]] Collected: 02/09/21 0922    Specimen: Blood from Arm, Right Updated: 02/11/21 0945     Blood Culture No growth at 2 days    Blood Culture - Blood, Arm, Right [[id number]] Collected: 02/09/21 0820    Specimen: Blood from Arm, Right Updated: 02/11/21 0901     Blood Culture No growth at 2 days    Phosphorus [[id number]]  (Normal) Collected: 02/11/21 0553    Specimen: Blood Updated: 02/11/21 0623     Phosphorus 3.0 mg/dL     Basic Metabolic Panel [[id number]]  (Abnormal) Collected: 02/11/21 0553    Specimen: Blood Updated: 02/11/21 0622     Glucose 166 mg/dL      BUN 2 mg/dL      Creatinine 0.40 mg/dL      Sodium 137 mmol/L      Potassium 3.6 mmol/L      Chloride 107 mmol/L      CO2 22.4 mmol/L      Calcium 7.7 mg/dL      eGFR  African Amer >150 mL/min/1.73      eGFR Non African Amer >150 mL/min/1.73      BUN/Creatinine Ratio 5.0     Anion Gap 7.6 mmol/L     Narrative:      GFR Normal >60  Chronic Kidney Disease <60  Kidney Failure <15      Magnesium [[id number]]  (Normal) Collected: 02/11/21 0553    Specimen: Blood Updated: 02/11/21 0622     Magnesium 2.1 mg/dL     CBC & Differential [[id number]]  (Normal) Collected: 02/11/21 0553    Specimen: Blood Updated: 02/11/21 0606    Narrative:      The following orders were created for panel order CBC & Differential.  Procedure                               Abnormality         Status                     ---------                               -----------         ------                     CBC Auto Differential[[id number]]        Normal               Final result                 Please view results for these tests on the individual orders.    CBC Auto Differential [[id number]]  (Normal) Collected: 02/11/21 0553    Specimen: Blood Updated: 02/11/21 0606     WBC 5.87 10*3/mm3      RBC 4.09 10*6/mm3      Hemoglobin 12.6 g/dL      Hematocrit 35.3 %      MCV 86.3 fL      MCH 30.8 pg      MCHC 35.7 g/dL      RDW 12.6 %      RDW-SD 39.4 fl      MPV 10.5 fL      Platelets 221 10*3/mm3      Neutrophil % 59.8 %      Lymphocyte % 29.8 %      Monocyte % 7.8 %      Eosinophil % 2.0 %      Basophil % 0.3 %      Immature Grans % 0.3 %      Neutrophils, Absolute 3.50 10*3/mm3      Lymphocytes, Absolute 1.75 10*3/mm3      Monocytes, Absolute 0.46 10*3/mm3      Eosinophils, Absolute 0.12 10*3/mm3      Basophils, Absolute 0.02 10*3/mm3      Immature Grans, Absolute 0.02 10*3/mm3      nRBC 0.0 /100 WBC     POC Glucose Once [[id number]]  (Abnormal) Collected: 02/10/21 2203    Specimen: Blood Updated: 02/10/21 2209     Glucose 162 mg/dL     Phosphorus [[id number]]  (Abnormal) Collected: 02/10/21 1704    Specimen: Blood Updated: 02/10/21 1735     Phosphorus 1.7 mg/dL     Basic Metabolic Panel [[id number]]  (Abnormal) Collected: 02/10/21 1704    Specimen: Blood Updated: 02/10/21 1734     Glucose 147 mg/dL      BUN 2 mg/dL      Creatinine 0.43 mg/dL      Sodium 140 mmol/L      Potassium 3.3 mmol/L      Comment: Slight hemolysis detected by analyzer. Results may be affected.        Chloride 114 mmol/L      CO2 18.8 mmol/L      Calcium 6.7 mg/dL      eGFR  African Amer >150 mL/min/1.73      eGFR Non African Amer >150 mL/min/1.73      BUN/Creatinine Ratio 4.7     Anion Gap 7.2 mmol/L     Narrative:      GFR Normal >60  Chronic Kidney Disease <60  Kidney Failure <15      Magnesium [[id number]]  (Normal) Collected: 02/10/21 1704    Specimen: Blood Updated: 02/10/21 1730     Magnesium 1.9 mg/dL     POC Glucose Once [[id number]]  (Normal) Collected: 02/10/21 4049    Specimen: Blood Updated:  02/10/21 1712     Glucose 111 mg/dL     Glutamic Acid Decarboxylase [[id number]] Collected: 02/10/21 1153    Specimen: Blood Updated: 02/10/21 1518    Blood Gas, Arterial - [[id number]] Collected: 02/10/21 0105    Specimen: Arterial Blood Updated: 02/10/21 1203     Site --     Comment: Incorrectly reported  Corrected result. Previous result was Nurse/Dr Draw on 2/10/2021 at 0107 EST.        Sarthak's Test --     Comment: Incorrectly reported  Corrected result. Previous result was N/A on 2/10/2021 at 0107 EST.        pH, Arterial --     Comment: Incorrectly reported  Corrected result. Previous result was 7.208 pH units on 2/10/2021 at 0107 EST.        pCO2, Arterial --     Comment: Incorrectly reported  Corrected result. Previous result was 35.7 mm Hg on 2/10/2021 at 0107 EST.        pO2, Arterial --     Comment: Incorrectly reported  Corrected result. Previous result was 31.1 mm Hg on 2/10/2021 at 0107 EST.        HCO3, Arterial --     Comment: Incorrectly reported  Corrected result. Previous result was 14.2 mmol/L on 2/10/2021 at 0107 EST.        Base Excess, Arterial --     Comment: Incorrectly reported  Corrected result. Previous result was -12.8 mmol/L on 2/10/2021 at 0107 EST.        O2 Saturation, Arterial --     Comment: Incorrectly reported  Corrected result. Previous result was 62.0 % on 2/10/2021 at 0107 EST.        Hemoglobin, Blood Gas --     Comment: Corrected result. Previous result was 13.6 g/dL on 2/10/2021 at 0107 EST.        Hematocrit, Blood Gas --     Comment: Incorrectly reported        Temperature --     Comment: Corrected result. Previous result was 37.0 C on 2/10/2021 at 0107 EST.        Barometric Pressure for Blood Gas --     Comment: Incorrectly reported  Corrected result. Previous result was 745 mmHg on 2/10/2021 at 0107 EST.        Modality --     Comment: Incorrectly reported  Corrected result. Previous result was Room Air on 2/10/2021 at 0107 EST.        Ventilator Mode --     Comment:  Meter: [id number]     :  [id number]  Corrected result. Previous result was NA on 2/10/2021 at 0107 EST.        Notified Who --     Comment: Corrected result. Previous result was helena rn on 2/10/2021 at 0107 EST.        Notified By --     Comment: Corrected result. Previous result was [id number] on 2/10/2021 at 0107 EST.        Notified Time --     Comment: Corrected result. Previous result was 02/10/2021 01:08 on 2/10/2021 at 0107 EST.        pCO2, Temperature Corrected --     Comment: Corrected result. Previous result was 35.7 mm Hg on 2/10/2021 at 0107 EST.        pH, Temp Corrected --     Comment: Corrected result. Previous result was 7.208 pH Units on 2/10/2021 at 0107 EST.        pO2, Temperature Corrected --     Comment: Corrected result. Previous result was 31.1 mm Hg on 2/10/2021 at 0107 EST.       Insulin Antibody [[id number]] Collected: 02/10/21 1153    Specimen: Blood Updated: 02/10/21 1154    Procalcitonin [[id number]]  (Normal) Collected: 02/10/21 0533    Specimen: Blood from Arm, Right Updated: 02/10/21 1121     Procalcitonin 0.08 ng/mL     Narrative:      Results may be falsely decreased if patient taking Biotin.     Blood Gas, Venous - [[id number]]  (Abnormal) Collected: 02/10/21 0105    Specimen: Venous Blood Updated: 02/10/21 1020     Site Nurse/Dr Draw     pH, Venous 7.208 pH Units      pCO2, Venous 35.7 mm Hg      pO2, Venous 31.1 mm Hg      HCO3, Venous 14.2 mmol/L      Base Excess, Venous -12.8 mmol/L      O2 Saturation, Venous 62.0 %      Hemoglobin, Blood Gas 13.6 g/dL      Barometric Pressure for Blood Gas 745 mmHg      Modality Room Air     Notified Who Helena ROMERO     Notified By [id number]     Notified Time 02/10/2021 0500     Collected by nurse    Comprehensive Metabolic Panel [[id number]]  (Abnormal) Collected: 02/10/21 0533    Specimen: Blood from Arm, Right Updated: 02/10/21 0623     Glucose 219 mg/dL      BUN 5 mg/dL      Creatinine 0.64 mg/dL      Sodium 135 mmol/L      Potassium 4.5  mmol/L      Comment: Slight hemolysis detected by analyzer. Results may be affected.        Chloride 110 mmol/L      CO2 15.8 mmol/L      Calcium 7.2 mg/dL      Total Protein 5.7 g/dL      Albumin 3.40 g/dL      ALT (SGPT) 17 U/L      AST (SGOT) 15 U/L      Comment: Slight hemolysis detected by analyzer. Results may be affected.        Alkaline Phosphatase 84 U/L      Total Bilirubin 0.9 mg/dL      eGFR Non African Amer 101 mL/min/1.73      eGFR  African Amer 123 mL/min/1.73      Globulin 2.3 gm/dL      A/G Ratio 1.5 g/dL      BUN/Creatinine Ratio 7.8     Anion Gap 9.2 mmol/L     Narrative:      GFR Normal >60  Chronic Kidney Disease <60  Kidney Failure <15      CBC & Differential [[id number]]  (Abnormal) Collected: 02/10/21 0533    Specimen: Blood from Arm, Right Updated: 02/10/21 0603    Narrative:      The following orders were created for panel order CBC & Differential.  Procedure                               Abnormality         Status                     ---------                               -----------         ------                     CBC Auto Differential[[id number]]        Abnormal            Final result                 Please view results for these tests on the individual orders.    CBC Auto Differential [[id number]]  (Abnormal) Collected: 02/10/21 0533    Specimen: Blood from Arm, Right Updated: 02/10/21 0603     WBC 14.27 10*3/mm3      RBC 4.14 10*6/mm3      Hemoglobin 12.8 g/dL      Hematocrit 36.3 %      MCV 87.7 fL      MCH 30.9 pg      MCHC 35.3 g/dL      RDW 12.7 %      RDW-SD 40.7 fl      MPV 11.4 fL      Platelets 249 10*3/mm3      Neutrophil % 78.8 %      Lymphocyte % 13.3 %      Monocyte % 6.8 %      Eosinophil % 0.3 %      Basophil % 0.3 %      Immature Grans % 0.5 %      Neutrophils, Absolute 11.25 10*3/mm3      Lymphocytes, Absolute 1.90 10*3/mm3      Monocytes, Absolute 0.97 10*3/mm3      Eosinophils, Absolute 0.04 10*3/mm3      Basophils, Absolute 0.04 10*3/mm3      Immature Grans,  Absolute 0.07 10*3/mm3      nRBC 0.0 /100 WBC     Troponin [[id number]]  (Normal) Collected: 02/09/21 1210    Specimen: Blood Updated: 02/09/21 1526     Troponin T <0.010 ng/mL     Narrative:      Troponin T Reference Range:  <= 0.03 ng/mL-   Negative for AMI  >0.03 ng/mL-     Abnormal for myocardial necrosis.  Clinicians would have to utilize clinical acumen, EKG, Troponin and serial changes to determine if it is an Acute Myocardial Infarction or myocardial injury due to an underlying chronic condition.       Results may be falsely decreased if patient taking Biotin.      TSH [[id number]]  (Normal) Collected: 02/09/21 1210    Specimen: Blood Updated: 02/09/21 1508     TSH 0.516 uIU/mL     Urine Drug Screen - Urine, Clean Catch [[id number]]  (Normal) Collected: 02/09/21 0834    Specimen: Urine, Clean Catch Updated: 02/09/21 1500     THC, Screen, Urine Negative     Phencyclidine (PCP), Urine Negative     Cocaine Screen, Urine Negative     Methamphetamine, Ur Negative     Opiate Screen Negative     Amphetamine Screen, Urine Negative     Benzodiazepine Screen, Urine Negative     Tricyclic Antidepressants Screen Negative     Methadone Screen, Urine Negative     Barbiturates Screen, Urine Negative     Oxycodone Screen, Urine Negative     Propoxyphene Screen Negative     Buprenorphine, Screen, Urine Negative    Narrative:      Urine drug screen results are to be used for medical purposes only.  They are not to be used for legal purposes such as employment testing.  Negative results do not necessarily mean the complete absence of a subtance, but rather that the result is less than the cutoff for that substance.  Positive results are unconfirmed and considered Preliminary Positive.  [facility] does not automatically confirm Postitive Unconfirmed results.  The physician may request (order) an Unconfirmed Positive result to be sent out for confirmation.      Negative Thresholds for Drugs Screened:    THC screen,  urine                          50 ng/ml  Phenycyclidine (PCP), urine                25 ng/ml  Cocaine screen, urine                     150 ng/ml  Methamphetamine, urine                    500 ng/ml  Opiate screen, urine                      100 ng/ml  Amphetamine screen, urine                 500 ng/ml  Benzodiazepine screen, urine              150 ng/ml  Tricyclic Antidepressants screen, urine   300 ng/ml  Methadone screen, urine                   200 ng/ml  Barbiturates screen, urine                200 ng/ml  Oxycodone screen, urine                   100 ng/ml  Propoxyphene screen, urine                300 ng/ml  Buprenorphine screen, urine                10 ng/ml    Lactic Acid, Reflex [[id number]]  (Abnormal) Collected: 02/09/21 1211    Specimen: Blood Updated: 02/09/21 1237     Lactate 2.1 mmol/L     Lactic Acid, Reflex Timer (This will reflex a repeat order 3-3:15 hours after ordered.) [[id number]] Collected: 02/09/21 0815    Specimen: Blood Updated: 02/09/21 1146     Hold Tube Hold for add-ons.     Comment: Auto resulted.       Troponin [[id number]]  (Normal) Collected: 02/09/21 0741    Specimen: Blood Updated: 02/09/21 1038     Troponin T <0.010 ng/mL     Narrative:      Troponin T Reference Range:  <= 0.03 ng/mL-   Negative for AMI  >0.03 ng/mL-     Abnormal for myocardial necrosis.  Clinicians would have to utilize clinical acumen, EKG, Troponin and serial changes to determine if it is an Acute Myocardial Infarction or myocardial injury due to an underlying chronic condition.       Results may be falsely decreased if patient taking Biotin.      Hemoglobin A1c [[id number]]  (Abnormal) Collected: 02/09/21 0741    Specimen: Blood Updated: 02/09/21 1038     Hemoglobin A1C 10.60 %     Narrative:      Hemoglobin A1C Ranges:    Increased Risk for Diabetes  5.7% to 6.4%  Diabetes                     >= 6.5%  Diabetic Goal                < 7.0%    COVID PRE-OP / PRE-PROCEDURE SCREENING ORDER (NO ISOLATION) - Swab,  Nasal Cavity [[id number]]  (Normal) Collected: 02/09/21 0837    Specimen: Swab from Nasal Cavity Updated: 02/09/21 0924    Narrative:      The following orders were created for panel order COVID PRE-OP / PRE-PROCEDURE SCREENING ORDER (NO ISOLATION) - Swab, Nasal Cavity.  Procedure                               Abnormality         Status                     ---------                               -----------         ------                     COVID-19,[facility] [place]...[[id number]]  Normal              Final result                 Please view results for these tests on the individual orders.    COVID-19,[facility] [employer] IN-HOUSE,Nasal Swab No Transport Media 3-4 HR TAT - Swab, Nasal Cavity [[id number]]  (Normal) Collected: 02/09/21 0837    Specimen: Swab from Nasal Cavity Updated: 02/09/21 0924     COVID19 Not Detected    Narrative:      Fact sheet for providers: https://www.fda.gov/media/278209/download     Fact sheet for patients: https://www.fda.gov/media/822989/download    Test performed by PCR.    Consider negative results in combination with clinical observations, patient history, and epidemiological information.    Blood Gas, Venous - [[id number]]  (Abnormal) Collected: 02/09/21 0845    Specimen: Venous Blood Updated: 02/09/21 0857     Site OTHER     pH, Venous 7.015 pH Units      Comment: 85 Value below critical limit        pCO2, Venous 41.7 mm Hg      pO2, Venous 35.0 mm Hg      HCO3, Venous 10.6 mmol/L      Comment: 84 Value below reference range        Base Excess, Venous -20.2 mmol/L      Comment: 84 Value below reference range        O2 Saturation, Venous 54.1 %      Hemoglobin, Blood Gas 17.5 g/dL      Temperature 37.0 C      Barometric Pressure for Blood Gas 743 mmHg      Modality Room Air     Ventilator Mode NA     Comment: Meter: [id number]     :  [id number]        Notified Who RB AND ROSA RICH RN AT 0855     Notified By [id number]     Notified Time 02/09/2021 08:56    Urinalysis With Culture If  Indicated - Urine, Clean Catch [[id number]]  (Abnormal) Collected: 02/09/21 0834    Specimen: Urine, Clean Catch Updated: 02/09/21 0856     Color, UA Yellow     Appearance, UA Clear     pH, UA <=5.0     Specific Gravity, UA 1.025     Glucose,  mg/dL (2+)     Ketones, UA >=160 mg/dL (4+)     Bilirubin, UA Negative     Blood, UA Negative     Protein, UA Trace     Leuk Esterase, UA Negative     Nitrite, UA Negative     Urobilinogen, UA 0.2 E.U./dL    Narrative:      Urine microscopic not indicated.    Lactic Acid, Plasma [[id number]]  (Abnormal) Collected: 02/09/21 0815    Specimen: Blood Updated: 02/09/21 0842     Lactate 3.3 mmol/L     Procalcitonin [[id number]]  (Normal) Collected: 02/09/21 0741    Specimen: Blood Updated: 02/09/21 0837     Procalcitonin 0.09 ng/mL     Narrative:      Results may be falsely decreased if patient taking Biotin.     Comprehensive Metabolic Panel [[id number]]  (Abnormal) Collected: 02/09/21 0741    Specimen: Blood Updated: 02/09/21 0807     Glucose 326 mg/dL      BUN 18 mg/dL      Creatinine 1.06 mg/dL      Sodium 138 mmol/L      Potassium 4.0 mmol/L      Chloride 96 mmol/L      CO2 11.4 mmol/L      Calcium 9.9 mg/dL      Total Protein 9.3 g/dL      Albumin 5.50 g/dL      ALT (SGPT) 38 U/L      AST (SGOT) 17 U/L      Alkaline Phosphatase 160 U/L      Total Bilirubin 1.2 mg/dL      eGFR Non African Amer 57 mL/min/1.73      eGFR  African Amer 69 mL/min/1.73      Globulin 3.8 gm/dL      A/G Ratio 1.4 g/dL      BUN/Creatinine Ratio 17.0     Anion Gap 30.6 mmol/L     Narrative:      GFR Normal >60  Chronic Kidney Disease <60  Kidney Failure <15      hCG, Serum, Qualitative [[id number]]  (Normal) Collected: 02/09/21 0741    Specimen: Blood Updated: 02/09/21 0758     HCG Qualitative Negative        Imaging Results (Most Recent)     Procedure Component Value Units Date/Time    XR Chest 1 View [[id number]] Collected: 02/09/21 0919     Updated: 02/09/21 0922    Narrative:      CHEST X-RAY,  02/09/2021         HISTORY:  45-year-old female in the ED complaining of 3 day history weakness,  nausea and mid chest pain.     TECHNIQUE:  AP portable upright chest x-ray.     FINDINGS:  The lungs are expanded and clear. No visible focal or multifocal  pulmonary infiltrate. No pleural effusion. Heart size and pulmonary  vascularity are normal.       Impression:      Negative chest.     This report was finalized on 2/9/2021 9:20 AM by Dr. Brandon Wyatt MD.             PROCEDURES      Condition on Discharge:  Stable    Physical Exam at Discharge  Vital Signs  Temp:  [98.2 °F (36.8 °C)-98.7 °F (37.1 °C)] 98.2 °F (36.8 °C)  Heart Rate:  [] 94  Resp:  [18-20] 18  BP: ()/(67-87) 108/70    Physical Exam:  Physical Exam   Constitutional: Patient appears well-developed and well-nourished and in no acute distress   Cardiovascular: Regular rate, regular rhythm, S1 normal and S2 normal.  Exam reveals no gallop and no friction rub.  No murmur heard.  Pulmonary/Chest: Lungs are clear to auscultation bilaterally. No respiratory distress. No wheezes. No rhonchi. No rales.   Abdominal: Soft. Bowel sounds are normal. There is no tenderness.   Musculoskeletal: Normal Muscle tone  Extremities: No edema. No asymmetry.  Neurological: Cranial nerves II-XII are grossly intact with no focal deficits.    Discharge Disposition  Home    Visiting Nurse:    No     Home PT/OT:  No     Home Safety Evaluation:  No     DME  None    Discharge Diet:      Dietary Orders (From admission, onward)     Start     Ordered    02/10/21 1746  Diet Regular; Consistent Carbohydrate  Diet Effective Now     Question Answer Comment   Diet Texture / Consistency Regular    Common Modifiers Consistent Carbohydrate        02/10/21 1745                Activity at Discharge:  As tolerated    Follow-up Appointments  No future appointments.  Additional Instructions for the Follow-ups that You Need to Schedule     Discharge Follow-up with PCP   As  directed       Currently Documented PCP:    Tatyana Delgado MD    PCP Phone Number:    [phone]     Follow Up Details: 1-2 weeks         Discharge Follow-up with Specified Provider: Dr. Fritz; 2 Weeks   As directed      To: Dr. Fritz    Follow Up: 2 Weeks               Test Results Pending at Discharge  Pending Labs     Order Current Status    Glutamic Acid Decarboxylase In process    Insulin Antibody In process    Blood Culture - Blood, Arm, Right Preliminary result    Blood Culture - Blood, Arm, Right Preliminary result           Enmanuel Mcdaniels MD  02/11/21  10:42 EST    Time: <30 minutes    Electronically signed by Enmanuel Mcdaniels MD at 02/13/21 3367

## 2021-02-18 ENCOUNTER — TELEPHONE (OUTPATIENT)
Dept: INTERNAL MEDICINE | Facility: CLINIC | Age: 46
End: 2021-02-18

## 2021-02-18 LAB — INSULIN AB SER-ACNC: <5 UU/ML

## 2021-02-19 ENCOUNTER — HOSPITAL ENCOUNTER (OUTPATIENT)
Dept: DIABETES SERVICES | Facility: HOSPITAL | Age: 46
Discharge: HOME | End: 2021-02-19
Admitting: INTERNAL MEDICINE

## 2021-02-19 DIAGNOSIS — E11.65 UNCONTROLLED TYPE 2 DIABETES MELLITUS WITH HYPERGLYCEMIA (HCC): Primary | ICD-10-CM

## 2021-02-19 PROCEDURE — G0108 DIAB MANAGE TRN  PER INDIV: HCPCS

## 2021-02-26 ENCOUNTER — HOSPITAL ENCOUNTER (OUTPATIENT)
Dept: NUCLEAR MEDICINE | Facility: HOSPITAL | Age: 46
Discharge: HOME | End: 2021-02-26

## 2021-02-26 DIAGNOSIS — E11.65 UNCONTROLLED TYPE 2 DIABETES MELLITUS WITH HYPERGLYCEMIA (HCC): ICD-10-CM

## 2021-02-26 DIAGNOSIS — R11.0 NAUSEA: ICD-10-CM

## 2021-02-26 PROCEDURE — A9541 TC99M SULFUR COLLOID: HCPCS | Performed by: INTERNAL MEDICINE

## 2021-02-26 PROCEDURE — 78264 GASTRIC EMPTYING IMG STUDY: CPT

## 2021-02-26 PROCEDURE — 0 TECHNETIUM SULFUR COLLOID: Performed by: INTERNAL MEDICINE

## 2021-02-26 RX ADMIN — TECHNETIUM TC 99M SULFUR COLLOID 1 DOSE: KIT at 11:11

## 2021-03-01 ENCOUNTER — OFFICE VISIT (OUTPATIENT)
Dept: GASTROENTEROLOGY | Facility: CLINIC | Age: 46
End: 2021-03-01

## 2021-03-01 VITALS — BODY MASS INDEX: 28.58 KG/M2 | TEMPERATURE: 98.1 F | WEIGHT: 145.6 LBS | HEIGHT: 60 IN

## 2021-03-01 DIAGNOSIS — N73.6 FEMALE PELVIC PERITONEAL ADHESIONS: ICD-10-CM

## 2021-03-01 DIAGNOSIS — R10.13 DYSPEPSIA: Primary | ICD-10-CM

## 2021-03-01 DIAGNOSIS — Z83.71 FAMILY HISTORY OF POLYPS IN THE COLON: ICD-10-CM

## 2021-03-01 PROCEDURE — 99204 OFFICE O/P NEW MOD 45 MIN: CPT | Performed by: INTERNAL MEDICINE

## 2021-03-01 RX ORDER — OMEPRAZOLE 40 MG/1
40 CAPSULE, DELAYED RELEASE ORAL DAILY
Qty: 90 CAPSULE | Refills: 3 | Status: SHIPPED | OUTPATIENT
Start: 2021-03-01 | End: 2021-07-22 | Stop reason: SDUPTHER

## 2021-03-01 NOTE — PROGRESS NOTES
PATIENT INFORMATION  Yovanny Solitario       - 1975    CHIEF COMPLAINT  Chief Complaint   Patient presents with   • Diarrhea     hospital f/u on diarrhea; vomiting       HISTORY OF PRESENT ILLNESS  Post prandial bloating and gas and her Bowels 3-8 a day and foul and cant trust her flatus    Nl GES and has only been on Prilosec 20 fro 2 weeks and 6 weeks total including her fathers meds she was borrowing    Does recall an ERCP and Stent around the time her GB was removed in [place]-24 years ago    Last Abx were 2019. Does eat yogurt 4/7 days a week      REVIEWED PERTINENT RESULTS/ LABS  No results found for: CASEREPORT, FINALDX  Lab Results   Component Value Date    HGB 12.6 2021    MCV 86.3 2021     2021    ALT 17 02/10/2021    AST 15 02/10/2021    HGBA1C 10.60 (H) 2021    TRIG 295 (H) 2021      Nm Gastric Emptying    Result Date: 2021  Narrative: RADIONUCLIDE SOLID GASTRIC EMPTYING STUDY, 2021  HISTORY: 45-year-old female with clinical suspicion for gastroparesis. Nausea vomiting and early cytology symptoms one year.  DOSE: 565 uCi technetium labeled sulfur colloid cooked into solid scrambled egg administered in protocol meal.  TECHNIQUE: Anterior and posterior abdominal images were obtained every 15 minutes for 61 minutes. Measured gastric activity was utilized to calculate solid gastric emptying statistics.  COMPARISON: *  None available  FINDINGS: Continuous solid gastric emptying was demonstrated throughout the exam. Normal emptying half-time was reached at 45 minutes (normal solid emptying half-time is less than 90 minutes using this technique). At the end of the procedure, 59% of initial activity had emptied. There is no evidence of significantly delayed solid gastric emptying.      Impression: 1. No evidence of delayed solid gastric emptying. 2. Normal solid emptying half-time of 45 minutes.  This report was finalized on 2021 12:36 PM by   Karsten Cesar MD.      Xr Chest 1 View    Result Date: 2021  Narrative: CHEST X-RAY, 2021     HISTORY: 45-year-old female in the ED complaining of 3 day history weakness, nausea and mid chest pain.  TECHNIQUE: AP portable upright chest x-ray.  FINDINGS: The lungs are expanded and clear. No visible focal or multifocal pulmonary infiltrate. No pleural effusion. Heart size and pulmonary vascularity are normal.      Impression: Negative chest.  This report was finalized on 2021 9:20 AM by Dr. Brandon Wyatt MD.        REVIEW OF SYSTEMS  Review of Systems   All other systems reviewed and are negative.        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]         PAST MEDICAL HISTORY  Past Medical History:   Diagnosis Date   • Diabetes mellitus (CMS/HCC)          SURGICAL HISTORY  Past Surgical History:   Procedure Laterality Date   • BREAST SURGERY     •  SECTION     • CHOLECYSTECTOMY     • SUBTOTAL HYSTERECTOMY           FAMILY HISTORY  Family History   Problem Relation Age of Onset   • Diabetes Mother    • Hyperlipidemia Mother    • Hypertension Mother    • Brain cancer Father    • Heart disease Maternal Grandmother    • Hyperlipidemia Maternal Grandmother    • Diabetes Maternal Grandmother    • Hypertension Maternal Grandmother          SOCIAL HISTORY  Social History     Occupational History   • Not on file   Tobacco Use   • Smoking status: Light Tobacco Smoker     Types: Cigarettes   • Smokeless tobacco: Never Used   Substance and Sexual Activity   • Alcohol use: Yes   • Drug use: Not Currently   • Sexual activity: Not Currently         CURRENT MEDICATIONS    Current Outpatient Medications:   •  Biotin 1000 MCG chewable tablet, Chew., Disp: , Rfl:   •  cetirizine (zyrTEC) 10 MG tablet, , Disp: , Rfl:   •  cholecalciferol (VITAMIN D3) 25 MCG (1000 UT) tablet, Take 2,000 Units  "by mouth Daily., Disp: , Rfl:   •  fluticasone (FLONASE) 50 MCG/ACT nasal spray, , Disp: , Rfl:   •  glucose blood test strip, Use as instructed, Disp: 200 each, Rfl: 12  •  glucose monitor monitoring kit, 1 each As Needed (Diabetes 2)., Disp: 1 each, Rfl: 0  •  Insulin Glargine (LANTUS SOLOSTAR) 100 UNIT/ML injection pen, Inject 10 Units under the skin into the appropriate area as directed Every Night for 30 days., Disp: 5 pen, Rfl: 0  •  Insulin Pen Needle (Pen Needles) 32G X 5 MM misc, 1 each Daily., Disp: 100 each, Rfl: 3  •  Lancets misc, 1 each 2 (Two) Times a Day., Disp: 200 each, Rfl: 5  •  metFORMIN (GLUCOPHAGE) 500 MG tablet, Take 1 tablet by mouth 2 (Two) Times a Day With Meals for 30 days., Disp: 60 tablet, Rfl: 0  •  Multiple Vitamins-Minerals (MULTIVITAMIN ADULTS PO), Take  by mouth., Disp: , Rfl:   •  omeprazole (PrilOSEC) 20 MG capsule, Take 1 capsule by mouth Daily., Disp: 30 capsule, Rfl: 0  •  omeprazole (priLOSEC) 40 MG capsule, Take 1 capsule by mouth Daily., Disp: 90 capsule, Rfl: 3  •  ondansetron (ZOFRAN) 8 MG tablet, Take 1 tablet by mouth Every 8 (Eight) Hours As Needed for Nausea or Vomiting., Disp: 20 tablet, Rfl: 1    ALLERGIES  Morphine, Latex, and Penicillins    VITALS  Vitals:    03/01/21 1259   Temp: 98.1 °F (36.7 °C)   TempSrc: Temporal   Weight: 66 kg (145 lb 9.6 oz)   Height: 152.4 cm (60\")       PHYSICAL EXAM  Debilities/Disabilities Identified: None  Emotional Behavior: Appropriate  Wt Readings from Last 3 Encounters:   03/01/21 66 kg (145 lb 9.6 oz)   02/12/21 64.6 kg (142 lb 6.4 oz)   02/11/21 65 kg (143 lb 4.8 oz)     Ht Readings from Last 1 Encounters:   03/01/21 152.4 cm (60\")     Body mass index is 28.44 kg/m².  Physical Exam  Constitutional:       Appearance: She is well-developed. She is not diaphoretic.   HENT:      Head: Normocephalic and atraumatic.   Eyes:      General: No scleral icterus.     Conjunctiva/sclera: Conjunctivae normal.      Pupils: Pupils are equal, " round, and reactive to light.   Neck:      Musculoskeletal: Normal range of motion and neck supple.      Thyroid: No thyromegaly.   Cardiovascular:      Rate and Rhythm: Normal rate and regular rhythm.      Heart sounds: Normal heart sounds. No murmur. No gallop.    Pulmonary:      Effort: Pulmonary effort is normal.      Breath sounds: Normal breath sounds. No wheezing or rales.   Abdominal:      General: Bowel sounds are normal. There is no distension or abdominal bruit.      Palpations: Abdomen is soft. There is no shifting dullness, fluid wave or mass.      Tenderness: There is abdominal tenderness in the right upper quadrant, right lower quadrant, epigastric area, periumbilical area and left upper quadrant. There is no guarding. Negative signs include Hilliard's sign.      Hernia: There is no hernia in the ventral area.   Musculoskeletal: Normal range of motion.   Lymphadenopathy:      Cervical: No cervical adenopathy.   Skin:     General: Skin is warm and dry.      Findings: No erythema or rash.   Neurological:      Mental Status: She is alert and oriented to person, place, and time.   Psychiatric:         Mood and Affect: Mood normal.         Behavior: Behavior normal.         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  -     Follow Anesthesia Guidelines / Protocol; Future  -     Obtain Informed Consent; Standing          PLAN  Will check EGD first but am inclined to treat for SIBO after that    Return in  about 3 months (around 6/1/2021).    I have discussed the above plan with the patient.  They verbalize understanding and are in agreement with the plan.  They have been advised to contact the office for any questions, concerns, or changes related to their health.

## 2021-03-02 PROBLEM — R10.13 DYSPEPSIA: Status: ACTIVE | Noted: 2021-03-02

## 2021-03-30 DIAGNOSIS — Z79.4 TYPE 2 DIABETES MELLITUS WITH HYPERGLYCEMIA, WITH LONG-TERM CURRENT USE OF INSULIN (HCC): ICD-10-CM

## 2021-03-30 DIAGNOSIS — E11.65 UNCONTROLLED TYPE 2 DIABETES MELLITUS WITH HYPERGLYCEMIA (HCC): ICD-10-CM

## 2021-03-30 DIAGNOSIS — E11.65 TYPE 2 DIABETES MELLITUS WITH HYPERGLYCEMIA, WITH LONG-TERM CURRENT USE OF INSULIN (HCC): ICD-10-CM

## 2021-03-30 RX ORDER — DULAGLUTIDE 1.5 MG/.5ML
INJECTION, SOLUTION SUBCUTANEOUS
Qty: 4 ML | Refills: 0 | Status: SHIPPED | OUTPATIENT
Start: 2021-03-30 | End: 2021-04-23 | Stop reason: SDUPTHER

## 2021-04-12 RX ORDER — ONDANSETRON HYDROCHLORIDE 8 MG/1
TABLET, FILM COATED ORAL
Qty: 20 TABLET | Refills: 0 | Status: SHIPPED | OUTPATIENT
Start: 2021-04-12 | End: 2021-04-14 | Stop reason: SDUPTHER

## 2021-04-14 ENCOUNTER — TELEPHONE (OUTPATIENT)
Dept: INTERNAL MEDICINE | Facility: CLINIC | Age: 46
End: 2021-04-14

## 2021-04-16 RX ORDER — ONDANSETRON HYDROCHLORIDE 8 MG/1
8 TABLET, FILM COATED ORAL EVERY 8 HOURS PRN
Qty: 20 TABLET | Refills: 0 | Status: SHIPPED | OUTPATIENT
Start: 2021-04-16 | End: 2021-04-30 | Stop reason: SDUPTHER

## 2021-04-23 ENCOUNTER — TELEPHONE (OUTPATIENT)
Dept: INTERNAL MEDICINE | Facility: CLINIC | Age: 46
End: 2021-04-23

## 2021-04-23 DIAGNOSIS — E11.65 UNCONTROLLED TYPE 2 DIABETES MELLITUS WITH HYPERGLYCEMIA (HCC): ICD-10-CM

## 2021-04-23 DIAGNOSIS — E11.65 TYPE 2 DIABETES MELLITUS WITH HYPERGLYCEMIA, WITH LONG-TERM CURRENT USE OF INSULIN (HCC): ICD-10-CM

## 2021-04-23 DIAGNOSIS — Z79.4 TYPE 2 DIABETES MELLITUS WITH HYPERGLYCEMIA, WITH LONG-TERM CURRENT USE OF INSULIN (HCC): ICD-10-CM

## 2021-04-23 RX ORDER — DULAGLUTIDE 1.5 MG/.5ML
1.5 INJECTION, SOLUTION SUBCUTANEOUS
Qty: 4 PEN | Refills: 5 | Status: SHIPPED | OUTPATIENT
Start: 2021-04-23 | End: 2021-08-04 | Stop reason: SDUPTHER

## 2021-04-23 NOTE — TELEPHONE ENCOUNTER
Trulicity 1.5 MG/0.5ML solution pen-injector  Patient is completely out and needs refills added to script.  Needs to  from [facility] today please.

## 2021-04-26 RX ORDER — DULAGLUTIDE 1.5 MG/.5ML
INJECTION, SOLUTION SUBCUTANEOUS
Qty: 4 ML | Refills: 0 | OUTPATIENT
Start: 2021-04-26

## 2021-05-01 RX ORDER — ONDANSETRON HYDROCHLORIDE 8 MG/1
8 TABLET, FILM COATED ORAL EVERY 8 HOURS PRN
Qty: 20 TABLET | Refills: 0 | Status: SHIPPED | OUTPATIENT
Start: 2021-05-01 | End: 2021-07-22 | Stop reason: SDUPTHER

## 2021-06-03 ENCOUNTER — OFFICE VISIT (OUTPATIENT)
Dept: GASTROENTEROLOGY | Facility: CLINIC | Age: 46
End: 2021-06-03

## 2021-06-03 VITALS — HEIGHT: 60 IN | BODY MASS INDEX: 29.45 KG/M2 | WEIGHT: 150 LBS

## 2021-06-03 DIAGNOSIS — K58.2 IRRITABLE BOWEL SYNDROME WITH BOTH CONSTIPATION AND DIARRHEA: ICD-10-CM

## 2021-06-03 DIAGNOSIS — R68.81 EARLY SATIETY: ICD-10-CM

## 2021-06-03 DIAGNOSIS — N73.6 FEMALE PELVIC PERITONEAL ADHESIONS: ICD-10-CM

## 2021-06-03 DIAGNOSIS — R14.0 ABDOMINAL BLOATING: ICD-10-CM

## 2021-06-03 DIAGNOSIS — R10.13 DYSPEPSIA: Primary | ICD-10-CM

## 2021-06-03 DIAGNOSIS — Z83.71 FAMILY HISTORY OF POLYPS IN THE COLON: ICD-10-CM

## 2021-06-03 DIAGNOSIS — R19.7 DIARRHEA, UNSPECIFIED TYPE: ICD-10-CM

## 2021-06-03 PROCEDURE — 99214 OFFICE O/P EST MOD 30 MIN: CPT | Performed by: NURSE PRACTITIONER

## 2021-06-03 RX ORDER — AMITRIPTYLINE HYDROCHLORIDE 25 MG/1
25 TABLET, FILM COATED ORAL NIGHTLY
Qty: 90 TABLET | Refills: 3 | Status: SHIPPED | OUTPATIENT
Start: 2021-06-03 | End: 2021-11-23

## 2021-06-03 NOTE — PATIENT INSTRUCTIONS
"EGD scheduled 7/9/21    Will go ahead and order the GES: had 2 hr GES and was normal but symptoms indicate need to check 4 hr study depending on what Dr. Fritz finds with her EGD.     Discussed the importance of taking Omeprazole/Prilosec, 40mg daily before breakfast permanently due to known risk with long term acid reflux of Flores's esophagus/esophageal cancer.   amitriptyline (ELAVIL) 25 MG tablet; Take 1 tablet by mouth Every Night.  daily probiotic   high fiber diet with 25-30gm a day  simethicone xtra strength gas x (125-180mg) 2 twice a day as needed for gas.    Low Acid Diet Instructions:   Don't eat late, don't eat fried or spicy, and don't eat too much at one time. Avoid alcohol and  tomato based products like pizza, lasagna, spaghetti.  If you want to have these take an over the counter Pepcid or TUMS immediately before you eat them.  Do not eat within 3-4 hrs of bedtime. Limit caffeine and carbonated beverages, if you must have them do not have later in the day.  If reflux is severe elevate the head of the bed. You may also use TUMS, maalox, or mylanta for breakthrough heartburn.    Take a daily probiotic (something with a billion cultures and more different strains is better, examples like \"Probiotic 10\" or probiotic gummies) for your gut as well.  AVOID taking NSAIDS (like ibuprofen, Aleve, Motrin, naproxen, meloxicam, etc) as much as possible and use acetaminophen (Tylenol) instead.    Drink lots of water, eat a high fiber diet with 25-30gm a day(check the fiber content in the foods you eat.  Protein bars with 15gm of fiber in each bar are a great supplement daily), and get regular exercise. Use over the counter simethicone xtra strength gas x (125-180mg) 2 twice a day as needed for gas.     High Fiber Food suggestions:  Beans, nuts, vegetables, whole grains, flax seed and jered seeds (which can be stirred into other food) are high in fiber.    N[place]and Protein bars from [employer] = 10gm of fiber in " each bar (also gluten free)  Quest Protein bars from grocery stores [employer], [employer], [employer]= 15gm of fiber each (also gluten free)  Fiber One bars from grocery stores = 5-6gm of fiber each  Kelloggs Bran Buds cereal 1/2 cup = 17gm of fiber  [employer] brand low sugar Craisins = 13gm of fiber per serving  Melalueca Fiberwise powder=15gm fiber per serving (2 scoops)  CarbBalance tortillas= 15gm of fiber each  Natural Ovens Keto-Friendly Bread found at [facility]=12gm fiber per serving  No Ifeanyi vegan bar at [facility]=15gm fiber per serving

## 2021-06-03 NOTE — PROGRESS NOTES
PATIENT INFORMATION  Yovanny Solitario     - 1975    CHIEF COMPLAINT  Chief Complaint   Patient presents with   • dyspepsia       HISTORY OF PRESENT ILLNESS  Post prandial bloating and gas and her Bowels 3-8 a day and foul and cant trust her flatus     Nl GES and has only been on Prilosec 20 fro 2 weeks and 6 weeks total including her fathers meds she was borrowing     Does recall an ERCP and Stent around the time her GB was removed in [place]-24 years ago     Last Abx were 2019. Does eat yogurt 4/7 days a week    Will check EGD first but am inclined to treat for SIBO after that     Return in about 3 months (around 2021).    Today:  has stomach issues and digestion, worse since she lost most of her teeth and has chewing issues.  24 yrs now having digestion issues since her sydni.  bm 3-5 a day.  DM is out of control.  is on insulin and metformin that have made it worse.  a week ago had an entire week of diarrhea.    2020 hysterectomy with removal of piece of colon in rectal area d/t adhesions. and experienced pressure on rectum since hyst. Does seem to be a little better now.    Stomach issues ate the night before and still woke up bloated and full. Early satiety.  Threw up her dinner the next day. Gas and bloating. Is not lactose intolerant and does better with Francisco bread.  Does not wake her at night and feels better after defecation. Sometimes swings to constipation.     Constantly bloated and wt is stuck 144-150  Wt decreases during the day d/t diarrhea and water loss.   dx with DM and issues worse since then.  Immodium, zantac 12 yrs, pepto bismol, miralax, metamucil, omeprazole, ondansetron, metformin, lantis, trulicity,     EGD scheduled 21.     REVIEWED PERTINENT RESULTS/ LABS  No results found for: CASEREPORT, FINALDX  Lab Results   Component Value Date    HGB 12.6 2021    MCV 86.3 2021     2021    ALT 17 02/10/2021    AST 15 02/10/2021    HGBA1C 10.60 (H)  02/09/2021    TRIG 295 (H) 01/12/2021      No results found.    CURRENT MEDICATIONS    Current Outpatient Medications:   •  Biotin 1000 MCG chewable tablet, Chew., Disp: , Rfl:   •  cetirizine (zyrTEC) 10 MG tablet, , Disp: , Rfl:   •  cholecalciferol (VITAMIN D3) 25 MCG (1000 UT) tablet, Take 2,000 Units by mouth Daily., Disp: , Rfl:   •  Dulaglutide (Trulicity) 1.5 MG/0.5ML solution pen-injector, Inject 1.5 mg under the skin into the appropriate area as directed Every 7 (Seven) Days., Disp: 4 pen, Rfl: 5  •  fluticasone (FLONASE) 50 MCG/ACT nasal spray, , Disp: , Rfl:   •  glucose blood test strip, Use as instructed, Disp: 200 each, Rfl: 12  •  glucose monitor monitoring kit, 1 each As Needed (Diabetes 2)., Disp: 1 each, Rfl: 0  •  Insulin Glargine (Lantus SoloStar) 100 UNIT/ML injection pen, Inject 30 Units under the skin into the appropriate area as directed Every Night for 30 days., Disp: 3 pen, Rfl: 0  •  Insulin Pen Needle (Pen Needles) 32G X 5 MM misc, 1 each Daily., Disp: 100 each, Rfl: 3  •  Lancets misc, 1 each 2 (Two) Times a Day., Disp: 200 each, Rfl: 5  •  metFORMIN ER (GLUCOPHAGE-XR) 500 MG 24 hr tablet, Take 500 mg by mouth Daily With Breakfast., Disp: , Rfl:   •  Multiple Vitamins-Minerals (MULTIVITAMIN ADULTS PO), Take  by mouth., Disp: , Rfl:   •  omeprazole (priLOSEC) 40 MG capsule, Take 1 capsule by mouth Daily., Disp: 90 capsule, Rfl: 3  •  ondansetron (ZOFRAN) 8 MG tablet, Take 1 tablet by mouth Every 8 (Eight) Hours As Needed for Nausea or Vomiting., Disp: 20 tablet, Rfl: 0  •  promethazine (PHENERGAN) 12.5 MG tablet, Take 1 tablet by mouth Every 6 (Six) Hours As Needed for Nausea or Vomiting., Disp: 20 tablet, Rfl: 0  •  amitriptyline (ELAVIL) 25 MG tablet, Take 1 tablet by mouth Every Night., Disp: 90 tablet, Rfl: 3    ALLERGIES  Morphine, Latex, and Penicillins    REVIEW OF SYSTEMS  ROS: 14 point review of systems was performed and all other systems were reviewed and are negative except  for documented findings in HPI and today's encounter.   Review of Systems      History     Last Reviewed by Shelby Goldsmith APRN on 6/3/2021 at  1:25 PM    Sections Reviewed    Tobacco, Family, Medical, Surgical      Problem list reviewed by Shelby Goldsmith APRN on 6/3/2021 at  1:25 PM  Medicines reviewed by Shelby Goldsmith APRN on 6/3/2021 at  1:25 PM  Allergies reviewed by Shelby Goldsmith APRN on 6/3/2021 at  1:25 PM      ACTIVE PROBLEMS  Patient Active Problem List    Diagnosis    • Dyspepsia [R10.13]    • Diabetic acidosis without coma (CMS/HCC) [E11.10]    • Anxiety [F41.9]    • Allergy to sunlight [Z91.09]          PAST MEDICAL HISTORY  Past Medical History:   Diagnosis Date   • Diabetes mellitus (CMS/HCC)          SURGICAL HISTORY  Past Surgical History:   Procedure Laterality Date   • BREAST SURGERY     •  SECTION     • CHOLECYSTECTOMY     • SUBTOTAL HYSTERECTOMY           FAMILY HISTORY  Family History   Problem Relation Age of Onset   • Diabetes Mother    • Hyperlipidemia Mother    • Hypertension Mother    • Brain cancer Father    • Heart disease Maternal Grandmother    • Hyperlipidemia Maternal Grandmother    • Diabetes Maternal Grandmother    • Hypertension Maternal Grandmother          SOCIAL HISTORY  Social History     Occupational History   • Not on file   Tobacco Use   • Smoking status: Light Tobacco Smoker     Types: Cigarettes   • Smokeless tobacco: Never Used   Substance and Sexual Activity   • Alcohol use: Yes   • Drug use: Not Currently   • Sexual activity: Not Currently         LAST RESULTS  See also labs reviewed above.   Office Visit on 2021   Component Date Value Ref Range Status   • Color 2021 Dark Yellow  Yellow, Straw, Dark Yellow, Heydi Final   • Clarity, UA 2021 Cloudy* Clear Final   • Specific Gravity  2021 1.020  1.005 - 1.030 Final   • pH, Urine 2021 5.5  5.0 - 8.0 Final   • Leukocytes 2021 Negative  Negative Final   • Nitrite, UA  "05/06/2021 Negative  Negative Final   • Protein, POC 05/06/2021 30 mg/dL* Negative mg/dL Final   • Glucose, UA 05/06/2021 >=1000 mg/dL (3+)* Negative, 1000 mg/dL (3+) mg/dL Final   • Ketones, UA 05/06/2021 >80 mg/dL* Negative Final   • Urobilinogen, UA 05/06/2021 Normal  Normal Final   • Bilirubin 05/06/2021 Negative  Negative Final   • Blood, UA 05/06/2021 Negative  Negative Final   • Microalbumin, Urine 05/04/2021 80   Final   • Creatinine, Urine 05/04/2021 200   Final     No results found.    PHYSICAL EXAM  Debilities/Disabilities Identified: None  Emotional Behavior: Appropriate  46 y.o. female  Wt Readings from Last 3 Encounters:   06/03/21 68 kg (150 lb)   05/04/21 67.1 kg (148 lb)   03/01/21 66 kg (145 lb 9.6 oz)     Ht Readings from Last 1 Encounters:   06/03/21 152.4 cm (60\")     Body mass index is 29.29 kg/m².  Vitals:    06/03/21 1251   Weight: 68 kg (150 lb)   Height: 152.4 cm (60\")     Vital signs reviewed.          Physical Exam  Vitals and nursing note reviewed.   Constitutional:       Appearance: She is well-developed.   HENT:      Head: Normocephalic and atraumatic.   Eyes:      Conjunctiva/sclera: Conjunctivae normal.      Pupils: Pupils are equal, round, and reactive to light.   Cardiovascular:      Rate and Rhythm: Normal rate and regular rhythm.   Pulmonary:      Effort: Pulmonary effort is normal.      Breath sounds: Normal breath sounds.   Abdominal:      General: Bowel sounds are normal. There is no distension.      Palpations: Abdomen is soft.      Tenderness: There is abdominal tenderness in the right upper quadrant, right lower quadrant, epigastric area, periumbilical area, suprapubic area, left upper quadrant and left lower quadrant.   Musculoskeletal:         General: Normal range of motion.      Cervical back: Normal range of motion and neck supple.   Lymphadenopathy:      Cervical: No cervical adenopathy.   Skin:     General: Skin is warm and dry.   Neurological:      Mental Status: " She is alert and oriented to person, place, and time.   Psychiatric:         Behavior: Behavior normal.           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    Diarrhea, unspecified type  -     amitriptyline (ELAVIL) 25 MG tablet; Take 1 tablet by mouth Every Night.    Irritable bowel syndrome with both constipation and diarrhea  -     amitriptyline (ELAVIL) 25 MG tablet; Take 1 tablet by mouth Every Night.          PLAN  No follow-ups on file.     EGD scheduled 7/9/21    Will go ahead and order the GES: had 2 hr GES and was normal but symptoms indicate need to check 4 hr study depending on what Dr. Fritz finds with her EGD.     Discussed the importance of taking Omeprazole/Prilosec, 40mg daily before breakfast permanently due to known risk with long term acid reflux of Flores's esophagus/esophageal cancer.   amitriptyline (ELAVIL) 25 MG tablet; Take 1 tablet by mouth Every Night.  daily probiotic   high fiber diet with 25-30gm a day  simethicone xtra strength gas x (125-180mg) 2 twice a day as needed for gas.    Low Acid Diet Instructions:   Don't eat late, don't eat fried or spicy, and don't eat too much at one time. Avoid alcohol and  tomato based products like pizza, lasagna, spaghetti.  If you want to have these take an over the counter Pepcid or TUMS immediately before you eat them.  Do not eat within 3-4 hrs of  "bedtime. Limit caffeine and carbonated beverages, if you must have them do not have later in the day.  If reflux is severe elevate the head of the bed. You may also use TUMS, maalox, or mylanta for breakthrough heartburn.    Take a daily probiotic (something with a billion cultures and more different strains is better, examples like \"Probiotic 10\" or probiotic gummies) for your gut as well.  AVOID taking NSAIDS (like ibuprofen, Aleve, Motrin, naproxen, meloxicam, etc) as much as possible and use acetaminophen (Tylenol) instead.    Drink lots of water, eat a high fiber diet with 25-30gm a day(check the fiber content in the foods you eat.  Protein bars with 15gm of fiber in each bar are a great supplement daily), and get regular exercise. Use over the counter simethicone xtra strength gas x (125-180mg) 2 twice a day as needed for gas.     High Fiber Food suggestions:  Beans, nuts, vegetables, whole grains, flax seed and jered seeds (which can be stirred into other food) are high in fiber.    Noland Protein bars from [employer] = 10gm of fiber in each bar (also gluten free)  Quest Protein bars from grocery stores [employer], [employer], [employer]= 15gm of fiber each (also gluten free)  Fiber One bars from grocery stores = 5-6gm of fiber each  Kelloggs Bran Buds cereal 1/2 cup = 17gm of fiber  [employer]ger brand low sugar Craisins = 13gm of fiber per serving  Melalueca Fiberwise powder=15gm fiber per serving (2 scoops)  CarbBalance tortillas= 15gm of fiber each  Natural Ovens Keto-Friendly Bread found at [employer]=12gm fiber per serving  No Ifeanyi vegan bar at [employer]=15gm fiber per serving      I have discussed the above plan with the patient.  They verbalize understanding and are in agreement with the plan.  They have been advised to contact the office for any questions, concerns, or changes related to their health.    47 min spent with patient today >50% spent counseling about natural history and expected course of assessed complaint and " reviewed treatment options that have been tried and not tried and those currently available. Questions answered.

## 2021-06-09 ENCOUNTER — TRANSCRIBE ORDERS (OUTPATIENT)
Dept: ADMINISTRATIVE | Facility: HOSPITAL | Age: 46
End: 2021-06-09

## 2021-06-09 DIAGNOSIS — U07.1 COVID-19: Primary | ICD-10-CM

## 2021-07-07 ENCOUNTER — LAB (OUTPATIENT)
Dept: LAB | Facility: HOSPITAL | Age: 46
End: 2021-07-07

## 2021-07-07 DIAGNOSIS — U07.1 COVID-19: ICD-10-CM

## 2021-07-07 LAB — SARS-COV-2 RNA PNL SPEC NAA+PROBE: NOT DETECTED

## 2021-07-07 PROCEDURE — 87635 SARS-COV-2 COVID-19 AMP PRB: CPT | Performed by: OBSTETRICS & GYNECOLOGY

## 2021-07-07 PROCEDURE — C9803 HOPD COVID-19 SPEC COLLECT: HCPCS

## 2021-07-08 ENCOUNTER — ANESTHESIA EVENT (OUTPATIENT)
Dept: PERIOP | Facility: HOSPITAL | Age: 46
End: 2021-07-08

## 2021-07-09 ENCOUNTER — ANESTHESIA (OUTPATIENT)
Dept: PERIOP | Facility: HOSPITAL | Age: 46
End: 2021-07-09

## 2021-07-09 ENCOUNTER — HOSPITAL ENCOUNTER (OUTPATIENT)
Facility: HOSPITAL | Age: 46
Setting detail: HOSPITAL OUTPATIENT SURGERY
Discharge: HOME | End: 2021-07-09
Attending: INTERNAL MEDICINE | Admitting: INTERNAL MEDICINE

## 2021-07-09 VITALS
OXYGEN SATURATION: 100 % | BODY MASS INDEX: 27.56 KG/M2 | RESPIRATION RATE: 20 BRPM | HEIGHT: 60 IN | WEIGHT: 140.4 LBS | HEART RATE: 86 BPM | TEMPERATURE: 98.1 F | SYSTOLIC BLOOD PRESSURE: 128 MMHG | DIASTOLIC BLOOD PRESSURE: 87 MMHG

## 2021-07-09 DIAGNOSIS — R10.13 DYSPEPSIA: ICD-10-CM

## 2021-07-09 LAB — GLUCOSE BLDC GLUCOMTR-MCNC: 308 MG/DL (ref 70–130)

## 2021-07-09 PROCEDURE — 88342 IMHCHEM/IMCYTCHM 1ST ANTB: CPT | Performed by: INTERNAL MEDICINE

## 2021-07-09 PROCEDURE — 43239 EGD BIOPSY SINGLE/MULTIPLE: CPT | Performed by: INTERNAL MEDICINE

## 2021-07-09 PROCEDURE — 82962 GLUCOSE BLOOD TEST: CPT

## 2021-07-09 PROCEDURE — 88305 TISSUE EXAM BY PATHOLOGIST: CPT | Performed by: INTERNAL MEDICINE

## 2021-07-09 PROCEDURE — 25010000003 LIDOCAINE 1 % SOLUTION: Performed by: NURSE ANESTHETIST, CERTIFIED REGISTERED

## 2021-07-09 PROCEDURE — 25010000002 PROPOFOL 10 MG/ML EMULSION: Performed by: NURSE ANESTHETIST, CERTIFIED REGISTERED

## 2021-07-09 RX ORDER — PROPOFOL 10 MG/ML
VIAL (ML) INTRAVENOUS CONTINUOUS PRN
Status: DISCONTINUED | OUTPATIENT
Start: 2021-07-09 | End: 2021-07-09 | Stop reason: SURG

## 2021-07-09 RX ORDER — LIDOCAINE HYDROCHLORIDE 10 MG/ML
INJECTION, SOLUTION INFILTRATION; PERINEURAL AS NEEDED
Status: DISCONTINUED | OUTPATIENT
Start: 2021-07-09 | End: 2021-07-09 | Stop reason: SURG

## 2021-07-09 RX ORDER — SODIUM CHLORIDE 0.9 % (FLUSH) 0.9 %
10 SYRINGE (ML) INJECTION EVERY 12 HOURS SCHEDULED
Status: DISCONTINUED | OUTPATIENT
Start: 2021-07-09 | End: 2021-07-09 | Stop reason: HOSPADM

## 2021-07-09 RX ORDER — LIDOCAINE HYDROCHLORIDE 10 MG/ML
0.5 INJECTION, SOLUTION EPIDURAL; INFILTRATION; INTRACAUDAL; PERINEURAL ONCE AS NEEDED
Status: DISCONTINUED | OUTPATIENT
Start: 2021-07-09 | End: 2021-07-09 | Stop reason: HOSPADM

## 2021-07-09 RX ORDER — PROPOFOL 10 MG/ML
VIAL (ML) INTRAVENOUS AS NEEDED
Status: DISCONTINUED | OUTPATIENT
Start: 2021-07-09 | End: 2021-07-09 | Stop reason: SURG

## 2021-07-09 RX ORDER — SODIUM CHLORIDE 0.9 % (FLUSH) 0.9 %
10 SYRINGE (ML) INJECTION AS NEEDED
Status: DISCONTINUED | OUTPATIENT
Start: 2021-07-09 | End: 2021-07-09 | Stop reason: HOSPADM

## 2021-07-09 RX ORDER — MAGNESIUM HYDROXIDE 1200 MG/15ML
LIQUID ORAL AS NEEDED
Status: DISCONTINUED | OUTPATIENT
Start: 2021-07-09 | End: 2021-07-09 | Stop reason: HOSPADM

## 2021-07-09 RX ORDER — SODIUM CHLORIDE, SODIUM LACTATE, POTASSIUM CHLORIDE, CALCIUM CHLORIDE 600; 310; 30; 20 MG/100ML; MG/100ML; MG/100ML; MG/100ML
9 INJECTION, SOLUTION INTRAVENOUS CONTINUOUS
Status: DISCONTINUED | OUTPATIENT
Start: 2021-07-09 | End: 2021-07-09 | Stop reason: HOSPADM

## 2021-07-09 RX ORDER — SODIUM CHLORIDE 9 MG/ML
40 INJECTION, SOLUTION INTRAVENOUS AS NEEDED
Status: DISCONTINUED | OUTPATIENT
Start: 2021-07-09 | End: 2021-07-09 | Stop reason: HOSPADM

## 2021-07-09 RX ORDER — GLYCOPYRROLATE 0.2 MG/ML
INJECTION INTRAMUSCULAR; INTRAVENOUS AS NEEDED
Status: DISCONTINUED | OUTPATIENT
Start: 2021-07-09 | End: 2021-07-09 | Stop reason: SURG

## 2021-07-09 RX ADMIN — LIDOCAINE HYDROCHLORIDE 50 MG: 10 INJECTION, SOLUTION INFILTRATION; PERINEURAL at 15:24

## 2021-07-09 RX ADMIN — PROPOFOL 50 MG: 10 INJECTION, EMULSION INTRAVENOUS at 15:25

## 2021-07-09 RX ADMIN — PROPOFOL 50 MG: 10 INJECTION, EMULSION INTRAVENOUS at 15:28

## 2021-07-09 RX ADMIN — PROPOFOL 50 MG: 10 INJECTION, EMULSION INTRAVENOUS at 15:35

## 2021-07-09 RX ADMIN — GLYCOPYRROLATE 0.1 MG: 0.2 INJECTION INTRAMUSCULAR; INTRAVENOUS at 15:24

## 2021-07-09 RX ADMIN — SODIUM CHLORIDE, POTASSIUM CHLORIDE, SODIUM LACTATE AND CALCIUM CHLORIDE 9 ML/HR: 600; 310; 30; 20 INJECTION, SOLUTION INTRAVENOUS at 14:34

## 2021-07-09 RX ADMIN — PROPOFOL 50 MG: 10 INJECTION, EMULSION INTRAVENOUS at 15:32

## 2021-07-09 RX ADMIN — PROPOFOL 50 MCG/KG/MIN: 10 INJECTION, EMULSION INTRAVENOUS at 15:25

## 2021-07-09 RX ADMIN — SODIUM CHLORIDE, POTASSIUM CHLORIDE, SODIUM LACTATE AND CALCIUM CHLORIDE: 600; 310; 30; 20 INJECTION, SOLUTION INTRAVENOUS at 15:21

## 2021-07-09 NOTE — H&P
Patient Care Team:  Tatyana Delgado MD as PCP - General (Internal Medicine & Pediatrics)    CHIEF COMPLAINT: dyspepsia    HISTORY OF PRESENT ILLNESS:  See recent note concern for gastroparesis but negative study in the past.Np Dysphagia nor weight loss    Past Medical History:   Diagnosis Date   • Diabetes mellitus (CMS/HCC)    • GERD (gastroesophageal reflux disease)      Past Surgical History:   Procedure Laterality Date   • BREAST SURGERY     •  SECTION     • CHOLECYSTECTOMY     • SUBTOTAL HYSTERECTOMY       Family History   Problem Relation Age of Onset   • Diabetes Mother    • Hyperlipidemia Mother    • Hypertension Mother    • Brain cancer Father    • Heart disease Maternal Grandmother    • Hyperlipidemia Maternal Grandmother    • Diabetes Maternal Grandmother    • Hypertension Maternal Grandmother      Social History     Tobacco Use   • Smoking status: Never Smoker   • Smokeless tobacco: Never Used   Vaping Use   • Vaping Use: Some days   • Substances: Nicotine   Substance Use Topics   • Alcohol use: Yes   • Drug use: Not Currently     Medications Prior to Admission   Medication Sig Dispense Refill Last Dose   • amitriptyline (ELAVIL) 25 MG tablet Take 1 tablet by mouth Every Night. 90 tablet 3 2021 at Unknown time   • cetirizine (zyrTEC) 10 MG tablet    Past Month at Unknown time   • cholecalciferol (VITAMIN D3) 25 MCG (1000 UT) tablet Take 2,000 Units by mouth Daily.   2021 at Unknown time   • Dulaglutide (Trulicity) 1.5 MG/0.5ML solution pen-injector Inject 1.5 mg under the skin into the appropriate area as directed Every 7 (Seven) Days. 4 pen 5 2021 at Unknown time   • fluticasone (FLONASE) 50 MCG/ACT nasal spray    Past Month at Unknown time   • metFORMIN ER (GLUCOPHAGE-XR) 500 MG 24 hr tablet Take 500 mg by mouth Daily With Breakfast.   2021 at Unknown time   • Multiple Vitamins-Minerals (MULTIVITAMIN ADULTS PO) Take  by mouth.   2021 at Unknown time   • omeprazole  "(priLOSEC) 40 MG capsule Take 1 capsule by mouth Daily. 90 capsule 3 7/8/2021 at Unknown time   • ondansetron (ZOFRAN) 8 MG tablet Take 1 tablet by mouth Every 8 (Eight) Hours As Needed for Nausea or Vomiting. 20 tablet 0 Past Month at Unknown time   • Biotin 1000 MCG chewable tablet Chew.   More than a month at Unknown time   • glucose blood test strip Use as instructed 200 each 12    • glucose monitor monitoring kit 1 each As Needed (Diabetes 2). 1 each 0    • Insulin Glargine (Lantus SoloStar) 100 UNIT/ML injection pen Inject 30 Units under the skin into the appropriate area as directed Every Night for 30 days. 3 pen 0 7/2/2021   • Insulin Pen Needle (Pen Needles) 32G X 5 MM misc 1 each Daily. 100 each 3    • Lancets misc 1 each 2 (Two) Times a Day. 200 each 5      Allergies:  Morphine, Latex, and Penicillins    REVIEW OF SYSTEMS:  Please see the above history of present illness for pertinent positives and negatives.  The remainder of the patient's systems have been reviewed and are negative.     Vital Signs  Temp:  [98.1 °F (36.7 °C)] 98.1 °F (36.7 °C)  Heart Rate:  [106] 106  Resp:  [23] 23  BP: (110)/(85) 110/85    Flowsheet Rows      First Filed Value   Admission Height  152.4 cm (60\") Documented at 07/08/2021 1203   Admission Weight  64 kg (141 lb) Documented at 07/08/2021 1203           Physical Exam:  Physical Exam   Constitutional: Patient appears well-developed and well-nourished and in no acute distress   HEENT:   Head: Normocephalic and atraumatic.   Eyes:  Pupils are equal, round, and reactive to light. EOM are intact. Sclerae are anicteric and non-injected.  Mouth and Throat: Patient has moist mucous membranes. Oropharynx is clear of any erythema or exudate.     Neck: Neck supple. No JVD present. No thyromegaly present. No lymphadenopathy present.  Cardiovascular: Regular rate, regular rhythm, S1 normal and S2 normal.  Exam reveals no gallop and no friction rub.  No murmur heard.  Pulmonary/Chest: " Lungs are clear to auscultation bilaterally. No respiratory distress. No wheezes. No rhonchi. No rales.   Abdominal: Soft. Bowel sounds are normal. No distension and no mass. There is no hepatosplenomegaly. There is no tenderness.   Musculoskeletal: Normal Muscle tone  Extremities: No edema. Pulses are palpable in all 4 extremities.  Neurological: Patient is alert and oriented to person, place, and time. Cranial nerves II-XII are grossly intact with no focal deficits.  Skin: Skin is warm. No rash noted. Nails show no clubbing.  No cyanosis or erythema.    Debilities/Disabilities Identified: None  Emotional Behavior: Appropriate     Results Review:   I reviewed the patient's new clinical results.    Lab Results (most recent)     Procedure Component Value Units Date/Time    POC Glucose Once [[id number]]  (Abnormal) Collected: 07/09/21 1431    Specimen: Blood Updated: 07/09/21 1437     Glucose 308 mg/dL      Comment: Meter: [id number] : [id number] Colby Acevedo RN             Imaging Results (Most Recent)     None        reviewed    ECG/EMG Results (most recent)     None        reviewed    Assessment/Plan   dyspepsia/  EGD      I discussed the patient's findings and my recommendations with patient.     Kirk Fritz MD  07/09/21  15:19 EDT    Time: 10 min prior to procedure.

## 2021-07-09 NOTE — BRIEF OP NOTE
ESOPHAGOGASTRODUODENOSCOPY WITH BIOPSY  Progress Note    Yovanny Solitario  7/9/2021    Pre-op Diagnosis:   Dyspepsia [R10.13]       Post-Op Diagnosis Codes:     * Dyspepsia [R10.13]     * Reflux esophagitis [K21.00]     * Gastritis [K29.70]    Procedure/CPT® Codes:        Procedure(s):  ESOPHAGOGASTRODUODENOSCOPY WITH BIOPSY    Surgeon(s):  Kirk Fritz MD    Anesthesia: Monitored Anesthesia Care    Staff:   Circulator: Nikole Blake RN  Scrub Person: Irma Mosley         Estimated Blood Loss: none    Urine Voided: * No values recorded between 7/9/2021  3:20 PM and 7/9/2021  3:35 PM *    Specimens:                Specimens     ID Source Type Tests Collected By Collected At Frozen?    A Small Intestine Tissue · TISSUE PATHOLOGY EXAM   Kirk Fritz MD 7/9/21 1530     This specimen was not marked as sent.    B Stomach Tissue · TISSUE PATHOLOGY EXAM   Kirk Fritz MD 7/9/21 1532     This specimen was not marked as sent.    C Esophagus, Distal Tissue · TISSUE PATHOLOGY EXAM   Kirk Fritz MD 7/9/21 1534     This specimen was not marked as sent.                Drains: * No LDAs found *    Findings: Normal Duodenum-Biopsy  Mild Diffuse gastritis-Biopsy  Reflux Esophagitis-Biopsy    Complications: None          Kirk Fritz MD     Date: 7/9/2021  Time: 15:39 EDT

## 2021-07-09 NOTE — OP NOTE
ESOPHAGOGASTRODUODENOSCOPY WITH BIOPSY  Procedure Report    Patient Name:  Yovanny Solitario  YOB: 1975    Date of Surgery:  7/9/2021     Indications:  Dyspepsia [R10.13]      Pre-op Diagnosis:   Dyspepsia [R10.13]    Post-Op Diagnosis Codes:     * Dyspepsia [R10.13]     * Reflux esophagitis [K21.00]     * Gastritis [K29.70]         Procedure/CPT® Codes:      Procedure(s):  ESOPHAGOGASTRODUODENOSCOPY WITH BIOPSY    Staff:  Surgeon(s):  Kirk Fritz MD         Anesthesia: Monitored Anesthesia Care    Estimated Blood Loss: none    Specimens:   ID Type Source Tests Collected by Time   A (Not marked as sent) :  Tissue Small Intestine TISSUE PATHOLOGY EXAM Kirk Fritz MD 7/9/2021 1530   B (Not marked as sent) :  Tissue Stomach TISSUE PATHOLOGY EXAM Kirk Fritz MD 7/9/2021 1532   C (Not marked as sent) :  Tissue Esophagus, Distal TISSUE PATHOLOGY EXAM Kirk Fritz MD 7/9/2021 1534       Implants:    Nothing was implanted during the procedure      Description of Procedure: After having signed informed consent, she was brought to the endoscopy suite and placed in left lateral decubitus position and given her IV sedation.  A bite block was placed between her incisors.  The scope was introduced into the oropharynx and advanced under direct visualization with ease into the esophagus through to the distal esophagus.  The Z-line was only mildly irregular.  There was no active ulcer nor stricture.  The scope was advanced into the stomach and retroflexed revealing scattered petechial erythema throughout the gastric mucosa.  The scope was de-retroflexed and advanced into the antrum.  This continued from the body into the antrum as well and there was no active ulcer and no vascular lesion.  The scope was advanced into the duodenum.  The bulb was examined and normal.  The scope was advanced around the angle of the 2nd and 3rd portions of the duodenum which  were normal as well.   Biopsies were taken to rule out celiac disease.  The scope was withdrawn into the antrum and biopsies were taken of the body and antrum and labeled as gastric biopsies to rule out H. pylori.  The scope was withdrawn to the distal esophagus and targeted biopsies were taken to rule out short segment Flores’s esophagus though this was not suspected visually.  As the scope was withdrawn the remainder of the esophagus was easily distended and normal appearing.  The scope was taken from the patient.  She tolerated the procedure well.            Findings: Normal Duodenum-Biopsy  Mild Diffuse gastritis-Biopsy  Reflux Esophagitis-Biopsy     Complications: None    Recommendations: Results to be called. Continue present meds and Follow up in office      Kirk Fritz MD     Date: 7/9/2021  Time: 15:40 EDT

## 2021-07-09 NOTE — ANESTHESIA POSTPROCEDURE EVALUATION
Patient: Yovanny Solitario    Procedure Summary     Date: 07/09/21 Room / Location: [facility] ENDOSCOPY 1 /  [facility]    Anesthesia Start: 1521 Anesthesia Stop: 1537    Procedure: ESOPHAGOGASTRODUODENOSCOPY WITH BIOPSY (N/A Esophagus) Diagnosis:       Dyspepsia      Reflux esophagitis      Gastritis      (Dyspepsia [R10.13])    Surgeons: Kirk Fritz MD Provider: Lalo Vo CRNA    Anesthesia Type: MAC ASA Status: 2          Anesthesia Type: MAC    Vitals  Vitals Value Taken Time   /80 07/09/21 1610   Temp     Pulse 86 07/09/21 1610   Resp 15 07/09/21 1605   SpO2 99 % 07/09/21 1610           Post Anesthesia Care and Evaluation    Patient location during evaluation: PHASE II  Patient participation: complete - patient participated  Level of consciousness: awake  Pain management: adequate  Airway patency: patent  Anesthetic complications: No anesthetic complications  PONV Status: none  Cardiovascular status: acceptable  Respiratory status: acceptable  Hydration status: acceptable    Comments: Discussed in detail pt's uncontrolled DM2 and her need to follow up with PCP for control.  Pt has been out of town for 1 week and did not take her DM medication.  Will restart them today when she gets home.

## 2021-07-09 NOTE — ANESTHESIA PREPROCEDURE EVALUATION
Anesthesia Evaluation     Patient summary reviewed and Nursing notes reviewed   no history of anesthetic complications:  NPO Solid Status: > 8 hours  NPO Liquid Status: > 8 hours           Airway   Mallampati: II  TM distance: >3 FB  Neck ROM: full  No difficulty expected  Dental      Pulmonary     breath sounds clear to auscultation  (+) a smoker (vapor ) Current,   Cardiovascular   Exercise tolerance: good (4-7 METS)    ECG reviewed  Rhythm: regular  Rate: normal    Hyperlipidemia: ?    ROS comment: Narrative & Impression    HEART RATE= 134  bpm  RR Interval= 448  ms  SD Interval= 120  ms  P Horizontal Axis= 29  deg  P Front Axis= 61  deg  QRSD Interval= 76  ms  QT Interval= 324  ms  QRS Axis= 15  deg  T Wave Axis= 16  deg  - OTHERWISE NORMAL ECG -  Sinus tachycardia  NO PRIOR TRACING AVAILABLE FOR COMPARISON  Electronically Signed By: Will Carmichael ([facility]) 09-Feb-2021 08:43:55  Date and Time of Study: 2021-02-09 07:44:45        Neuro/Psych  (+) headaches, numbness (dilshad feet), psychiatric history Anxiety,     Seizures: ? febuary related to DM   GI/Hepatic/Renal/Endo    (+)  GERD well controlled,  diabetes mellitus (11.2) type 2 poorly controlled,     ROS Comment: Diabetic acidosis without coma (CMS/HCC)    Musculoskeletal     (+) back pain, chronic pain, neck pain, neck stiffness,   Abdominal    Substance History   (+) alcohol use (rare ),      OB/GYN          Other      history of cancer (cervical/uterine skin ) remission                    Anesthesia Plan    ASA 2     MAC     intravenous induction     Anesthetic plan, all risks, benefits, and alternatives have been provided, discussed and informed consent has been obtained with: patient.  Use of blood products discussed with patient  Consented to blood products.

## 2021-07-13 LAB
CYTO UR: NORMAL
LAB AP CASE REPORT: NORMAL
PATH REPORT.FINAL DX SPEC: NORMAL
PATH REPORT.GROSS SPEC: NORMAL

## 2021-07-13 RX ORDER — METRONIDAZOLE 500 MG/1
500 TABLET ORAL 3 TIMES DAILY
Qty: 42 TABLET | Refills: 0 | Status: SHIPPED | OUTPATIENT
Start: 2021-07-13 | End: 2021-07-14

## 2021-07-13 RX ORDER — CLARITHROMYCIN 500 MG/1
500 TABLET, COATED ORAL 3 TIMES DAILY
Qty: 42 TABLET | Refills: 0 | Status: SHIPPED | OUTPATIENT
Start: 2021-07-13 | End: 2021-07-14

## 2021-07-14 DIAGNOSIS — A04.8 H. PYLORI INFECTION: Primary | ICD-10-CM

## 2021-07-14 RX ORDER — METRONIDAZOLE 500 MG/1
500 TABLET ORAL 2 TIMES DAILY
Qty: 28 TABLET | Refills: 0 | Status: SHIPPED | OUTPATIENT
Start: 2021-07-14 | End: 2021-07-28

## 2021-07-14 RX ORDER — CLARITHROMYCIN 500 MG/1
500 TABLET, COATED ORAL 2 TIMES DAILY
Qty: 28 TABLET | Refills: 0 | Status: SHIPPED | OUTPATIENT
Start: 2021-07-14 | End: 2021-07-28

## 2021-07-15 ENCOUNTER — TELEPHONE (OUTPATIENT)
Dept: GASTROENTEROLOGY | Facility: CLINIC | Age: 46
End: 2021-07-15

## 2021-07-15 NOTE — TELEPHONE ENCOUNTER
Faxed med change to [facility] per Shelby:  Flagyl 500 BID (rather then TID) x 14 days and   Clarithromycin 500 mg BID (rather than TID) x 14 days.  In response to pharmacist recommendation.

## 2021-07-22 ENCOUNTER — OFFICE VISIT (OUTPATIENT)
Dept: GASTROENTEROLOGY | Facility: CLINIC | Age: 46
End: 2021-07-22

## 2021-07-22 VITALS
HEIGHT: 60 IN | BODY MASS INDEX: 27.68 KG/M2 | WEIGHT: 141 LBS | SYSTOLIC BLOOD PRESSURE: 118 MMHG | DIASTOLIC BLOOD PRESSURE: 76 MMHG

## 2021-07-22 DIAGNOSIS — R11.0 NAUSEA: ICD-10-CM

## 2021-07-22 DIAGNOSIS — R14.0 ABDOMINAL BLOATING: ICD-10-CM

## 2021-07-22 DIAGNOSIS — K58.2 IRRITABLE BOWEL SYNDROME WITH BOTH CONSTIPATION AND DIARRHEA: ICD-10-CM

## 2021-07-22 DIAGNOSIS — R10.13 DYSPEPSIA: ICD-10-CM

## 2021-07-22 DIAGNOSIS — K21.00 GASTROESOPHAGEAL REFLUX DISEASE WITH ESOPHAGITIS WITHOUT HEMORRHAGE: ICD-10-CM

## 2021-07-22 DIAGNOSIS — A04.8 H. PYLORI INFECTION: Primary | ICD-10-CM

## 2021-07-22 PROCEDURE — 99213 OFFICE O/P EST LOW 20 MIN: CPT | Performed by: NURSE PRACTITIONER

## 2021-07-22 RX ORDER — OMEPRAZOLE 40 MG/1
40 CAPSULE, DELAYED RELEASE ORAL 2 TIMES DAILY
Qty: 180 CAPSULE | Refills: 3 | Status: SHIPPED | OUTPATIENT
Start: 2021-07-22 | End: 2021-09-23 | Stop reason: SDUPTHER

## 2021-07-22 RX ORDER — OMEPRAZOLE 40 MG/1
40 CAPSULE, DELAYED RELEASE ORAL DAILY
Qty: 180 CAPSULE | Refills: 3 | Status: SHIPPED | OUTPATIENT
Start: 2021-07-22 | End: 2021-07-22 | Stop reason: SDUPTHER

## 2021-07-22 RX ORDER — ONDANSETRON HYDROCHLORIDE 8 MG/1
8 TABLET, FILM COATED ORAL EVERY 8 HOURS PRN
Qty: 30 TABLET | Refills: 3 | Status: SHIPPED | OUTPATIENT
Start: 2021-07-22 | End: 2021-09-23 | Stop reason: SDUPTHER

## 2021-07-22 NOTE — PROGRESS NOTES
PATIENT INFORMATION  Yovanny Solitario     - 1975    CHIEF COMPLAINT  Chief Complaint   Patient presents with   • Heartburn   • H Pylori     pt to  antibiotics today   • Gastritis   • Dyspepsia       HISTORY OF PRESENT ILLNESS  Bloating abd pain, dyspepsia and loose bowels since EGD.    EGD scheduled 21EGD was for Dyspepsia biopsy was Positive for Normal Duodenum,. H. Pylori Gastritis, Reflux Esophagitis  Negative for Celiac, Flores's   So ill send in Meds for 2 week treatment         REVIEWED PERTINENT RESULTS/ LABS  Lab Results   Component Value Date    CASEREPORT  2021     Surgical Pathology Report                         Case: [id number]                                  Authorizing Provider:  Kirk Fritz        Collected:           2021 03:30 PM                                 MD Eloy                                                                   Ordering [facility]   Received:            2021 03:51 PM                                 OR                                                                           Pathologist:           Leena Snow MD                                                          Specimens:   1) - Small Intestine                                                                                2) - Stomach                                                                                        3) - Esophagus, Distal                                                                     FINALDX  2021     1. Small Bowel, Biopsy: Benign small bowel mucosa with   A.  Normal intact villous surface.   B.  No significant inflammation, no granulomas.   C.  No viral inclusions or other organisms on routinely stained sections.    2. Stomach, Biopsy: Oxyntic and antral type gastric mucosa with    A. H pylori gastritis (confirmed by immunohistochemical stain).   B. No metaplasia, dysplasia nor malignancy.     3.  Esophagus, Distal Biopsy: Squamoglandular mucosa and submucosa with    A. No definitive well developed goblet cell metaplasia identified by routine staining.   B. Mild mixed but predominantly chronic inflammation and repair; negative for dysplasia        and malignancy.  C. No significant esophageal eosinophilia.  D. No viral inclusions nor fungal organisms identified.    [facility]/pkm          Lab Results   Component Value Date    HGB 12.6 02/11/2021    MCV 86.3 02/11/2021     02/11/2021    ALT 17 02/10/2021    AST 15 02/10/2021    HGBA1C 10.60 (H) 02/09/2021    TRIG 295 (H) 01/12/2021      No results found.    CURRENT MEDICATIONS    Current Outpatient Medications:   •  amitriptyline (ELAVIL) 25 MG tablet, Take 1 tablet by mouth Every Night., Disp: 90 tablet, Rfl: 3  •  Biotin 1000 MCG chewable tablet, Chew., Disp: , Rfl:   •  cetirizine (zyrTEC) 10 MG tablet, , Disp: , Rfl:   •  cholecalciferol (VITAMIN D3) 25 MCG (1000 UT) tablet, Take 2,000 Units by mouth Daily., Disp: , Rfl:   •  clarithromycin (BIAXIN) 500 MG tablet, Take 1 tablet by mouth 2 (Two) Times a Day for 14 days., Disp: 28 tablet, Rfl: 0  •  Dulaglutide (Trulicity) 1.5 MG/0.5ML solution pen-injector, Inject 1.5 mg under the skin into the appropriate area as directed Every 7 (Seven) Days., Disp: 4 pen, Rfl: 5  •  fluticasone (FLONASE) 50 MCG/ACT nasal spray, , Disp: , Rfl:   •  glucose blood test strip, Use as instructed, Disp: 200 each, Rfl: 12  •  glucose monitor monitoring kit, 1 each As Needed (Diabetes 2)., Disp: 1 each, Rfl: 0  •  Insulin Pen Needle (Pen Needles) 32G X 5 MM misc, 1 each Daily., Disp: 100 each, Rfl: 3  •  Lancets misc, 1 each 2 (Two) Times a Day., Disp: 200 each, Rfl: 5  •  metFORMIN ER (GLUCOPHAGE-XR) 500 MG 24 hr tablet, Take 500 mg by mouth Daily With Breakfast., Disp: , Rfl:   •  metroNIDAZOLE (FLAGYL) 500 MG tablet, Take 1 tablet by mouth 2 (Two) Times a Day for 14 days., Disp: 28 tablet, Rfl: 0  •  Multiple  Vitamins-Minerals (MULTIVITAMIN ADULTS PO), Take  by mouth., Disp: , Rfl:   •  omeprazole (priLOSEC) 40 MG capsule, Take 1 capsule by mouth 2 (two) times a day., Disp: 180 capsule, Rfl: 3  •  ondansetron (ZOFRAN) 8 MG tablet, Take 1 tablet by mouth Every 8 (Eight) Hours As Needed for Nausea or Vomiting., Disp: 30 tablet, Rfl: 3    ALLERGIES  Morphine, Latex, and Penicillins    REVIEW OF SYSTEMS  ROS: 14 point review of systems was performed and all other systems were reviewed and are negative except for documented findings in HPI and today's encounter.   Review of Systems   Gastrointestinal: Positive for abdominal distention, nausea and vomiting.         History     Last Reviewed by Shelby Goldsmith APRN on 2021 at  4:21 PM    Sections Reviewed    Tobacco, Family, Medical, Surgical      Problem list reviewed by Shelby Goldsmith APRN on 2021 at  4:21 PM  Medicines reviewed by Shelby Goldsmith APRN on 2021 at  4:21 PM  Allergies reviewed by Shelby Goldsmith APRN on 2021 at  4:21 PM      ACTIVE PROBLEMS  Patient Active Problem List    Diagnosis    • Dyspepsia [R10.13]    • Diabetic acidosis without coma (CMS/HCC) [E11.10]    • Anxiety [F41.9]    • Allergy to sunlight [Z91.09]          PAST MEDICAL HISTORY  Past Medical History:   Diagnosis Date   • Diabetes mellitus (CMS/HCC)    • GERD (gastroesophageal reflux disease)          SURGICAL HISTORY  Past Surgical History:   Procedure Laterality Date   • BREAST SURGERY     •  SECTION     • CHOLECYSTECTOMY     • ENDOSCOPY N/A 2021    Procedure: ESOPHAGOGASTRODUODENOSCOPY WITH BIOPSY;  Surgeon: Kirk Fritz MD;  Location: [facility];  Service: Gastroenterology;  Laterality: N/A;  duodenum biopsy  gastric biopsy  distal esophagus biopsy  reflux esophagitis  gastritis   • SUBTOTAL HYSTERECTOMY           FAMILY HISTORY  Family History   Problem Relation Age of Onset   • Diabetes Mother    • Hyperlipidemia Mother    • Hypertension Mother     • Brain cancer Father    • Heart disease Maternal Grandmother    • Hyperlipidemia Maternal Grandmother    • Diabetes Maternal Grandmother    • Hypertension Maternal Grandmother          SOCIAL HISTORY  Social History     Occupational History   • Not on file   Tobacco Use   • Smoking status: Never Smoker   • Smokeless tobacco: Never Used   Vaping Use   • Vaping Use: Some days   • Substances: Nicotine   Substance and Sexual Activity   • Alcohol use: Yes   • Drug use: Not Currently   • Sexual activity: Not Currently         LAST RESULTS  See also labs reviewed above.   Admission on 07/09/2021, Discharged on 07/09/2021   Component Date Value Ref Range Status   • Glucose 07/09/2021 308* 70 - 130 mg/dL Final    Meter: [id number] : [id number] Colby Acevedo RN   • Case Report 07/09/2021    Final                    Value:Surgical Pathology Report                         Case: [id number]                                  Authorizing Provider:  Kirk Fritz        Collected:           07/09/2021 03:30 PM                                 MD Eloy                                                                   Ordering Location:     [facility]   Received:            07/09/2021 03:51 PM                                 OR                                                                           Pathologist:           Leena Snow MD                                                          Specimens:   1) - Small Intestine                                                                                2) - Stomach                                                                                        3) - Esophagus, Distal                                                                    • Final Diagnosis 07/09/2021    Final                    Value:This result contains rich text formatting which cannot be displayed here.   • Gross Description 07/09/2021    Final                    Value:This  "result contains rich text formatting which cannot be displayed here.   • Microscopic Description 07/09/2021    Final                    Value:This result contains rich text formatting which cannot be displayed here.     No results found.    PHYSICAL EXAM  Debilities/Disabilities Identified: None  Emotional Behavior: Appropriate  46 y.o. female  Wt Readings from Last 3 Encounters:   07/22/21 64 kg (141 lb)   07/09/21 63.7 kg (140 lb 6.4 oz)   06/03/21 68 kg (150 lb)     Ht Readings from Last 1 Encounters:   07/22/21 152.4 cm (60\")     Body mass index is 27.54 kg/m².  Vitals:    07/22/21 1508   BP: 118/76   BP Location: Left arm   Patient Position: Sitting   Cuff Size: Large Adult   Weight: 64 kg (141 lb)   Height: 152.4 cm (60\")     Vital signs reviewed.          Physical Exam  Vitals and nursing note reviewed.   Constitutional:       Appearance: She is well-developed.   HENT:      Head: Normocephalic and atraumatic.   Eyes:      Conjunctiva/sclera: Conjunctivae normal.      Pupils: Pupils are equal, round, and reactive to light.   Cardiovascular:      Rate and Rhythm: Normal rate and regular rhythm.   Pulmonary:      Effort: Pulmonary effort is normal.      Breath sounds: Normal breath sounds.   Abdominal:      General: Bowel sounds are normal. There is no distension.      Palpations: Abdomen is soft.      Tenderness: There is abdominal tenderness in the right upper quadrant, epigastric area, periumbilical area and left upper quadrant.   Musculoskeletal:         General: Normal range of motion.      Cervical back: Normal range of motion and neck supple.   Lymphadenopathy:      Cervical: No cervical adenopathy.   Skin:     General: Skin is warm and dry.   Neurological:      Mental Status: She is alert and oriented to person, place, and time.   Psychiatric:         Behavior: Behavior normal.           CLINICAL DATA REVIEWED   reviewed previous lab results and integrated with today's visit, reviewed notes from other " physicians and/or last GI encounter, reviewed previous endoscopy results and available photos, reviewed surgical pathology results from previous biopsies      ASSESSMENT  Diagnoses and all orders for this visit:    H. pylori infection  -     Discontinue: omeprazole (priLOSEC) 40 MG capsule; Take 1 capsule by mouth Daily.  -     omeprazole (priLOSEC) 40 MG capsule; Take 1 capsule by mouth 2 (two) times a day.    Dyspepsia  -     Discontinue: omeprazole (priLOSEC) 40 MG capsule; Take 1 capsule by mouth Daily.  -     omeprazole (priLOSEC) 40 MG capsule; Take 1 capsule by mouth 2 (two) times a day.    Abdominal bloating    Irritable bowel syndrome with both constipation and diarrhea    Gastroesophageal reflux disease with esophagitis without hemorrhage  -     Discontinue: omeprazole (priLOSEC) 40 MG capsule; Take 1 capsule by mouth Daily.  -     omeprazole (priLOSEC) 40 MG capsule; Take 1 capsule by mouth 2 (two) times a day.    Nausea  -     ondansetron (ZOFRAN) 8 MG tablet; Take 1 tablet by mouth Every 8 (Eight) Hours As Needed for Nausea or Vomiting.          PLAN  Return in about 2 months (around 9/22/2021).  Flagyl (metronidazole) 500mg twice a day for 2 weeks.  Clarithromycin 500mg twice a day for 2 weeks.  Daily probiotic.   Discussed the importance of taking Omeprazole/Prilosec, 40mg twice daily before breakfast and dinner permanently due to known risk with long term acid reflux of Flores's esophagus/esophageal cancer.   Greek yogurt. Soft bland diet.      Low Acid Diet Instructions:   Don't eat late, don't eat fried or spicy, and don't eat too much at one time. Avoid alcohol and  tomato based products like pizza, lasagna, spaghetti.  If you want to have these take an over the counter Pepcid or TUMS immediately before you eat them.  Do not eat within 3-4 hrs of bedtime. Limit caffeine and carbonated beverages, if you must have them do not have later in the day.  If reflux is severe elevate the head of the bed.  "You may also use TUMS, maalox, or mylanta for breakthrough heartburn.    Take a daily probiotic (something with a billion cultures and more different strains is better, examples like \"Probiotic 10\" or probiotic gummies) for your gut as well.  AVOID taking NSAIDS (like ibuprofen, Aleve, Motrin, naproxen, meloxicam, etc) as much as possible and use acetaminophen (Tylenol) instead.        I have discussed the above plan with the patient.  They verbalize understanding and are in agreement with the plan.  They have been advised to contact the office for any questions, concerns, or changes related to their health.    30 min spent with patient today >50% spent counseling about natural history and expected course of assessed complaint and reviewed treatment options that have been tried and not tried and those currently available. Questions answered.    "

## 2021-07-22 NOTE — PATIENT INSTRUCTIONS
"Flagyl (metronidazole) 500mg twice a day for 2 weeks.  Clarithromycin 500mg twice a day for 2 weeks.  Daily probiotic.   Discussed the importance of taking Omeprazole/Prilosec, 40mg twice daily before breakfast and dinner permanently due to known risk with long term acid reflux of Flores's esophagus/esophageal cancer.   Greek yogurt. Soft bland diet.      Low Acid Diet Instructions:   Don't eat late, don't eat fried or spicy, and don't eat too much at one time. Avoid alcohol and  tomato based products like pizza, lasagna, spaghetti.  If you want to have these take an over the counter Pepcid or TUMS immediately before you eat them.  Do not eat within 3-4 hrs of bedtime. Limit caffeine and carbonated beverages, if you must have them do not have later in the day.  If reflux is severe elevate the head of the bed. You may also use TUMS, maalox, or mylanta for breakthrough heartburn.    Take a daily probiotic (something with a billion cultures and more different strains is better, examples like \"Probiotic 10\" or probiotic gummies) for your gut as well.  AVOID taking NSAIDS (like ibuprofen, Aleve, Motrin, naproxen, meloxicam, etc) as much as possible and use acetaminophen (Tylenol) instead.      "

## 2021-07-24 ENCOUNTER — HOSPITAL ENCOUNTER (EMERGENCY)
Facility: HOSPITAL | Age: 46
Discharge: HOME | End: 2021-07-25
Attending: EMERGENCY MEDICINE | Admitting: EMERGENCY MEDICINE

## 2021-07-24 DIAGNOSIS — E11.65 TYPE 2 DIABETES MELLITUS WITH HYPERGLYCEMIA, WITHOUT LONG-TERM CURRENT USE OF INSULIN (HCC): ICD-10-CM

## 2021-07-24 DIAGNOSIS — R51.9 HEADACHE, CHRONIC DAILY: ICD-10-CM

## 2021-07-24 DIAGNOSIS — G89.29 NECK PAIN, CHRONIC: Primary | ICD-10-CM

## 2021-07-24 DIAGNOSIS — M54.2 NECK PAIN, CHRONIC: Primary | ICD-10-CM

## 2021-07-24 PROCEDURE — 99283 EMERGENCY DEPT VISIT LOW MDM: CPT

## 2021-07-25 ENCOUNTER — APPOINTMENT (OUTPATIENT)
Dept: CT IMAGING | Facility: HOSPITAL | Age: 46
End: 2021-07-25

## 2021-07-25 ENCOUNTER — APPOINTMENT (OUTPATIENT)
Dept: GENERAL RADIOLOGY | Facility: HOSPITAL | Age: 46
End: 2021-07-25

## 2021-07-25 VITALS
HEIGHT: 60 IN | WEIGHT: 140 LBS | RESPIRATION RATE: 18 BRPM | HEART RATE: 101 BPM | SYSTOLIC BLOOD PRESSURE: 125 MMHG | BODY MASS INDEX: 27.48 KG/M2 | TEMPERATURE: 97.8 F | DIASTOLIC BLOOD PRESSURE: 87 MMHG | OXYGEN SATURATION: 99 %

## 2021-07-25 LAB
ALBUMIN SERPL-MCNC: 4.8 G/DL (ref 3.5–5.2)
ALBUMIN/GLOB SERPL: 1.9 G/DL
ALP SERPL-CCNC: 129 U/L (ref 39–117)
ALT SERPL W P-5'-P-CCNC: 55 U/L (ref 1–33)
ANION GAP SERPL CALCULATED.3IONS-SCNC: 9.7 MMOL/L (ref 5–15)
AST SERPL-CCNC: 20 U/L (ref 1–32)
BASOPHILS # BLD AUTO: 0.03 10*3/MM3 (ref 0–0.2)
BASOPHILS NFR BLD AUTO: 0.3 % (ref 0–1.5)
BILIRUB SERPL-MCNC: 0.7 MG/DL (ref 0–1.2)
BUN SERPL-MCNC: 9 MG/DL (ref 6–20)
BUN/CREAT SERPL: 11.1 (ref 7–25)
CALCIUM SPEC-SCNC: 9.7 MG/DL (ref 8.6–10.5)
CHLORIDE SERPL-SCNC: 94 MMOL/L (ref 98–107)
CO2 SERPL-SCNC: 28.3 MMOL/L (ref 22–29)
CREAT SERPL-MCNC: 0.81 MG/DL (ref 0.57–1)
DEPRECATED RDW RBC AUTO: 36 FL (ref 37–54)
EOSINOPHIL # BLD AUTO: 0.25 10*3/MM3 (ref 0–0.4)
EOSINOPHIL NFR BLD AUTO: 2.8 % (ref 0.3–6.2)
ERYTHROCYTE [DISTWIDTH] IN BLOOD BY AUTOMATED COUNT: 11.9 % (ref 12.3–15.4)
GFR SERPL CREATININE-BSD FRML MDRD: 77 ML/MIN/1.73
GLOBULIN UR ELPH-MCNC: 2.5 GM/DL
GLUCOSE BLDC GLUCOMTR-MCNC: 150 MG/DL (ref 70–130)
GLUCOSE BLDC GLUCOMTR-MCNC: 443 MG/DL (ref 70–130)
GLUCOSE SERPL-MCNC: 479 MG/DL (ref 65–99)
HCT VFR BLD AUTO: 42.7 % (ref 34–46.6)
HGB BLD-MCNC: 15.4 G/DL (ref 12–15.9)
IMM GRANULOCYTES # BLD AUTO: 0.04 10*3/MM3 (ref 0–0.05)
IMM GRANULOCYTES NFR BLD AUTO: 0.4 % (ref 0–0.5)
LYMPHOCYTES # BLD AUTO: 2.07 10*3/MM3 (ref 0.7–3.1)
LYMPHOCYTES NFR BLD AUTO: 23.3 % (ref 19.6–45.3)
MCH RBC QN AUTO: 30.3 PG (ref 26.6–33)
MCHC RBC AUTO-ENTMCNC: 36.1 G/DL (ref 31.5–35.7)
MCV RBC AUTO: 84.1 FL (ref 79–97)
MONOCYTES # BLD AUTO: 0.65 10*3/MM3 (ref 0.1–0.9)
MONOCYTES NFR BLD AUTO: 7.3 % (ref 5–12)
NEUTROPHILS NFR BLD AUTO: 5.86 10*3/MM3 (ref 1.7–7)
NEUTROPHILS NFR BLD AUTO: 65.9 % (ref 42.7–76)
NRBC BLD AUTO-RTO: 0 /100 WBC (ref 0–0.2)
PLATELET # BLD AUTO: 245 10*3/MM3 (ref 140–450)
PMV BLD AUTO: 12.2 FL (ref 6–12)
POTASSIUM SERPL-SCNC: 3.7 MMOL/L (ref 3.5–5.2)
PROT SERPL-MCNC: 7.3 G/DL (ref 6–8.5)
RBC # BLD AUTO: 5.08 10*6/MM3 (ref 3.77–5.28)
SODIUM SERPL-SCNC: 132 MMOL/L (ref 136–145)
WBC # BLD AUTO: 8.9 10*3/MM3 (ref 3.4–10.8)

## 2021-07-25 PROCEDURE — 63710000001 INSULIN REGULAR HUMAN PER 5 UNITS: Performed by: EMERGENCY MEDICINE

## 2021-07-25 PROCEDURE — 70450 CT HEAD/BRAIN W/O DYE: CPT

## 2021-07-25 PROCEDURE — 72050 X-RAY EXAM NECK SPINE 4/5VWS: CPT

## 2021-07-25 PROCEDURE — 85025 COMPLETE CBC W/AUTO DIFF WBC: CPT | Performed by: EMERGENCY MEDICINE

## 2021-07-25 PROCEDURE — 99284 EMERGENCY DEPT VISIT MOD MDM: CPT | Performed by: EMERGENCY MEDICINE

## 2021-07-25 PROCEDURE — 80053 COMPREHEN METABOLIC PANEL: CPT | Performed by: EMERGENCY MEDICINE

## 2021-07-25 PROCEDURE — 82962 GLUCOSE BLOOD TEST: CPT

## 2021-07-25 RX ORDER — NABUMETONE 500 MG/1
500 TABLET, FILM COATED ORAL 2 TIMES DAILY PRN
Qty: 14 TABLET | Refills: 0 | Status: SHIPPED | OUTPATIENT
Start: 2021-07-25 | End: 2021-08-01

## 2021-07-25 RX ORDER — METHOCARBAMOL 750 MG/1
1500 TABLET, FILM COATED ORAL 3 TIMES DAILY
Qty: 60 TABLET | Refills: 0 | Status: SHIPPED | OUTPATIENT
Start: 2021-07-25 | End: 2021-08-04

## 2021-07-25 RX ORDER — SODIUM CHLORIDE 0.9 % (FLUSH) 0.9 %
10 SYRINGE (ML) INJECTION AS NEEDED
Status: DISCONTINUED | OUTPATIENT
Start: 2021-07-25 | End: 2021-07-25 | Stop reason: HOSPADM

## 2021-07-25 RX ADMIN — SODIUM CHLORIDE 1000 ML: 9 INJECTION, SOLUTION INTRAVENOUS at 00:25

## 2021-07-25 RX ADMIN — HUMAN INSULIN 10 UNITS: 100 INJECTION, SOLUTION SUBCUTANEOUS at 01:59

## 2021-07-25 NOTE — DISCHARGE INSTRUCTIONS
Medications as directed.  Follow-up with Dr. Delgado as above.  If she feels indicated she may obtain an MRI of your neck.  Follow-up with Dr. Gates as above.  Monitor your blood sugar regularly.  Return to ED for worsening symptoms, medical emergencies.

## 2021-07-25 NOTE — ED PROVIDER NOTES
Subjective   Yovanny Solitario is a 46-year-old female who present secondary to headache and neck pain.  Symptoms began 2 to 3 months ago.  Patient experiencing pain in posterior and the right neck as well as occipital head.  Initially the symptoms came and went.  However the pain has been constant for several weeks.  Patient has not seen a physician about this until tonight.  She became concerned that she might have meningitis or an aneurysm.  Thus patient elected to come the emergency room for evaluation.    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  Past medical history significant for diabetes.  Patient reports blood sugar is usually in the 200s.  Was over 400 earlier tonight.    Family history significant for father with cerebral aneurysm      History provided by:  Patient      Review of Systems   Constitutional: Negative.  Negative for fever.   HENT: Negative for rhinorrhea.    Eyes: Negative.  Negative for redness.   Respiratory: Negative for cough.    Cardiovascular: Negative for chest pain.   Gastrointestinal: Negative for abdominal pain.   Endocrine: Negative.    Genitourinary: Negative.  Negative for difficulty urinating.   Musculoskeletal: Positive for neck pain. Negative for back pain.   Skin: Negative.  Negative for color change.   Neurological: Positive for headaches. Negative for syncope.   Hematological: Negative.    Psychiatric/Behavioral: Negative.    All other systems reviewed and are negative.      Past Medical History:   Diagnosis Date   • Diabetes mellitus (CMS/HCC)    • GERD (gastroesophageal reflux disease)        Allergies   Allergen Reactions   • Morphine Shortness Of Breath   • Latex Dermatitis   • Penicillins Hives       Past Surgical History:   Procedure Laterality Date   • BREAST SURGERY     •  SECTION     • CHOLECYSTECTOMY     • ENDOSCOPY N/A 2021    Procedure: ESOPHAGOGASTRODUODENOSCOPY WITH BIOPSY;  Surgeon: Kirk Fritz MD;  Location: [facility] OR;   Service: Gastroenterology;  Laterality: N/A;  duodenum biopsy  gastric biopsy  distal esophagus biopsy  reflux esophagitis  gastritis   • SUBTOTAL HYSTERECTOMY         Family History   Problem Relation Age of Onset   • Diabetes Mother    • Hyperlipidemia Mother    • Hypertension Mother    • Brain cancer Father    • Heart disease Maternal Grandmother    • Hyperlipidemia Maternal Grandmother    • Diabetes Maternal Grandmother    • Hypertension Maternal Grandmother        Social History     Socioeconomic History   • Marital status: Single     Spouse name: Not on file   • Number of children: Not on file   • Years of education: Not on file   • Highest education level: Not on file   Tobacco Use   • Smoking status: Never Smoker   • Smokeless tobacco: Never Used   Vaping Use   • Vaping Use: Some days   • Substances: Nicotine   Substance and Sexual Activity   • Alcohol use: Yes   • Drug use: Not Currently   • Sexual activity: Not Currently           Objective   Physical Exam  Vitals and nursing note reviewed.   Constitutional:       General: She is not in acute distress.     Appearance: Normal appearance. She is well-developed. She is not diaphoretic.   HENT:      Head: Normocephalic and atraumatic.      Right Ear: Tympanic membrane, ear canal and external ear normal.      Left Ear: Tympanic membrane, ear canal and external ear normal.      Nose: Nose normal.      Mouth/Throat:      Mouth: Mucous membranes are moist.      Pharynx: Oropharynx is clear.   Eyes:      Extraocular Movements: Extraocular movements intact.      Conjunctiva/sclera: Conjunctivae normal.      Pupils: Pupils are equal, round, and reactive to light.   Neck:      Trachea: Trachea and phonation normal.      Meningeal: Brudzinski's sign and Kernig's sign absent.     Cardiovascular:      Rate and Rhythm: Normal rate and regular rhythm.      Pulses: Normal pulses.      Heart sounds: Normal heart sounds. No murmur heard.   No friction rub. No gallop.   "  Pulmonary:      Effort: Pulmonary effort is normal.      Breath sounds: Normal breath sounds.   Abdominal:      General: Bowel sounds are normal. There is no distension.      Palpations: Abdomen is soft.      Tenderness: There is no abdominal tenderness.   Musculoskeletal:         General: Normal range of motion.      Cervical back: Neck supple. No signs of trauma, rigidity or crepitus. Pain with movement and muscular tenderness present. No spinous process tenderness.   Skin:     General: Skin is warm and dry.      Capillary Refill: Capillary refill takes less than 2 seconds.   Neurological:      General: No focal deficit present.      Mental Status: She is alert and oriented to person, place, and time.      Deep Tendon Reflexes: Reflexes are normal and symmetric.   Psychiatric:         Mood and Affect: Mood normal.         Behavior: Behavior normal.         Procedures           ED Course  ED Course as of Jul 25 0256   Sun Jul 25, 2021   0015 Recommend pain x2 months.  Father has history of cerebral aneurysm.  Blood sugar over 400 at home.  Obtaining CT head in addition to C-spine x-rays, Accu-Chek and full set of labs.  Patient has been experiencing GI distress since starting on antibiotics yesterday for H. Pylori.  Giving IV fluids.    [SS]   0200 Lab work only notable for glucose of 179.  Otherwise unremarkable.  CT head shows mild sinus mucosal thickening.  Otherwise unremarkable.  X-ray shows mild spondylolisthesis.  Otherwise unremarkable.  When I informed patient of essentially normal results other than hyperglycemia patient states \"what the hell\".  She then states \"I am read there is nothing wrong but what is causing this?\"  I explained that no further work-up could be done in the emergency room.  I recommend patient follow-up with her PCP and have outpatient MRI.  Kernig and Burjocyski's negative.  No meningismus.  Patient reports the pain is worsened when she lays her head on the mattress without the " pillow in place.  None I feel patient's pain is likely musculoskeletal in nature.  Will treat with NSAIDs and muscle relaxants.  Will give neurosurgery for follow-up.  Will recheck blood sugar after insulin.  Anticipate discharging the patient home    [SS]   0250 Patient's blood sugar now 150.  Obviously much improved.  Will DC home.        Prescriptions1-Robaxin2-nabumetone    [SS]      ED Course User Index  [SS] Gonzalo Guo MD      Labs Reviewed   COMPREHENSIVE METABOLIC PANEL - Abnormal; Notable for the following components:       Result Value    Glucose 479 (*)     Sodium 132 (*)     Chloride 94 (*)     ALT (SGPT) 55 (*)     Alkaline Phosphatase 129 (*)     All other components within normal limits    Narrative:     GFR Normal >60  Chronic Kidney Disease <60  Kidney Failure <15     CBC WITH AUTO DIFFERENTIAL - Abnormal; Notable for the following components:    MCHC 36.1 (*)     RDW 11.9 (*)     RDW-SD 36.0 (*)     MPV 12.2 (*)     All other components within normal limits   POCT GLUCOSE FINGERSTICK - Abnormal; Notable for the following components:    Glucose 443 (*)     All other components within normal limits   POCT GLUCOSE FINGERSTICK - Abnormal; Notable for the following components:    Glucose 150 (*)     All other components within normal limits   POCT GLUCOSE FINGERSTICK   POCT GLUCOSE FINGERSTICK   CBC AND DIFFERENTIAL    Narrative:     The following orders were created for panel order CBC & Differential.  Procedure                               Abnormality         Status                     ---------                               -----------         ------                     CBC Auto Differential[[id number]]        Abnormal            Final result                 Please view results for these tests on the individual orders.     XR Spine Cervical Complete 4 or 5 View    Result Date: 7/25/2021  Narrative: CR Spine Cervical Min 4 Vws INDICATION: Neck pain for 2 months. No trauma. COMPARISON: None  available. FINDINGS: 6 views of the cervical spine. There is mild grade 1 anterolisthesis of C3 on C4 and C4 on C5. Alignment is otherwise normal. No acute fractures are seen. Disc spaces are maintained. There is anterior osteophyte formation at C5-6 and C6-7. Prevertebral soft tissues are normal. Oblique views demonstrate no osseous foraminal stenosis.     Impression: 1. No acute fracture. 2. Mild degenerative disease with spondylolisthesis as above. Signer Name: Isai Silva MD  Signed: 7/25/2021 12:58 AM  Workstation Name: [facility]    CT Head Without Contrast    Result Date: 7/25/2021  Narrative: CT Head WO HISTORY: Headache and neck pain for 2 months with no trauma. TECHNIQUE: Axial unenhanced head CT with multiplanar reformats. Radiation dose reduction techniques included automated exposure control or exposure modulation based on body size. Count of known CT and cardiac nuc med studies performed in previous 12 months: 0. COMPARISON: None. FINDINGS: Ventricular size and configuration are normal. No acute infarct or hemorrhage is identified. There are no masses. There is no skull fracture.  There is mild chronic mucosal thickening in the paranasal sinuses. There is no evidence of acute sinus disease.     Impression: Mild chronic changes in the paranasal sinuses. Otherwise negative head CT. Signer Name: Isai Silva MD  Signed: 7/25/2021 1:01 AM  Workstation Name: [facility]    My differential diagnosis for headache includes but is not limited to:  Migraine, cluster, ischemic stroke, subarachnoid hemorrhage, intracranial hemorrhage, vascular malformation, cerebral aneurysm, vascular dissection, vasculitis, temporal arteritis, malignant hypertension, pheochromocytoma, cerebral venous thrombosis, preeclampsia; bacterial meningitis, viral meningitis, fungal meningitis, encephalitis, brain abscess, pleural empyema, sinusitis, dental  infection, influenza, viral syndrome; carbon monoxide exposure, analgesic abuse, hypoglycemia; trigeminal neuralgia, postherpetic neuralgia, occipital neuralgia; subdural hematoma, concussion, musculoskeletal tension, cervical osteoarthritis; glaucoma, TMJ disease, pseudotumor cerebri, post LP headache, intracranial neoplasm, sleep apnea    My differential includes but is not limited to neck pain, cervical contusion, degenerative disc disease, herniated disc, acute cervical strain, cervical radiculopathy, cervical fracture, torticollis, carotid artery dissection and vertebral artery dissection                                       MDM    Final diagnoses:   Neck pain, chronic   Headache, chronic daily   Type 2 diabetes mellitus with hyperglycemia, without long-term current use of insulin (CMS/[facility])       ED Disposition  ED Disposition     ED Disposition Condition Comment    Discharge Stable           Tatyana Delgado MD  [address] [facility] [address]  [place] [id number] [phone]    Schedule an appointment as soon as possible for a visit in 1 week  for continued or worsened symptoms, Sooner if needed    Magdi Gates MD  [address] [facility] [address]  [place] [id number] [phone]    Schedule an appointment as soon as possible for a visit in 2 weeks  As needed for continued or worsened symptoms         Medication List      New Prescriptions    methocarbamol 750 MG tablet  Commonly known as: ROBAXIN  Take 2 tablets by mouth 3 (Three) Times a Day for 10 days. Take 1 tab if 2 tabs are too sedating     nabumetone 500 MG tablet  Commonly known as: RELAFEN  Take 1 tablet by mouth 2 (Two) Times a Day As Needed for Mild Pain  or Moderate Pain  for up to 7 days.           Where to Get Your Medications      These medications were sent to [id number] - [place] - [address] - [phone] [facility] [phone]   [address], [place] [address]    Phone: [phone]    · methocarbamol 750 MG tablet  · nabumetone 500 MG tablet          Gonzalo Guo MD  07/25/21 025

## 2021-08-04 ENCOUNTER — OFFICE VISIT (OUTPATIENT)
Dept: INTERNAL MEDICINE | Facility: CLINIC | Age: 46
End: 2021-08-04

## 2021-08-04 VITALS
TEMPERATURE: 97.6 F | SYSTOLIC BLOOD PRESSURE: 130 MMHG | WEIGHT: 140.6 LBS | DIASTOLIC BLOOD PRESSURE: 70 MMHG | RESPIRATION RATE: 18 BRPM | HEART RATE: 116 BPM | BODY MASS INDEX: 27.61 KG/M2 | OXYGEN SATURATION: 99 % | HEIGHT: 60 IN

## 2021-08-04 DIAGNOSIS — R51.9 CHRONIC DAILY HEADACHE: ICD-10-CM

## 2021-08-04 DIAGNOSIS — Z08 PAP SMEAR OF VAGINA WITH PREVIOUS HYSTERECTOMY FOR CANCER: ICD-10-CM

## 2021-08-04 DIAGNOSIS — M47.812 OSTEOARTHRITIS OF CERVICAL SPINE, UNSPECIFIED SPINAL OSTEOARTHRITIS COMPLICATION STATUS: ICD-10-CM

## 2021-08-04 DIAGNOSIS — E11.65 UNCONTROLLED TYPE 2 DIABETES MELLITUS WITH HYPERGLYCEMIA (HCC): ICD-10-CM

## 2021-08-04 DIAGNOSIS — Z90.710 PAP SMEAR OF VAGINA WITH PREVIOUS HYSTERECTOMY FOR CANCER: ICD-10-CM

## 2021-08-04 DIAGNOSIS — R51.9 OCCIPITAL PAIN: Primary | ICD-10-CM

## 2021-08-04 DIAGNOSIS — R53.82 CHRONIC FATIGUE: ICD-10-CM

## 2021-08-04 DIAGNOSIS — T24.202A PARTIAL THICKNESS BURN OF LEFT LOWER EXTREMITY, INITIAL ENCOUNTER: ICD-10-CM

## 2021-08-04 DIAGNOSIS — Z79.4 TYPE 2 DIABETES MELLITUS WITH HYPERGLYCEMIA, WITH LONG-TERM CURRENT USE OF INSULIN (HCC): ICD-10-CM

## 2021-08-04 DIAGNOSIS — E78.2 MIXED HYPERLIPIDEMIA: ICD-10-CM

## 2021-08-04 DIAGNOSIS — E11.65 TYPE 2 DIABETES MELLITUS WITH HYPERGLYCEMIA, WITH LONG-TERM CURRENT USE OF INSULIN (HCC): ICD-10-CM

## 2021-08-04 PROCEDURE — 99214 OFFICE O/P EST MOD 30 MIN: CPT | Performed by: INTERNAL MEDICINE

## 2021-08-04 RX ORDER — FLASH GLUCOSE SCANNING READER
1 EACH MISCELLANEOUS DAILY
Qty: 1 EACH | Refills: 2 | Status: SHIPPED | OUTPATIENT
Start: 2021-08-04

## 2021-08-04 RX ORDER — DULAGLUTIDE 1.5 MG/.5ML
1.5 INJECTION, SOLUTION SUBCUTANEOUS
Qty: 4 PEN | Refills: 5 | Status: SHIPPED | OUTPATIENT
Start: 2021-08-04 | End: 2021-09-23 | Stop reason: SDUPTHER

## 2021-08-04 RX ORDER — INSULIN GLARGINE 300 U/ML
45 INJECTION, SOLUTION SUBCUTANEOUS DAILY
Qty: 15 ML | Refills: 3 | Status: SHIPPED | OUTPATIENT
Start: 2021-08-04 | End: 2021-09-23 | Stop reason: SDUPTHER

## 2021-08-04 RX ORDER — FLASH GLUCOSE SENSOR
1 KIT MISCELLANEOUS TAKE AS DIRECTED
Qty: 6 EACH | Refills: 4 | Status: SHIPPED | OUTPATIENT
Start: 2021-08-04
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
## 2021-08-05 ENCOUNTER — HOSPITAL ENCOUNTER (OUTPATIENT)
Dept: MRI IMAGING | Facility: HOSPITAL | Age: 46
Discharge: HOME | End: 2021-08-05

## 2021-08-05 DIAGNOSIS — R51.9 OCCIPITAL PAIN: ICD-10-CM

## 2021-08-05 DIAGNOSIS — M47.812 OSTEOARTHRITIS OF CERVICAL SPINE, UNSPECIFIED SPINAL OSTEOARTHRITIS COMPLICATION STATUS: ICD-10-CM

## 2021-08-05 DIAGNOSIS — R51.9 CHRONIC DAILY HEADACHE: ICD-10-CM

## 2021-08-05 LAB
ALBUMIN/CREAT UR: 10 MG/G CREAT (ref 0–29)
CREAT UR-MCNC: 34.6 MG/DL
MICROALBUMIN UR-MCNC: 3.6 UG/ML

## 2021-08-05 PROCEDURE — 0 GADOBENATE DIMEGLUMINE 529 MG/ML SOLUTION: Performed by: INTERNAL MEDICINE

## 2021-08-05 PROCEDURE — 72141 MRI NECK SPINE W/O DYE: CPT

## 2021-08-05 PROCEDURE — 70553 MRI BRAIN STEM W/O & W/DYE: CPT

## 2021-08-05 PROCEDURE — A9577 INJ MULTIHANCE: HCPCS | Performed by: INTERNAL MEDICINE

## 2021-08-05 RX ADMIN — GADOBENATE DIMEGLUMINE 12 ML: 529 INJECTION, SOLUTION INTRAVENOUS at 12:04

## 2021-08-14 LAB
25(OH)D3+25(OH)D2 SERPL-MCNC: 19.9 NG/ML (ref 30–100)
ALBUMIN SERPL-MCNC: 4.1 G/DL (ref 3.5–5.2)
ALBUMIN/GLOB SERPL: 1.6 G/DL
ALP SERPL-CCNC: 111 U/L (ref 39–117)
ALT SERPL-CCNC: 39 U/L (ref 1–33)
AST SERPL-CCNC: 19 U/L (ref 1–32)
BASOPHILS # BLD AUTO: 0.05 10*3/MM3 (ref 0–0.2)
BASOPHILS NFR BLD AUTO: 0.6 % (ref 0–1.5)
BILIRUB SERPL-MCNC: 0.9 MG/DL (ref 0–1.2)
BUN SERPL-MCNC: 6 MG/DL (ref 6–20)
BUN/CREAT SERPL: 11.8 (ref 7–25)
CALCIUM SERPL-MCNC: 9.5 MG/DL (ref 8.6–10.5)
CHLORIDE SERPL-SCNC: 99 MMOL/L (ref 98–107)
CHOLEST SERPL-MCNC: 217 MG/DL (ref 0–200)
CO2 SERPL-SCNC: 27.8 MMOL/L (ref 22–29)
CREAT SERPL-MCNC: 0.51 MG/DL (ref 0.57–1)
EOSINOPHIL # BLD AUTO: 0.15 10*3/MM3 (ref 0–0.4)
EOSINOPHIL NFR BLD AUTO: 1.8 % (ref 0.3–6.2)
ERYTHROCYTE [DISTWIDTH] IN BLOOD BY AUTOMATED COUNT: 13.1 % (ref 12.3–15.4)
GLOBULIN SER CALC-MCNC: 2.5 GM/DL
GLUCOSE SERPL-MCNC: 354 MG/DL (ref 65–99)
HBA1C MFR BLD: 11.2 % (ref 4.8–5.6)
HCT VFR BLD AUTO: 41.4 % (ref 34–46.6)
HDLC SERPL-MCNC: 45 MG/DL (ref 40–60)
HGB BLD-MCNC: 14.3 G/DL (ref 12–15.9)
IMM GRANULOCYTES # BLD AUTO: 0.03 10*3/MM3 (ref 0–0.05)
IMM GRANULOCYTES NFR BLD AUTO: 0.4 % (ref 0–0.5)
LDLC SERPL CALC-MCNC: 139 MG/DL (ref 0–100)
LDLC/HDLC SERPL: 3 {RATIO}
LYMPHOCYTES # BLD AUTO: 1.54 10*3/MM3 (ref 0.7–3.1)
LYMPHOCYTES NFR BLD AUTO: 18.5 % (ref 19.6–45.3)
MCH RBC QN AUTO: 30.6 PG (ref 26.6–33)
MCHC RBC AUTO-ENTMCNC: 34.5 G/DL (ref 31.5–35.7)
MCV RBC AUTO: 88.5 FL (ref 79–97)
MONOCYTES # BLD AUTO: 0.48 10*3/MM3 (ref 0.1–0.9)
MONOCYTES NFR BLD AUTO: 5.8 % (ref 5–12)
NEUTROPHILS # BLD AUTO: 6.08 10*3/MM3 (ref 1.7–7)
NEUTROPHILS NFR BLD AUTO: 72.9 % (ref 42.7–76)
NRBC BLD AUTO-RTO: 0.1 /100 WBC (ref 0–0.2)
PLATELET # BLD AUTO: 250 10*3/MM3 (ref 140–450)
POTASSIUM SERPL-SCNC: 4.7 MMOL/L (ref 3.5–5.2)
PROT SERPL-MCNC: 6.6 G/DL (ref 6–8.5)
RBC # BLD AUTO: 4.68 10*6/MM3 (ref 3.77–5.28)
SODIUM SERPL-SCNC: 136 MMOL/L (ref 136–145)
T4 FREE SERPL-MCNC: 1.32 NG/DL (ref 0.93–1.7)
TRIGL SERPL-MCNC: 184 MG/DL (ref 0–150)
TSH SERPL DL<=0.005 MIU/L-ACNC: 0.94 UIU/ML (ref 0.27–4.2)
VIT B12 SERPL-MCNC: 664 PG/ML (ref 211–946)
VLDLC SERPL CALC-MCNC: 33 MG/DL (ref 5–40)
WBC # BLD AUTO: 8.33 10*3/MM3 (ref 3.4–10.8)

## 2021-09-16 RX ORDER — METFORMIN HYDROCHLORIDE 500 MG/1
TABLET, EXTENDED RELEASE ORAL
Qty: 30 TABLET | Refills: 0 | OUTPATIENT
Start: 2021-09-16

## 2021-09-23 ENCOUNTER — TELEPHONE (OUTPATIENT)
Dept: INTERNAL MEDICINE | Facility: CLINIC | Age: 46
End: 2021-09-23

## 2021-09-23 DIAGNOSIS — R11.0 NAUSEA: ICD-10-CM

## 2021-09-23 DIAGNOSIS — E11.65 UNCONTROLLED TYPE 2 DIABETES MELLITUS WITH HYPERGLYCEMIA (HCC): ICD-10-CM

## 2021-09-23 DIAGNOSIS — K21.00 GASTROESOPHAGEAL REFLUX DISEASE WITH ESOPHAGITIS WITHOUT HEMORRHAGE: ICD-10-CM

## 2021-09-23 DIAGNOSIS — Z79.4 TYPE 2 DIABETES MELLITUS WITH HYPERGLYCEMIA, WITH LONG-TERM CURRENT USE OF INSULIN (HCC): ICD-10-CM

## 2021-09-23 DIAGNOSIS — E11.65 TYPE 2 DIABETES MELLITUS WITH HYPERGLYCEMIA, WITH LONG-TERM CURRENT USE OF INSULIN (HCC): ICD-10-CM

## 2021-09-23 DIAGNOSIS — R10.13 DYSPEPSIA: ICD-10-CM

## 2021-09-23 DIAGNOSIS — A04.8 H. PYLORI INFECTION: ICD-10-CM

## 2021-09-23 RX ORDER — DULAGLUTIDE 1.5 MG/.5ML
1.5 INJECTION, SOLUTION SUBCUTANEOUS
Qty: 4 PEN | Refills: 5 | Status: SHIPPED | OUTPATIENT
Start: 2021-09-23 | End: 2022-05-04

## 2021-09-23 RX ORDER — INSULIN GLARGINE 300 U/ML
45 INJECTION, SOLUTION SUBCUTANEOUS DAILY
Qty: 15 ML | Refills: 3 | Status: SHIPPED | OUTPATIENT
Start: 2021-09-23

## 2021-09-23 RX ORDER — OMEPRAZOLE 40 MG/1
40 CAPSULE, DELAYED RELEASE ORAL 2 TIMES DAILY
Qty: 180 CAPSULE | Refills: 3 | Status: SHIPPED | OUTPATIENT
Start: 2021-09-23 | End: 2021-10-01 | Stop reason: SDUPTHER

## 2021-09-23 RX ORDER — METFORMIN HYDROCHLORIDE 500 MG/1
500 TABLET, EXTENDED RELEASE ORAL
Qty: 90 TABLET | Refills: 2 | Status: SHIPPED | OUTPATIENT
Start: 2021-09-23 | End: 2022-06-21

## 2021-09-23 RX ORDER — ONDANSETRON HYDROCHLORIDE 8 MG/1
8 TABLET, FILM COATED ORAL EVERY 8 HOURS PRN
Qty: 30 TABLET | Refills: 3 | Status: SHIPPED | OUTPATIENT
Start: 2021-09-23 | End: 2021-11-02

## 2021-10-01 DIAGNOSIS — E11.65 TYPE 2 DIABETES MELLITUS WITH HYPERGLYCEMIA, WITH LONG-TERM CURRENT USE OF INSULIN (HCC): ICD-10-CM

## 2021-10-01 DIAGNOSIS — R10.13 DYSPEPSIA: ICD-10-CM

## 2021-10-01 DIAGNOSIS — Z79.4 TYPE 2 DIABETES MELLITUS WITH HYPERGLYCEMIA, WITH LONG-TERM CURRENT USE OF INSULIN (HCC): ICD-10-CM

## 2021-10-01 DIAGNOSIS — K21.00 GASTROESOPHAGEAL REFLUX DISEASE WITH ESOPHAGITIS WITHOUT HEMORRHAGE: ICD-10-CM

## 2021-10-01 DIAGNOSIS — A04.8 H. PYLORI INFECTION: ICD-10-CM

## 2021-10-01 RX ORDER — OMEPRAZOLE 40 MG/1
40 CAPSULE, DELAYED RELEASE ORAL 2 TIMES DAILY
Qty: 180 CAPSULE | Refills: 3 | Status: SHIPPED | OUTPATIENT
Start: 2021-10-01 | End: 2022-09-05

## 2021-10-01 RX ORDER — LANCETS 30 GAUGE
1 EACH MISCELLANEOUS 2 TIMES DAILY
Qty: 200 EACH | Refills: 5 | Status: SHIPPED | OUTPATIENT
Start: 2021-10-01 | End: 2021-11-17 | Stop reason: SDUPTHER

## 2021-11-16 DIAGNOSIS — E11.65 TYPE 2 DIABETES MELLITUS WITH HYPERGLYCEMIA, WITH LONG-TERM CURRENT USE OF INSULIN (HCC): Primary | ICD-10-CM

## 2021-11-16 DIAGNOSIS — E78.2 MIXED HYPERLIPIDEMIA: ICD-10-CM

## 2021-11-16 DIAGNOSIS — J34.89 SINUS PRESSURE: Primary | ICD-10-CM

## 2021-11-16 DIAGNOSIS — Z79.4 TYPE 2 DIABETES MELLITUS WITH HYPERGLYCEMIA, WITH LONG-TERM CURRENT USE OF INSULIN (HCC): Primary | ICD-10-CM

## 2021-11-16 LAB
EXPIRATION DATE: NORMAL
FLUAV AG NPH QL: NEGATIVE
FLUBV AG NPH QL: NEGATIVE
INTERNAL CONTROL: NORMAL
Lab: NORMAL

## 2021-11-16 PROCEDURE — 87804 INFLUENZA ASSAY W/OPTIC: CPT | Performed by: INTERNAL MEDICINE

## 2021-11-17 DIAGNOSIS — Z79.4 DIABETES MELLITUS TYPE 2, INSULIN DEPENDENT (HCC): Primary | ICD-10-CM

## 2021-11-17 DIAGNOSIS — E11.9 DIABETES MELLITUS TYPE 2, INSULIN DEPENDENT (HCC): Primary | ICD-10-CM

## 2021-11-17 DIAGNOSIS — Z79.4 TYPE 2 DIABETES MELLITUS WITH HYPERGLYCEMIA, WITH LONG-TERM CURRENT USE OF INSULIN (HCC): Primary | ICD-10-CM

## 2021-11-17 DIAGNOSIS — Z79.4 TYPE 2 DIABETES MELLITUS WITH HYPERGLYCEMIA, WITH LONG-TERM CURRENT USE OF INSULIN (HCC): ICD-10-CM

## 2021-11-17 DIAGNOSIS — H57.89 EYE IRRITATION: ICD-10-CM

## 2021-11-17 DIAGNOSIS — E11.65 TYPE 2 DIABETES MELLITUS WITH HYPERGLYCEMIA, WITH LONG-TERM CURRENT USE OF INSULIN (HCC): ICD-10-CM

## 2021-11-17 DIAGNOSIS — E11.65 TYPE 2 DIABETES MELLITUS WITH HYPERGLYCEMIA, WITH LONG-TERM CURRENT USE OF INSULIN (HCC): Primary | ICD-10-CM

## 2021-11-17 LAB
LABCORP SARS-COV-2, NAA 2 DAY TAT: NORMAL
SARS-COV-2 RNA RESP QL NAA+PROBE: NOT DETECTED

## 2021-11-17 RX ORDER — LANCETS 30 GAUGE
1 EACH MISCELLANEOUS 4 TIMES DAILY
Qty: 400 EACH | Refills: 3 | Status: SHIPPED | OUTPATIENT
Start: 2021-11-17 | End: 2021-12-01 | Stop reason: SDUPTHER

## 2021-11-17 RX ORDER — BLOOD-GLUCOSE METER
1 KIT MISCELLANEOUS AS NEEDED
Qty: 1 EACH | Refills: 0 | Status: SHIPPED | OUTPATIENT
Start: 2021-11-17

## 2021-11-20 LAB
ALBUMIN/CREAT UR: <7 MG/G CREAT (ref 0–29)
CREAT UR-MCNC: 44 MG/DL
MICROALBUMIN UR-MCNC: <3 UG/ML

## 2021-11-23 ENCOUNTER — OFFICE VISIT (OUTPATIENT)
Dept: INTERNAL MEDICINE | Facility: CLINIC | Age: 46
End: 2021-11-23

## 2021-11-23 VITALS
HEIGHT: 60 IN | DIASTOLIC BLOOD PRESSURE: 80 MMHG | TEMPERATURE: 97.8 F | WEIGHT: 142.2 LBS | HEART RATE: 97 BPM | OXYGEN SATURATION: 98 % | BODY MASS INDEX: 27.92 KG/M2 | SYSTOLIC BLOOD PRESSURE: 130 MMHG | RESPIRATION RATE: 19 BRPM

## 2021-11-23 DIAGNOSIS — F41.9 ANXIETY: ICD-10-CM

## 2021-11-23 DIAGNOSIS — E11.65 TYPE 2 DIABETES MELLITUS WITH HYPERGLYCEMIA, WITH LONG-TERM CURRENT USE OF INSULIN (HCC): Primary | ICD-10-CM

## 2021-11-23 DIAGNOSIS — E78.2 MIXED HYPERLIPIDEMIA: ICD-10-CM

## 2021-11-23 DIAGNOSIS — K21.00 GASTROESOPHAGEAL REFLUX DISEASE WITH ESOPHAGITIS WITHOUT HEMORRHAGE: ICD-10-CM

## 2021-11-23 DIAGNOSIS — Z79.4 TYPE 2 DIABETES MELLITUS WITH HYPERGLYCEMIA, WITH LONG-TERM CURRENT USE OF INSULIN (HCC): Primary | ICD-10-CM

## 2021-11-23 PROCEDURE — 99214 OFFICE O/P EST MOD 30 MIN: CPT | Performed by: INTERNAL MEDICINE

## 2021-11-23 NOTE — PROGRESS NOTES
Yovanny Solitario is a 46 y.o. female, who presents with a chief complaint of   Chief Complaint   Patient presents with   • Diabetes   • Anxiety           HPI   Pt here for follow up    DM - a1c 11.2 -> 8.9.  Pt doing much better with glucoses.  She is taking her meds regularly and eating a much healthier diet.  She is off of the trulicity and her nausea is much better.  + thirst.      She has been having more anxiety lately.  She has had some panic attacks.  she doesnt really want to start meds.  She had more stress over the past year with death in the family and stress in life.    Upset stomach - GI started her on elavil and it made her feel like a zombie.      hld - pt has been on statin before. Cholesterol ok   The 10-year ASCVD risk score (Lisethsammie MILLER Jr., et al., 2013) is: 2.4%    Values used to calculate the score:      Age: 46 years      Sex: Female      Is Non- : No      Diabetic: Yes      Tobacco smoker: No      Systolic Blood Pressure: 130 mmHg      Is BP treated: No      HDL Cholesterol: 46 mg/dL      Total Cholesterol: 220 mg/dL    The following portions of the patient's history were reviewed and updated as appropriate: allergies, current medications, past family history, past medical history, past social history, past surgical history and problem list.    Allergies: Morphine, Eggs or egg-derived products, Latex, and Penicillins    Review of Systems   Constitutional: Negative.    HENT: Negative.    Eyes: Negative.    Respiratory: Negative.    Cardiovascular: Negative.    Gastrointestinal: Negative.    Endocrine: Negative.    Genitourinary: Negative.    Musculoskeletal: Negative.    Skin: Negative.    Allergic/Immunologic: Negative.    Neurological: Negative.    Hematological: Negative.    Psychiatric/Behavioral: The patient is nervous/anxious.    All other systems reviewed and are negative.            Wt Readings from Last 3 Encounters:   11/23/21 64.5 kg (142 lb 3.2 oz)    08/05/21 63.5 kg (140 lb)   08/04/21 63.8 kg (140 lb 9.6 oz)     Temp Readings from Last 3 Encounters:   11/23/21 97.8 °F (36.6 °C)   08/04/21 97.6 °F (36.4 °C) (Temporal)   07/25/21 97.8 °F (36.6 °C) (Oral)     BP Readings from Last 3 Encounters:   11/23/21 130/80   08/04/21 130/70   07/25/21 125/87     Pulse Readings from Last 3 Encounters:   11/23/21 97   08/04/21 116   07/25/21 101     Body mass index is 27.92 kg/m².  SpO2 Readings from Last 3 Encounters:   11/23/21 98%   08/04/21 99%   07/25/21 99%          Physical Exam  Vitals and nursing note reviewed.   Constitutional:       General: She is not in acute distress.     Appearance: She is well-developed.   HENT:      Head: Normocephalic and atraumatic.      Right Ear: External ear normal.      Left Ear: External ear normal.      Nose: Nose normal.   Eyes:      Conjunctiva/sclera: Conjunctivae normal.      Pupils: Pupils are equal, round, and reactive to light.   Cardiovascular:      Rate and Rhythm: Normal rate and regular rhythm.      Heart sounds: Normal heart sounds.   Pulmonary:      Effort: Pulmonary effort is normal. No respiratory distress.      Breath sounds: Normal breath sounds. No wheezing.   Musculoskeletal:         General: Normal range of motion.      Cervical back: Normal range of motion and neck supple.      Comments: Normal gait   Skin:     General: Skin is warm and dry.   Neurological:      Mental Status: She is alert and oriented to person, place, and time.   Psychiatric:         Behavior: Behavior normal.         Thought Content: Thought content normal.         Judgment: Judgment normal.         Results for orders placed or performed in visit on 11/21/21   Comprehensive Metabolic Panel    Specimen: Blood   Result Value Ref Range    Glucose 233 (H) 65 - 99 mg/dL    BUN 9 6 - 24 mg/dL    Creatinine 0.61 0.57 - 1.00 mg/dL    eGFR Non African Am 111 >59 mL/min/1.73    eGFR African Am 128 >59 mL/min/1.73    BUN/Creatinine Ratio 15 9 - 23     Sodium 138 134 - 144 mmol/L    Potassium 4.0 3.5 - 5.2 mmol/L    Chloride 98 96 - 106 mmol/L    Total CO2 24 20 - 29 mmol/L    Calcium 9.3 8.7 - 10.2 mg/dL    Total Protein 7.0 6.0 - 8.5 g/dL    Albumin 4.6 3.8 - 4.8 g/dL    Globulin 2.4 1.5 - 4.5 g/dL    A/G Ratio 1.9 1.2 - 2.2    Total Bilirubin 0.7 0.0 - 1.2 mg/dL    Alkaline Phosphatase 101 44 - 121 IU/L    AST (SGOT) 14 0 - 40 IU/L    ALT (SGPT) 26 0 - 32 IU/L   Lipid Panel With LDL / HDL Ratio    Specimen: Blood   Result Value Ref Range    Total Cholesterol 220 (H) 100 - 199 mg/dL    Triglycerides 179 (H) 0 - 149 mg/dL    HDL Cholesterol 46 >39 mg/dL    VLDL Cholesterol Ifeanyi 32 5 - 40 mg/dL    LDL Chol Calc (NIH) 142 (H) 0 - 99 mg/dL    LDL/HDL RATIO 3.1 0.0 - 3.2 ratio   Hemoglobin A1c    Specimen: Blood   Result Value Ref Range    Hemoglobin A1C 8.9 (H) 4.8 - 5.6 %   CBC & Differential    Specimen: Blood   Result Value Ref Range    WBC 10.0 3.4 - 10.8 x10E3/uL    RBC 4.81 3.77 - 5.28 x10E6/uL    Hemoglobin 14.7 11.1 - 15.9 g/dL    Hematocrit 43.4 34.0 - 46.6 %    MCV 90 79 - 97 fL    MCH 30.6 26.6 - 33.0 pg    MCHC 33.9 31.5 - 35.7 g/dL    RDW 13.0 11.7 - 15.4 %    Platelets 266 150 - 450 x10E3/uL    Neutrophil Rel % 76 Not Estab. %    Lymphocyte Rel % 16 Not Estab. %    Monocyte Rel % 6 Not Estab. %    Eosinophil Rel % 2 Not Estab. %    Basophil Rel % 0 Not Estab. %    Neutrophils Absolute 7.6 (H) 1.4 - 7.0 x10E3/uL    Lymphocytes Absolute 1.6 0.7 - 3.1 x10E3/uL    Monocytes Absolute 0.6 0.1 - 0.9 x10E3/uL    Eosinophils Absolute 0.2 0.0 - 0.4 x10E3/uL    Basophils Absolute 0.0 0.0 - 0.2 x10E3/uL    Immature Granulocyte Rel % 0 Not Estab. %    Immature Grans Absolute 0.0 0.0 - 0.1 x10E3/uL     Result Review :                  Assessment and Plan    Diagnoses and all orders for this visit:    1. Type 2 diabetes mellitus with hyperglycemia, with long-term current use of insulin (HCC) (Primary) - a1c improving.  Pt has met with the nutritionist.      2. Mixed  hyperlipidemia - hold on statin for now    3. Gastroesophageal reflux disease with esophagitis without hemorrhage  - cont ppi.  Off elavil    4. Anxiety - discussed coping skills. Discussed RBA of SSRI therapy.  Pt doesn't want to start meds right now.  Encouraged pt to consider talking with a therapist.     covid vaccine utd.  Will look into flu shot as pt has egg allergy.           Outpatient Medications Prior to Visit   Medication Sig Dispense Refill   • cetirizine (zyrTEC) 10 MG tablet      • cholecalciferol (VITAMIN D3) 25 MCG (1000 UT) tablet Take 2,000 Units by mouth Daily.     • Continuous Blood Gluc  (FreeStyle Cassi 14 Day [place]) device 1 each Daily. 1 each 2   • Continuous Blood Gluc Sensor (FreeStyle Cassi 14 Day Sensor) misc 1 each Take As Directed. 6 each 4   • Dulaglutide (Trulicity) 1.5 MG/0.5ML solution pen-injector Inject 1.5 mg under the skin into the appropriate area as directed Every 7 (Seven) Days. 4 pen 5   • empagliflozin (Jardiance) 25 MG tablet tablet Take 1 tablet by mouth Daily. 30 tablet 11   • fluticasone (FLONASE) 50 MCG/ACT nasal spray      • glucose blood test strip 1 each by Other route 4 (Four) Times a Day. To check glucoses 400 each 3   • glucose monitor monitoring kit 1 each As Needed (to check glucoses). 1 each 0   • Insulin Glargine, 1 Unit Dial, (Toujeo SoloStar) 300 UNIT/ML solution pen-injector injection Inject 45 Units under the skin into the appropriate area as directed Daily. 15 mL 3   • Insulin Pen Needle (Pen Needles) 32G X 5 MM misc 1 each Daily. 100 each 3   • Lancets misc 1 each 4 (Four) Times a Day. 400 each 3   • metFORMIN ER (GLUCOPHAGE-XR) 500 MG 24 hr tablet Take 1 tablet by mouth Daily With Breakfast for 270 days. 90 tablet 2   • Multiple Vitamins-Minerals (MULTIVITAMIN ADULTS PO) Take  by mouth.     • omeprazole (priLOSEC) 40 MG capsule Take 1 capsule by mouth 2 (two) times a day. 180 capsule 3   • silver sulfadiazine (Silvadene) 1 % cream Apply   topically to the appropriate area as directed 2 (Two) Times a Day. 400 g 0   • amitriptyline (ELAVIL) 25 MG tablet Take 1 tablet by mouth Every Night. 90 tablet 3     No facility-administered medications prior to visit.     No orders of the defined types were placed in this encounter.    [unfilled]  Medications Discontinued During This Encounter   Medication Reason   • amitriptyline (ELAVIL) 25 MG tablet *Therapy completed         No follow-ups on file.    Patient was given instructions and counseling regarding her condition or for health maintenance advice. Please see specific information pulled into the AVS if appropriate.

## 2021-12-01 DIAGNOSIS — E11.9 DIABETES MELLITUS TYPE 2, INSULIN DEPENDENT (HCC): ICD-10-CM

## 2021-12-01 DIAGNOSIS — Z79.4 TYPE 2 DIABETES MELLITUS WITH HYPERGLYCEMIA, WITH LONG-TERM CURRENT USE OF INSULIN (HCC): ICD-10-CM

## 2021-12-01 DIAGNOSIS — Z79.4 DIABETES MELLITUS TYPE 2, INSULIN DEPENDENT (HCC): ICD-10-CM

## 2021-12-01 DIAGNOSIS — E11.65 TYPE 2 DIABETES MELLITUS WITH HYPERGLYCEMIA, WITH LONG-TERM CURRENT USE OF INSULIN (HCC): ICD-10-CM

## 2021-12-01 RX ORDER — LANCETS 30 GAUGE
1 EACH MISCELLANEOUS 4 TIMES DAILY
Qty: 400 EACH | Refills: 3 | Status: SHIPPED | OUTPATIENT
Start: 2021-12-01

## 2022-01-04 DIAGNOSIS — R09.81 NASAL CONGESTION: Primary | ICD-10-CM

## 2022-01-04 DIAGNOSIS — Z20.822 CLOSE EXPOSURE TO COVID-19 VIRUS: Primary | ICD-10-CM

## 2022-01-06 LAB
LABCORP SARS-COV-2, NAA 2 DAY TAT: NORMAL
SARS-COV-2 RNA RESP QL NAA+PROBE: NOT DETECTED

## 2022-01-26 DIAGNOSIS — E11.65 UNCONTROLLED TYPE 2 DIABETES MELLITUS WITH HYPERGLYCEMIA: ICD-10-CM

## 2022-01-26 RX ORDER — BLOOD SUGAR DIAGNOSTIC
1 STRIP MISCELLANEOUS DAILY
Qty: 100 EACH | Refills: 3 | Status: SHIPPED | OUTPATIENT
Start: 2022-01-26

## 2022-04-25 ENCOUNTER — LAB (OUTPATIENT)
Dept: INTERNAL MEDICINE | Facility: CLINIC | Age: 47
End: 2022-04-25

## 2022-04-25 DIAGNOSIS — E11.65 UNCONTROLLED TYPE 2 DIABETES MELLITUS WITH HYPERGLYCEMIA: ICD-10-CM

## 2022-04-25 DIAGNOSIS — M79.676 PAIN OF TOE, UNSPECIFIED LATERALITY: ICD-10-CM

## 2022-04-25 DIAGNOSIS — E11.65 UNCONTROLLED TYPE 2 DIABETES MELLITUS WITH HYPERGLYCEMIA: Primary | ICD-10-CM

## 2022-04-25 DIAGNOSIS — E78.2 MIXED HYPERLIPIDEMIA: ICD-10-CM

## 2022-04-26 LAB
ALBUMIN SERPL-MCNC: 4.9 G/DL (ref 3.8–4.8)
ALBUMIN/CREAT UR: <10 MG/G CREAT (ref 0–29)
ALBUMIN/GLOB SERPL: 2 {RATIO} (ref 1.2–2.2)
ALP SERPL-CCNC: 128 IU/L (ref 44–121)
ALT SERPL-CCNC: 50 IU/L (ref 0–32)
AST SERPL-CCNC: 21 IU/L (ref 0–40)
BASOPHILS # BLD AUTO: 0 X10E3/UL (ref 0–0.2)
BASOPHILS NFR BLD AUTO: 1 %
BILIRUB SERPL-MCNC: 0.8 MG/DL (ref 0–1.2)
BUN SERPL-MCNC: 11 MG/DL (ref 6–24)
BUN/CREAT SERPL: 18 (ref 9–23)
CALCIUM SERPL-MCNC: 10 MG/DL (ref 8.7–10.2)
CHLORIDE SERPL-SCNC: 100 MMOL/L (ref 96–106)
CHOLEST SERPL-MCNC: 246 MG/DL (ref 100–199)
CO2 SERPL-SCNC: 24 MMOL/L (ref 20–29)
CREAT SERPL-MCNC: 0.61 MG/DL (ref 0.57–1)
CREAT UR-MCNC: 29.3 MG/DL
EGFRCR SERPLBLD CKD-EPI 2021: 113 ML/MIN/1.73
EOSINOPHIL # BLD AUTO: 0.2 X10E3/UL (ref 0–0.4)
EOSINOPHIL NFR BLD AUTO: 3 %
ERYTHROCYTE [DISTWIDTH] IN BLOOD BY AUTOMATED COUNT: 13.1 % (ref 11.7–15.4)
GLOBULIN SER CALC-MCNC: 2.5 G/DL (ref 1.5–4.5)
GLUCOSE SERPL-MCNC: 178 MG/DL (ref 65–99)
HBA1C MFR BLD: 8.7 % (ref 4.8–5.6)
HCT VFR BLD AUTO: 46.3 % (ref 34–46.6)
HDLC SERPL-MCNC: 59 MG/DL
HGB BLD-MCNC: 15.8 G/DL (ref 11.1–15.9)
IMM GRANULOCYTES # BLD AUTO: 0 X10E3/UL (ref 0–0.1)
IMM GRANULOCYTES NFR BLD AUTO: 0 %
LDLC SERPL CALC-MCNC: 152 MG/DL (ref 0–99)
LDLC/HDLC SERPL: 2.6 RATIO (ref 0–3.2)
LYMPHOCYTES # BLD AUTO: 1.4 X10E3/UL (ref 0.7–3.1)
LYMPHOCYTES NFR BLD AUTO: 20 %
MCH RBC QN AUTO: 30.2 PG (ref 26.6–33)
MCHC RBC AUTO-ENTMCNC: 34.1 G/DL (ref 31.5–35.7)
MCV RBC AUTO: 88 FL (ref 79–97)
MICROALBUMIN UR-MCNC: <3 UG/ML
MONOCYTES # BLD AUTO: 0.5 X10E3/UL (ref 0.1–0.9)
MONOCYTES NFR BLD AUTO: 8 %
NEUTROPHILS # BLD AUTO: 4.9 X10E3/UL (ref 1.4–7)
NEUTROPHILS NFR BLD AUTO: 68 %
PLATELET # BLD AUTO: 268 X10E3/UL (ref 150–450)
POTASSIUM SERPL-SCNC: 4.4 MMOL/L (ref 3.5–5.2)
PROT SERPL-MCNC: 7.4 G/DL (ref 6–8.5)
RBC # BLD AUTO: 5.24 X10E6/UL (ref 3.77–5.28)
SODIUM SERPL-SCNC: 142 MMOL/L (ref 134–144)
T4 FREE SERPL-MCNC: 1.4 NG/DL (ref 0.82–1.77)
TRIGL SERPL-MCNC: 195 MG/DL (ref 0–149)
TSH SERPL DL<=0.005 MIU/L-ACNC: 1.74 UIU/ML (ref 0.45–4.5)
URATE SERPL-MCNC: 2.8 MG/DL (ref 2.6–6.2)
VLDLC SERPL CALC-MCNC: 35 MG/DL (ref 5–40)
WBC # BLD AUTO: 7.1 X10E3/UL (ref 3.4–10.8)

## 2022-05-04 ENCOUNTER — OFFICE VISIT (OUTPATIENT)
Dept: INTERNAL MEDICINE | Facility: CLINIC | Age: 47
End: 2022-05-04

## 2022-05-04 VITALS
OXYGEN SATURATION: 99 % | HEART RATE: 95 BPM | TEMPERATURE: 96.8 F | DIASTOLIC BLOOD PRESSURE: 82 MMHG | SYSTOLIC BLOOD PRESSURE: 146 MMHG | WEIGHT: 149 LBS | BODY MASS INDEX: 29.25 KG/M2

## 2022-05-04 DIAGNOSIS — K21.00 GASTROESOPHAGEAL REFLUX DISEASE WITH ESOPHAGITIS WITHOUT HEMORRHAGE: ICD-10-CM

## 2022-05-04 DIAGNOSIS — E11.65 UNCONTROLLED TYPE 2 DIABETES MELLITUS WITH HYPERGLYCEMIA: Primary | ICD-10-CM

## 2022-05-04 DIAGNOSIS — E78.2 MIXED HYPERLIPIDEMIA: ICD-10-CM

## 2022-05-04 DIAGNOSIS — R03.0 ELEVATED BP WITHOUT DIAGNOSIS OF HYPERTENSION: ICD-10-CM

## 2022-05-04 PROCEDURE — 99214 OFFICE O/P EST MOD 30 MIN: CPT | Performed by: INTERNAL MEDICINE

## 2022-05-04 RX ORDER — ATORVASTATIN CALCIUM 20 MG/1
20 TABLET, FILM COATED ORAL DAILY
Qty: 90 TABLET | Refills: 1 | Status: SHIPPED | OUTPATIENT
Start: 2022-05-04

## 2022-05-04 NOTE — PROGRESS NOTES
Yovanny Solitario is a 46 y.o. female, who presents with a chief complaint of   Chief Complaint   Patient presents with   • Diabetes           HPI   Pt here today for follow up. She is moving soon.     DM - a1c 11.2 -> 8.9->8.7.  Pt doing much better with glucoses.  She is taking her meds regularly and eating a much healthier diet.  She is off of the trulicity and her nausea is much better.  + thirst.   she is on jardiance, metformin and tresiba.  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      Blood pressure up today.  Pt has been taking hydroxycut.     Upset stomach -  Nausea some better off trulicity.  She does taking zofran PRN.  She has had her gallbladder removed. Omeprazole has helped.       hld - pt has been on statin before. Her grandmother just had a pacemaker and heart issues in her 80's.  Her grandfather  of a MI at 85.  The 10-year ASCVD risk score (Phoenixsammie MILLER Jr., et al., 2013) is: 2.4%    Values used to calculate the score:      Age: 46 years      Sex: Female      Is Non- : No      Diabetic: Yes      Tobacco smoker: No      Systolic Blood Pressure: 146 mmHg      Is BP treated: No      HDL Cholesterol: 59 mg/dL      Total Cholesterol: 246 mg/dL       The following portions of the patient's history were reviewed and updated as appropriate: allergies, current medications, past family history, past medical history, past social history, past surgical history and problem list.    Allergies: Morphine, Eggs or egg-derived products, Latex, and Penicillins    Review of Systems   Constitutional: Negative.    HENT: Negative.    Eyes: Negative.    Respiratory: Negative.    Cardiovascular: Negative.    Gastrointestinal: Negative.    Endocrine: Negative.    Genitourinary: Negative.    Musculoskeletal: Negative.    Skin: Negative.    Allergic/Immunologic: Negative.    Neurological: Negative.    Hematological: Negative.    Psychiatric/Behavioral: Negative.    All other systems reviewed and  are negative.            Wt Readings from Last 3 Encounters:   05/04/22 67.6 kg (149 lb)   11/23/21 64.5 kg (142 lb 3.2 oz)   08/05/21 63.5 kg (140 lb)     Temp Readings from Last 3 Encounters:   05/04/22 96.8 °F (36 °C)   11/23/21 97.8 °F (36.6 °C)   08/04/21 97.6 °F (36.4 °C) (Temporal)     BP Readings from Last 3 Encounters:   05/04/22 146/82   11/23/21 130/80   08/04/21 130/70     Pulse Readings from Last 3 Encounters:   05/04/22 95   11/23/21 97   08/04/21 116     Body mass index is 29.25 kg/m².  SpO2 Readings from Last 3 Encounters:   05/04/22 99%   11/23/21 98%   08/04/21 99%          Physical Exam  Vitals and nursing note reviewed.   Constitutional:       General: She is not in acute distress.     Appearance: She is well-developed.   HENT:      Head: Normocephalic and atraumatic.      Right Ear: External ear normal.      Left Ear: External ear normal.      Nose: Nose normal.   Eyes:      Conjunctiva/sclera: Conjunctivae normal.      Pupils: Pupils are equal, round, and reactive to light.   Cardiovascular:      Rate and Rhythm: Normal rate and regular rhythm.      Heart sounds: Normal heart sounds.   Pulmonary:      Effort: Pulmonary effort is normal. No respiratory distress.      Breath sounds: Normal breath sounds. No wheezing.   Musculoskeletal:         General: Normal range of motion.      Cervical back: Normal range of motion and neck supple.      Comments: Normal gait   Skin:     General: Skin is warm and dry.   Neurological:      Mental Status: She is alert and oriented to person, place, and time.   Psychiatric:         Behavior: Behavior normal.         Thought Content: Thought content normal.         Judgment: Judgment normal.         Results for orders placed or performed in visit on 04/25/22   Uric Acid    Specimen: Blood   Result Value Ref Range    Uric Acid 2.8 2.6 - 6.2 mg/dL   Hemoglobin A1c    Specimen: Blood   Result Value Ref Range    Hemoglobin A1C 8.7 (H) 4.8 - 5.6 %   Lipid Panel With LDL  / HDL Ratio    Specimen: Blood   Result Value Ref Range    Total Cholesterol 246 (H) 100 - 199 mg/dL    Triglycerides 195 (H) 0 - 149 mg/dL    HDL Cholesterol 59 >39 mg/dL    VLDL Cholesterol Ifeanyi 35 5 - 40 mg/dL    LDL Chol Calc (NIH) 152 (H) 0 - 99 mg/dL    LDL/HDL RATIO 2.6 0.0 - 3.2 ratio   TSH    Specimen: Blood   Result Value Ref Range    TSH 1.740 0.450 - 4.500 uIU/mL   T4, Free    Specimen: Blood   Result Value Ref Range    Free T4 1.40 0.82 - 1.77 ng/dL   Comprehensive Metabolic Panel    Specimen: Blood   Result Value Ref Range    Glucose 178 (H) 65 - 99 mg/dL    BUN 11 6 - 24 mg/dL    Creatinine 0.61 0.57 - 1.00 mg/dL    EGFR Result 113 >59 mL/min/1.73    BUN/Creatinine Ratio 18 9 - 23    Sodium 142 134 - 144 mmol/L    Potassium 4.4 3.5 - 5.2 mmol/L    Chloride 100 96 - 106 mmol/L    Total CO2 24 20 - 29 mmol/L    Calcium 10.0 8.7 - 10.2 mg/dL    Total Protein 7.4 6.0 - 8.5 g/dL    Albumin 4.9 (H) 3.8 - 4.8 g/dL    Globulin 2.5 1.5 - 4.5 g/dL    A/G Ratio 2.0 1.2 - 2.2    Total Bilirubin 0.8 0.0 - 1.2 mg/dL    Alkaline Phosphatase 128 (H) 44 - 121 IU/L    AST (SGOT) 21 0 - 40 IU/L    ALT (SGPT) 50 (H) 0 - 32 IU/L   CBC & Differential    Specimen: Blood   Result Value Ref Range    WBC 7.1 3.4 - 10.8 x10E3/uL    RBC 5.24 3.77 - 5.28 x10E6/uL    Hemoglobin 15.8 11.1 - 15.9 g/dL    Hematocrit 46.3 34.0 - 46.6 %    MCV 88 79 - 97 fL    MCH 30.2 26.6 - 33.0 pg    MCHC 34.1 31.5 - 35.7 g/dL    RDW 13.1 11.7 - 15.4 %    Platelets 268 150 - 450 x10E3/uL    Neutrophil Rel % 68 Not Estab. %    Lymphocyte Rel % 20 Not Estab. %    Monocyte Rel % 8 Not Estab. %    Eosinophil Rel % 3 Not Estab. %    Basophil Rel % 1 Not Estab. %    Neutrophils Absolute 4.9 1.4 - 7.0 x10E3/uL    Lymphocytes Absolute 1.4 0.7 - 3.1 x10E3/uL    Monocytes Absolute 0.5 0.1 - 0.9 x10E3/uL    Eosinophils Absolute 0.2 0.0 - 0.4 x10E3/uL    Basophils Absolute 0.0 0.0 - 0.2 x10E3/uL    Immature Granulocyte Rel % 0 Not Estab. %    Immature Grans  Absolute 0.0 0.0 - 0.1 x10E3/uL     Result Review :                  Assessment and Plan    Diagnoses and all orders for this visit:    1. Uncontrolled type 2 diabetes mellitus with hyperglycemia (HCC) (Primary) - cont jardiance, metformin and increase insulin slowly to get sugars down.  Discussed lowering carb intake and counting carbs    2. Mixed hyperlipidemia  -     atorvastatin (LIPITOR) 20 MG tablet; Take 1 tablet by mouth Daily.  Dispense: 90 tablet; Refill: 1    3. Gastroesophageal reflux disease with esophagitis without hemorrhage - cont PPI.    4. Elevated BP without diagnosis of hypertension - Stop hydroxycut and cont to monitor bp.    Lab orders for next OV placed in [facility]          Outpatient Medications Prior to Visit   Medication Sig Dispense Refill   • cetirizine (zyrTEC) 10 MG tablet      • cholecalciferol (VITAMIN D3) 25 MCG (1000 UT) tablet Take 2,000 Units by mouth Daily.     • Continuous Blood Gluc  (FreeStyle Cassi 14 Day [place]) device 1 each Daily. 1 each 2   • Continuous Blood Gluc Sensor (FreeStyle Cassi 14 Day Sensor) misc 1 each Take As Directed. 6 each 4   • empagliflozin (Jardiance) 25 MG tablet tablet Take 1 tablet by mouth Daily. 30 tablet 11   • fluticasone (FLONASE) 50 MCG/ACT nasal spray      • glucose blood test strip 1 each by Other route 4 (Four) Times a Day. To check glucoses 400 each 3   • glucose monitor monitoring kit 1 each As Needed (to check glucoses). 1 each 0   • Insulin Glargine, 1 Unit Dial, (Toujeo SoloStar) 300 UNIT/ML solution pen-injector injection Inject 45 Units under the skin into the appropriate area as directed Daily. 15 mL 3   • Insulin Pen Needle (Pen Needles) 32G X 5 MM misc 1 each Daily. 100 each 3   • Lancets misc 1 each 4 (Four) Times a Day. 400 each 3   • metFORMIN ER (GLUCOPHAGE-XR) 500 MG 24 hr tablet Take 1 tablet by mouth Daily With Breakfast for 270 days. 90 tablet 2   • Multiple Vitamins-Minerals (MULTIVITAMIN ADULTS PO) Take  by mouth.      • omeprazole (priLOSEC) 40 MG capsule Take 1 capsule by mouth 2 (two) times a day. 180 capsule 3   • silver sulfadiazine (Silvadene) 1 % cream Apply  topically to the appropriate area as directed 2 (Two) Times a Day. 400 g 0   • Dulaglutide (Trulicity) 1.5 MG/0.5ML solution pen-injector Inject 1.5 mg under the skin into the appropriate area as directed Every 7 (Seven) Days. 4 pen 5     No facility-administered medications prior to visit.     New Medications Ordered This Visit   Medications   • atorvastatin (LIPITOR) 20 MG tablet     Sig: Take 1 tablet by mouth Daily.     Dispense:  90 tablet     Refill:  1     [unfilled]  Medications Discontinued During This Encounter   Medication Reason   • Dulaglutide (Trulicity) 1.5 MG/0.5ML solution pen-injector *Therapy completed         Return in about 3 months (around 8/4/2022) for Recheck, labs.    Patient was given instructions and counseling regarding her condition or for health maintenance advice. Please see specific information pulled into the AVS if appropriate.

## 2022-05-11 ENCOUNTER — TELEPHONE (OUTPATIENT)
Dept: INTERNAL MEDICINE | Facility: CLINIC | Age: 47
End: 2022-05-11

## 2022-05-11 RX ORDER — ONDANSETRON HYDROCHLORIDE 8 MG/1
8 TABLET, FILM COATED ORAL EVERY 8 HOURS PRN
Qty: 20 TABLET | Refills: 1 | Status: SHIPPED | OUTPATIENT
Start: 2022-05-11 | End: 2022-07-11

## 2022-05-11 NOTE — TELEPHONE ENCOUNTER
Patient is requesting a refill on Zofran 8mg tablets. Can we send this refill over to [facility] here in [place] please

## 2022-06-21 RX ORDER — METFORMIN HYDROCHLORIDE 500 MG/1
TABLET, EXTENDED RELEASE ORAL
Qty: 90 TABLET | Refills: 3 | Status: SHIPPED | OUTPATIENT
Start: 2022-06-21 | End: 2022-06-23

## 2022-06-23 RX ORDER — METFORMIN HYDROCHLORIDE 500 MG/1
TABLET, EXTENDED RELEASE ORAL
Qty: 90 TABLET | Refills: 3 | Status: SHIPPED | OUTPATIENT
Start: 2022-06-23

## 2022-07-11 RX ORDER — ONDANSETRON HYDROCHLORIDE 8 MG/1
TABLET, FILM COATED ORAL
Qty: 30 TABLET | Refills: 0 | Status: SHIPPED | OUTPATIENT
Start: 2022-07-11

## 2022-07-13 RX ORDER — ONDANSETRON HYDROCHLORIDE 8 MG/1
TABLET, FILM COATED ORAL
Qty: 30 TABLET | Refills: 0 | OUTPATIENT
Start: 2022-07-13

## 2022-09-03 DIAGNOSIS — Z79.4 TYPE 2 DIABETES MELLITUS WITH HYPERGLYCEMIA, WITH LONG-TERM CURRENT USE OF INSULIN: ICD-10-CM

## 2022-09-03 DIAGNOSIS — Z79.4 DIABETES MELLITUS TYPE 2, INSULIN DEPENDENT: ICD-10-CM

## 2022-09-03 DIAGNOSIS — R10.13 DYSPEPSIA: ICD-10-CM

## 2022-09-03 DIAGNOSIS — E11.9 DIABETES MELLITUS TYPE 2, INSULIN DEPENDENT: ICD-10-CM

## 2022-09-03 DIAGNOSIS — E11.65 TYPE 2 DIABETES MELLITUS WITH HYPERGLYCEMIA, WITH LONG-TERM CURRENT USE OF INSULIN: ICD-10-CM

## 2022-09-03 DIAGNOSIS — K21.00 GASTROESOPHAGEAL REFLUX DISEASE WITH ESOPHAGITIS WITHOUT HEMORRHAGE: ICD-10-CM

## 2022-09-03 DIAGNOSIS — E11.65 UNCONTROLLED TYPE 2 DIABETES MELLITUS WITH HYPERGLYCEMIA: ICD-10-CM

## 2022-09-03 DIAGNOSIS — A04.8 H. PYLORI INFECTION: ICD-10-CM

## 2022-09-05 RX ORDER — EMPAGLIFLOZIN 25 MG/1
TABLET, FILM COATED ORAL
Qty: 90 TABLET | Refills: 3 | Status: SHIPPED | OUTPATIENT
Start: 2022-09-05

## 2022-09-05 RX ORDER — PERPHENAZINE 16 MG/1
TABLET, FILM COATED ORAL
Qty: 400 EACH | Refills: 3 | Status: SHIPPED | OUTPATIENT
Start: 2022-09-05

## 2022-09-05 RX ORDER — OMEPRAZOLE 40 MG/1
CAPSULE, DELAYED RELEASE ORAL
Qty: 180 CAPSULE | Refills: 3 | Status: SHIPPED | OUTPATIENT
Start: 2022-09-05

## 2022-09-05 NOTE — TELEPHONE ENCOUNTER
Rx Refill Note  Requested Prescriptions     Pending Prescriptions Disp Refills    omeprazole (priLOSEC) 40 MG capsule [Pharmacy Med Name: Omeprazole 40 MG Oral Capsule Delayed Release] 180 capsule 3     Sig: TAKE 1 CAPSULE BY MOUTH  TWICE DAILY    Jardiance 25 MG tablet tablet [Pharmacy Med Name: Jardiance 25 MG Oral Tablet] 90 tablet 3     Sig: TAKE 1 TABLET BY MOUTH  DAILY    Contour Next Test test strip [Pharmacy Med Name: CONTOUR NEXT TEST STRIP]  3     Sig: CHECK BLOOD SUGAR 4 TIMES  DAILY      Last office visit with prescribing clinician: 5/4/2022      Next office visit with prescribing clinician: Visit date not found            LANRE THOMPSON MA  09/05/22, 09:28 EDT

## 2022-09-11 DIAGNOSIS — E78.2 MIXED HYPERLIPIDEMIA: ICD-10-CM

## 2022-09-12 RX ORDER — ATORVASTATIN CALCIUM 20 MG/1
20 TABLET, FILM COATED ORAL DAILY
Qty: 90 TABLET | Refills: 3 | OUTPATIENT
Start: 2022-09-12

## 2022-09-12 NOTE — TELEPHONE ENCOUNTER
Rx Refill Note  Requested Prescriptions     Pending Prescriptions Disp Refills   • atorvastatin (LIPITOR) 20 MG tablet [Pharmacy Med Name: Atorvastatin Calcium 20 MG Oral Tablet] 90 tablet 3     Sig: TAKE 1 TABLET BY MOUTH  DAILY      Last office visit with prescribing clinician: 5/4/2022      Next office visit with prescribing clinician: Visit date not found            Raian Gilbert MA  09/12/22, 12:22 EDT

## 2023-06-09 ENCOUNTER — TELEMEDICINE (OUTPATIENT)
Dept: INTERNAL MEDICINE | Facility: CLINIC | Age: 48
End: 2023-06-09
Payer: COMMERCIAL

## 2023-06-09 VITALS — WEIGHT: 146 LBS | HEIGHT: 60 IN | BODY MASS INDEX: 28.66 KG/M2

## 2023-06-09 DIAGNOSIS — A04.8 H. PYLORI INFECTION: ICD-10-CM

## 2023-06-09 DIAGNOSIS — E11.65 UNCONTROLLED TYPE 2 DIABETES MELLITUS WITH HYPERGLYCEMIA: Primary | ICD-10-CM

## 2023-06-09 DIAGNOSIS — E78.2 MIXED HYPERLIPIDEMIA: ICD-10-CM

## 2023-06-09 DIAGNOSIS — R11.0 NAUSEA: ICD-10-CM

## 2023-06-09 PROCEDURE — 99214 OFFICE O/P EST MOD 30 MIN: CPT | Performed by: INTERNAL MEDICINE

## 2023-06-09 RX ORDER — TIRZEPATIDE 5 MG/.5ML
INJECTION, SOLUTION SUBCUTANEOUS
COMMUNITY
Start: 2023-04-27

## 2023-06-09 RX ORDER — ONDANSETRON HYDROCHLORIDE 8 MG/1
8 TABLET, FILM COATED ORAL EVERY 12 HOURS PRN
Qty: 30 TABLET | Refills: 2 | Status: SHIPPED | OUTPATIENT
Start: 2023-06-09

## 2023-06-09 NOTE — PROGRESS NOTES
Yovanny Solitario is a 48 y.o. female, who presents with a chief complaint of   Chief Complaint   Patient presents with   • Diabetes     Follow up visit            HPI   This visit has been scheduled as a telehealth visit to comply with patient safety concerns in accordance with CDC recommendations. This was an audio and video enabled telemedicine encounter.    You have chosen to receive care through a televisit visit. Do you consent to use a televisit visit for your medical care today? Yes    Pt here for f/u on diabetes.  Her a1c had been down then glucoses went back up.  Most recent a1c was 8.8.  She has been working hard to get her glucoses back down. She had GI issus with trulicity in the past.  She is now on Monjauro and on getting ready to do dose 3.  She has had n/v with the monjauro.    Hx h. Pylori.  She feels like she may still have H. Pylori sx.  She has had her colonoscopy and has her mammogram scheduled.      htn - most readings around 119/70    HLD - lipids improved on labs pt recently had done.     She has episodes where her HR will jump up to 200.  Sh will walk away and focus on her breathing.  She has been awoken from sleep with a rapid HR.  She gets short of breath when this happens.  It has happened at least 2 times in the past month.        The following portions of the patient's history were reviewed and updated as appropriate: allergies, current medications, past family history, past medical history, past social history, past surgical history and problem list.    Allergies: Morphine, Eggs or egg-derived products, Latex, and Penicillins    Review of Systems   Constitutional: Negative.    HENT: Negative.    Eyes: Negative.    Respiratory: Negative.    Cardiovascular: Negative.    Gastrointestinal: Negative.    Endocrine: Negative.    Genitourinary: Negative.    Musculoskeletal: Negative.    Skin: Negative.    Allergic/Immunologic: Negative.    Neurological: Negative.    Hematological:  Negative.    Psychiatric/Behavioral: Negative.    All other systems reviewed and are negative.            Wt Readings from Last 3 Encounters:   06/09/23 66.2 kg (146 lb)   05/04/22 67.6 kg (149 lb)   11/23/21 64.5 kg (142 lb 3.2 oz)     Temp Readings from Last 3 Encounters:   05/04/22 96.8 °F (36 °C)   11/23/21 97.8 °F (36.6 °C)   08/04/21 97.6 °F (36.4 °C) (Temporal)     BP Readings from Last 3 Encounters:   05/04/22 146/82   11/23/21 130/80   08/04/21 130/70     Pulse Readings from Last 3 Encounters:   05/04/22 95   11/23/21 97   08/04/21 116     Body mass index is 28.67 kg/m².  SpO2 Readings from Last 3 Encounters:   05/04/22 99%   11/23/21 98%   08/04/21 99%          Physical Exam  Vitals and nursing note reviewed.   Constitutional:       General: She is not in acute distress.     Appearance: She is well-developed.   HENT:      Head: Normocephalic and atraumatic.      Right Ear: External ear normal.      Left Ear: External ear normal.      Nose: Nose normal.   Eyes:      Conjunctiva/sclera: Conjunctivae normal.      Pupils: Pupils are equal, round, and reactive to light.   Cardiovascular:      Rate and Rhythm: Normal rate and regular rhythm.      Heart sounds: Normal heart sounds.   Pulmonary:      Effort: Pulmonary effort is normal. No respiratory distress.      Breath sounds: Normal breath sounds. No wheezing.   Musculoskeletal:         General: Normal range of motion.      Cervical back: Normal range of motion and neck supple.      Comments: Normal gait   Skin:     General: Skin is warm and dry.   Neurological:      General: No focal deficit present.      Mental Status: She is alert and oriented to person, place, and time.   Psychiatric:         Mood and Affect: Mood normal.         Behavior: Behavior normal.         Thought Content: Thought content normal.         Judgment: Judgment normal.         Pts recentl labs  a1c 8.8  Cholesterol - 170  triglycercerides - 119  ldl - 94  hdl - 55  Vldl - 21  ast and  alt back to normal    Results for orders placed or performed in visit on 04/25/22   Uric Acid    Specimen: Blood   Result Value Ref Range    Uric Acid 2.8 2.6 - 6.2 mg/dL   Hemoglobin A1c    Specimen: Blood   Result Value Ref Range    Hemoglobin A1C 8.7 (H) 4.8 - 5.6 %   Lipid Panel With LDL / HDL Ratio    Specimen: Blood   Result Value Ref Range    Total Cholesterol 246 (H) 100 - 199 mg/dL    Triglycerides 195 (H) 0 - 149 mg/dL    HDL Cholesterol 59 >39 mg/dL    VLDL Cholesterol Ifeanyi 35 5 - 40 mg/dL    LDL Chol Calc (NIH) 152 (H) 0 - 99 mg/dL    LDL/HDL RATIO 2.6 0.0 - 3.2 ratio   TSH    Specimen: Blood   Result Value Ref Range    TSH 1.740 0.450 - 4.500 uIU/mL   T4, Free    Specimen: Blood   Result Value Ref Range    Free T4 1.40 0.82 - 1.77 ng/dL   Comprehensive Metabolic Panel    Specimen: Blood   Result Value Ref Range    Glucose 178 (H) 65 - 99 mg/dL    BUN 11 6 - 24 mg/dL    Creatinine 0.61 0.57 - 1.00 mg/dL    EGFR Result 113 >59 mL/min/1.73    BUN/Creatinine Ratio 18 9 - 23    Sodium 142 134 - 144 mmol/L    Potassium 4.4 3.5 - 5.2 mmol/L    Chloride 100 96 - 106 mmol/L    Total CO2 24 20 - 29 mmol/L    Calcium 10.0 8.7 - 10.2 mg/dL    Total Protein 7.4 6.0 - 8.5 g/dL    Albumin 4.9 (H) 3.8 - 4.8 g/dL    Globulin 2.5 1.5 - 4.5 g/dL    A/G Ratio 2.0 1.2 - 2.2    Total Bilirubin 0.8 0.0 - 1.2 mg/dL    Alkaline Phosphatase 128 (H) 44 - 121 IU/L    AST (SGOT) 21 0 - 40 IU/L    ALT (SGPT) 50 (H) 0 - 32 IU/L   CBC & Differential    Specimen: Blood   Result Value Ref Range    WBC 7.1 3.4 - 10.8 x10E3/uL    RBC 5.24 3.77 - 5.28 x10E6/uL    Hemoglobin 15.8 11.1 - 15.9 g/dL    Hematocrit 46.3 34.0 - 46.6 %    MCV 88 79 - 97 fL    MCH 30.2 26.6 - 33.0 pg    MCHC 34.1 31.5 - 35.7 g/dL    RDW 13.1 11.7 - 15.4 %    Platelets 268 150 - 450 x10E3/uL    Neutrophil Rel % 68 Not Estab. %    Lymphocyte Rel % 20 Not Estab. %    Monocyte Rel % 8 Not Estab. %    Eosinophil Rel % 3 Not Estab. %    Basophil Rel % 1 Not Estab. %     Neutrophils Absolute 4.9 1.4 - 7.0 x10E3/uL    Lymphocytes Absolute 1.4 0.7 - 3.1 x10E3/uL    Monocytes Absolute 0.5 0.1 - 0.9 x10E3/uL    Eosinophils Absolute 0.2 0.0 - 0.4 x10E3/uL    Basophils Absolute 0.0 0.0 - 0.2 x10E3/uL    Immature Granulocyte Rel % 0 Not Estab. %    Immature Grans Absolute 0.0 0.0 - 0.1 x10E3/uL     Result Review :                  Assessment and Plan    Diagnoses and all orders for this visit:    1. Uncontrolled type 2 diabetes mellitus with hyperglycemia (Primary) - cont current meds.  Cont slowly working up on Monjauro.  -     H. Pylori Breath Test - Breath, Lung  -     CBC & Differential  -     Comprehensive Metabolic Panel  -     Hemoglobin A1c  -     Lipid Panel With LDL / HDL Ratio  -     T4, Free  -     TSH    2. Mixed hyperlipidemia  -     Comprehensive Metabolic Panel  -     Lipid Panel With LDL / HDL Ratio    3. Nausea  -     ondansetron (ZOFRAN) 8 MG tablet; Take 1 tablet by mouth Every 12 (Twelve) Hours As Needed for Nausea or Vomiting.  Dispense: 30 tablet; Refill: 2    4. H. pylori infection  -     H. Pylori Breath Test - Breath, Lung         BMI is >= 25 and <30. (Overweight) The following options were offered after discussion;: exercise counseling/recommendations and nutrition counseling/recommendations             Outpatient Medications Prior to Visit   Medication Sig Dispense Refill   • atorvastatin (LIPITOR) 20 MG tablet Take 1 tablet by mouth Daily. 90 tablet 1   • cetirizine (zyrTEC) 10 MG tablet      • Contour Next Test test strip CHECK BLOOD SUGAR 4 TIMES  DAILY 400 each 3   • glucose monitor monitoring kit 1 each As Needed (to check glucoses). 1 each 0   • Insulin Glargine, 1 Unit Dial, (Toujeo SoloStar) 300 UNIT/ML solution pen-injector injection Inject 45 Units under the skin into the appropriate area as directed Daily. (Patient taking differently: Inject 45 Units under the skin into the appropriate area as directed Daily. Is between 45-60 depending on her glucose  levels) 15 mL 3   • Insulin Pen Needle (Pen Needles) 32G X 5 MM misc 1 each Daily. 100 each 3   • Jardiance 25 MG tablet tablet TAKE 1 TABLET BY MOUTH  DAILY 90 tablet 3   • Lancets misc 1 each 4 (Four) Times a Day. 400 each 3   • metFORMIN ER (GLUCOPHAGE-XR) 500 MG 24 hr tablet TAKE 1 TABLET BY MOUTH  DAILY WITH BREAKFAST (Patient taking differently: Take 1 tablet by mouth 2 (Two) Times a Day.) 90 tablet 3   • Mounjaro 5 MG/0.5ML solution pen-injector      • Multiple Vitamins-Minerals (MULTIVITAMIN ADULTS PO) Take  by mouth.     • omeprazole (priLOSEC) 40 MG capsule TAKE 1 CAPSULE BY MOUTH  TWICE DAILY 180 capsule 3   • ondansetron (ZOFRAN) 8 MG tablet TAKE 1 TABLET BY MOUTH  EVERY 8 HOURS AS NEEDED FOR NAUSEA OR VOMITING FOR UP  TO 40 DAYS 30 tablet 0   • cholecalciferol (VITAMIN D3) 25 MCG (1000 UT) tablet Take 2,000 Units by mouth Daily.     • Continuous Blood Gluc  (FreeStyle Cassi 14 Day Hanson) device 1 each Daily. 1 each 2   • Continuous Blood Gluc Sensor (FreeStyle Cassi 14 Day Sensor) misc 1 each Take As Directed. 6 each 4   • fluticasone (FLONASE) 50 MCG/ACT nasal spray      • silver sulfadiazine (Silvadene) 1 % cream Apply  topically to the appropriate area as directed 2 (Two) Times a Day. 400 g 0     No facility-administered medications prior to visit.     New Medications Ordered This Visit   Medications   • ondansetron (ZOFRAN) 8 MG tablet     Sig: Take 1 tablet by mouth Every 12 (Twelve) Hours As Needed for Nausea or Vomiting.     Dispense:  30 tablet     Refill:  2     [unfilled]  Medications Discontinued During This Encounter   Medication Reason   • cholecalciferol (VITAMIN D3) 25 MCG (1000 UT) tablet *Therapy completed   • silver sulfadiazine (Silvadene) 1 % cream *Therapy completed   • Continuous Blood Gluc  (FreeStyle Cassi 14 Day Hanson) device *Therapy completed   • Continuous Blood Gluc Sensor (FreeStyle Cassi 14 Day Sensor) misc *Therapy completed   • fluticasone  (FLONASE) 50 MCG/ACT nasal spray *Therapy completed   • ondansetron (ZOFRAN) 8 MG tablet Reorder         Return in about 7 weeks (around 7/28/2023) for labs, Recheck.    Patient was given instructions and counseling regarding her condition or for health maintenance advice. Please see specific information pulled into the AVS if appropriate.

## 2023-07-12 LAB
ALBUMIN SERPL-MCNC: 4.5 G/DL (ref 3.9–4.9)
ALBUMIN/GLOB SERPL: 2 {RATIO} (ref 1.2–2.2)
ALP SERPL-CCNC: 157 IU/L (ref 44–121)
ALT SERPL-CCNC: 48 IU/L (ref 0–32)
AST SERPL-CCNC: 23 IU/L (ref 0–40)
BASOPHILS # BLD AUTO: 0 X10E3/UL (ref 0–0.2)
BASOPHILS NFR BLD AUTO: 1 %
BILIRUB SERPL-MCNC: 0.8 MG/DL (ref 0–1.2)
BUN SERPL-MCNC: 5 MG/DL (ref 6–24)
BUN/CREAT SERPL: 9 (ref 9–23)
CALCIUM SERPL-MCNC: 9.4 MG/DL (ref 8.7–10.2)
CHLORIDE SERPL-SCNC: 101 MMOL/L (ref 96–106)
CHOLEST SERPL-MCNC: 145 MG/DL (ref 100–199)
CO2 SERPL-SCNC: 27 MMOL/L (ref 20–29)
CREAT SERPL-MCNC: 0.54 MG/DL (ref 0.57–1)
EGFRCR SERPLBLD CKD-EPI 2021: 115 ML/MIN/1.73
EOSINOPHIL # BLD AUTO: 0.3 X10E3/UL (ref 0–0.4)
EOSINOPHIL NFR BLD AUTO: 4 %
ERYTHROCYTE [DISTWIDTH] IN BLOOD BY AUTOMATED COUNT: 14.1 % (ref 11.7–15.4)
GLOBULIN SER CALC-MCNC: 2.3 G/DL (ref 1.5–4.5)
GLUCOSE SERPL-MCNC: 167 MG/DL (ref 70–99)
HBA1C MFR BLD: 6.5 % (ref 4.8–5.6)
HCT VFR BLD AUTO: 42.3 % (ref 34–46.6)
HDLC SERPL-MCNC: 40 MG/DL
HGB BLD-MCNC: 14.6 G/DL (ref 11.1–15.9)
IMM GRANULOCYTES # BLD AUTO: 0 X10E3/UL (ref 0–0.1)
IMM GRANULOCYTES NFR BLD AUTO: 0 %
LDLC SERPL CALC-MCNC: 70 MG/DL (ref 0–99)
LDLC/HDLC SERPL: 1.8 RATIO (ref 0–3.2)
LYMPHOCYTES # BLD AUTO: 1.8 X10E3/UL (ref 0.7–3.1)
LYMPHOCYTES NFR BLD AUTO: 23 %
MCH RBC QN AUTO: 30.4 PG (ref 26.6–33)
MCHC RBC AUTO-ENTMCNC: 34.5 G/DL (ref 31.5–35.7)
MCV RBC AUTO: 88 FL (ref 79–97)
MONOCYTES # BLD AUTO: 0.5 X10E3/UL (ref 0.1–0.9)
MONOCYTES NFR BLD AUTO: 6 %
NEUTROPHILS # BLD AUTO: 5.5 X10E3/UL (ref 1.4–7)
NEUTROPHILS NFR BLD AUTO: 66 %
PLATELET # BLD AUTO: 266 X10E3/UL (ref 150–450)
POTASSIUM SERPL-SCNC: 3.8 MMOL/L (ref 3.5–5.2)
PROT SERPL-MCNC: 6.8 G/DL (ref 6–8.5)
RBC # BLD AUTO: 4.8 X10E6/UL (ref 3.77–5.28)
SODIUM SERPL-SCNC: 140 MMOL/L (ref 134–144)
T4 FREE SERPL-MCNC: 1.41 NG/DL (ref 0.82–1.77)
TRIGL SERPL-MCNC: 209 MG/DL (ref 0–149)
TSH SERPL DL<=0.005 MIU/L-ACNC: 1.32 UIU/ML (ref 0.45–4.5)
VLDLC SERPL CALC-MCNC: 35 MG/DL (ref 5–40)
WBC # BLD AUTO: 8.1 X10E3/UL (ref 3.4–10.8)

## 2023-07-27 DIAGNOSIS — E11.65 TYPE 2 DIABETES MELLITUS WITH HYPERGLYCEMIA, WITH LONG-TERM CURRENT USE OF INSULIN: ICD-10-CM

## 2023-07-27 DIAGNOSIS — Z79.4 TYPE 2 DIABETES MELLITUS WITH HYPERGLYCEMIA, WITH LONG-TERM CURRENT USE OF INSULIN: ICD-10-CM

## 2023-07-27 DIAGNOSIS — Z79.4 DIABETES MELLITUS TYPE 2, INSULIN DEPENDENT: ICD-10-CM

## 2023-07-27 DIAGNOSIS — E11.9 DIABETES MELLITUS TYPE 2, INSULIN DEPENDENT: ICD-10-CM

## 2023-07-27 NOTE — TELEPHONE ENCOUNTER
Rx Refill Note  Requested Prescriptions     Pending Prescriptions Disp Refills    Contour Next Test test strip [Pharmacy Med Name: Contour Next Test In Vitro Strip]  3     Sig: CHECK BLOOD SUGAR 4 TIMES  DAILY      Last office visit with prescribing clinician: 5/4/2022   Last telemedicine visit with prescribing clinician: 6/9/2023   Next office visit with prescribing clinician: Visit date not found                         Would you like a call back once the refill request has been completed: [] Yes [] No    If the office needs to give you a call back, can they leave a voicemail: [] Yes [] No    Arlet Sloan MA  07/27/23, 07:43 EDT

## 2023-07-28 RX ORDER — PERPHENAZINE 16 MG/1
TABLET, FILM COATED ORAL
Qty: 400 EACH | Refills: 3 | Status: SHIPPED | OUTPATIENT
Start: 2023-07-28

## (undated) DEVICE — JACKT LAB F/R KNIT CUFF/COLR XLG BLU

## (undated) DEVICE — Device

## (undated) DEVICE — SYR LL 3CC

## (undated) DEVICE — BW-412T DISP COMBO CLEANING BRUSH: Brand: SINGLE USE COMBINATION CLEANING BRUSH

## (undated) DEVICE — KT ORCA ORCAPOD DISP STRL

## (undated) DEVICE — SUCTION CANISTER, 1000CC,SAFELINER: Brand: DEROYAL

## (undated) DEVICE — VIAL FORMALIN CAP 10P 40ML

## (undated) DEVICE — FRCP BX RADJAW4 NDL 2.8 240CM LG OG BX40

## (undated) DEVICE — THE BITE BLOCK MAXI, LATEX FREE STRAP IS USED TO PROTECT THE ENDOSCOPE INSERTION TUBE FROM BEING BITTEN BY THE PATIENT.

## (undated) DEVICE — GLV SURG SENSICARE PI MIC PF SZ7.5 LF STRL

## (undated) DEVICE — ADAPT CLN BIOGUARD AIR/H2O DISP